# Patient Record
Sex: FEMALE | Race: WHITE | NOT HISPANIC OR LATINO | URBAN - METROPOLITAN AREA
[De-identification: names, ages, dates, MRNs, and addresses within clinical notes are randomized per-mention and may not be internally consistent; named-entity substitution may affect disease eponyms.]

---

## 2019-05-22 PROBLEM — Z86.010 PERSONAL HISTORY OF COLONIC POLYPS: Status: ACTIVE | Noted: 2019-05-23

## 2019-05-22 PROBLEM — Z86.010 SURVEILLANCE DUE TO PRIOR COLONIC NEOPLASIA: Status: ACTIVE | Noted: 2019-05-23

## 2019-05-23 ENCOUNTER — AMBULATORY SURGICAL CENTER (OUTPATIENT)
Dept: URBAN - METROPOLITAN AREA SURGERY 17 | Facility: SURGERY | Age: 74
End: 2019-05-23
Payer: MEDICARE

## 2019-05-23 VITALS
SYSTOLIC BLOOD PRESSURE: 179 MMHG | OXYGEN SATURATION: 92 % | DIASTOLIC BLOOD PRESSURE: 78 MMHG | RESPIRATION RATE: 22 BRPM | DIASTOLIC BLOOD PRESSURE: 52 MMHG | SYSTOLIC BLOOD PRESSURE: 162 MMHG | SYSTOLIC BLOOD PRESSURE: 111 MMHG | SYSTOLIC BLOOD PRESSURE: 143 MMHG | TEMPERATURE: 97.8 F | DIASTOLIC BLOOD PRESSURE: 68 MMHG | SYSTOLIC BLOOD PRESSURE: 146 MMHG | OXYGEN SATURATION: 94 % | DIASTOLIC BLOOD PRESSURE: 93 MMHG | HEART RATE: 63 BPM | SYSTOLIC BLOOD PRESSURE: 174 MMHG | SYSTOLIC BLOOD PRESSURE: 148 MMHG | SYSTOLIC BLOOD PRESSURE: 164 MMHG | WEIGHT: 178 LBS | DIASTOLIC BLOOD PRESSURE: 104 MMHG | DIASTOLIC BLOOD PRESSURE: 85 MMHG | DIASTOLIC BLOOD PRESSURE: 97 MMHG | RESPIRATION RATE: 15 BRPM | HEART RATE: 64 BPM | RESPIRATION RATE: 19 BRPM | RESPIRATION RATE: 21 BRPM | HEART RATE: 60 BPM | RESPIRATION RATE: 23 BRPM | TEMPERATURE: 98.3 F | OXYGEN SATURATION: 90 % | SYSTOLIC BLOOD PRESSURE: 155 MMHG | DIASTOLIC BLOOD PRESSURE: 75 MMHG | RESPIRATION RATE: 16 BRPM | SYSTOLIC BLOOD PRESSURE: 140 MMHG | OXYGEN SATURATION: 98 % | DIASTOLIC BLOOD PRESSURE: 74 MMHG | OXYGEN SATURATION: 93 % | HEART RATE: 45 BPM | HEART RATE: 57 BPM | OXYGEN SATURATION: 95 % | HEART RATE: 50 BPM | DIASTOLIC BLOOD PRESSURE: 70 MMHG | HEART RATE: 69 BPM | OXYGEN SATURATION: 97 % | DIASTOLIC BLOOD PRESSURE: 69 MMHG | SYSTOLIC BLOOD PRESSURE: 184 MMHG | HEIGHT: 64 IN | HEART RATE: 59 BPM | OXYGEN SATURATION: 96 % | HEART RATE: 55 BPM | RESPIRATION RATE: 30 BRPM | DIASTOLIC BLOOD PRESSURE: 60 MMHG | HEART RATE: 51 BPM | SYSTOLIC BLOOD PRESSURE: 161 MMHG

## 2019-05-23 DIAGNOSIS — K57.30 DIVERTICULOSIS OF LARGE INTESTINE WITHOUT PERFORATION OR ABS: ICD-10-CM

## 2019-05-23 DIAGNOSIS — Z86.010 PERSONAL HISTORY OF COLONIC POLYPS: ICD-10-CM

## 2019-05-23 PROCEDURE — G0105 COLORECTAL SCRN; HI RISK IND: HCPCS | Performed by: INTERNAL MEDICINE

## 2019-05-23 NOTE — SERVICEHPINOTES
74-year-old white female with a history of polyps and diverticular disease. She has no GI complaints at this time. Family history is negative. She presents for a follow-up colonoscopy.

## 2020-08-06 ENCOUNTER — OFFICE VISIT (OUTPATIENT)
Dept: CARDIOLOGY | Facility: CLINIC | Age: 75
End: 2020-08-06

## 2020-08-06 VITALS
OXYGEN SATURATION: 98 % | WEIGHT: 180 LBS | DIASTOLIC BLOOD PRESSURE: 80 MMHG | HEIGHT: 64 IN | HEART RATE: 54 BPM | BODY MASS INDEX: 30.73 KG/M2 | SYSTOLIC BLOOD PRESSURE: 122 MMHG

## 2020-08-06 DIAGNOSIS — I48.0 PAROXYSMAL ATRIAL FIBRILLATION (HCC): Primary | ICD-10-CM

## 2020-08-06 PROCEDURE — 93000 ELECTROCARDIOGRAM COMPLETE: CPT | Performed by: INTERNAL MEDICINE

## 2020-08-06 PROCEDURE — 99204 OFFICE O/P NEW MOD 45 MIN: CPT | Performed by: INTERNAL MEDICINE

## 2020-08-06 RX ORDER — DILTIAZEM HYDROCHLORIDE EXTENDED-RELEASE TABLETS 240 MG/1
240 TABLET, EXTENDED RELEASE ORAL NIGHTLY
COMMUNITY
End: 2023-02-10 | Stop reason: SDUPTHER

## 2020-08-06 RX ORDER — ASCORBIC ACID 250 MG
1 TABLET,CHEWABLE ORAL DAILY
COMMUNITY
End: 2022-05-06

## 2020-08-06 RX ORDER — CARVEDILOL 6.25 MG/1
6.25 TABLET ORAL 2 TIMES DAILY WITH MEALS
COMMUNITY
End: 2022-04-22 | Stop reason: HOSPADM

## 2020-08-06 RX ORDER — MULTIVITAMIN
1 TABLET ORAL DAILY
COMMUNITY

## 2020-09-13 NOTE — PROGRESS NOTES
Date of Office Visit: 2020  Encounter Provider: Larry White MD  Place of Service: [facility] CARDIOLOGY  Patient Name: Ely Dumont  :1945    Chief complaint: Paroxysmal atrial fibrillation    History of Present Illness:    I had the pleasure of seeing the patient in cardiology office on 2020.  She is a very pleasant 75 year-old female with a history of paroxysmal atrial fibrillation.  I actually used to follow the patient years ago, but I have not seen her since .    The patient has a history of brief paroxysmal atrial tachycardia remotely.  However, she was diagnosed with paroxysmal atrial fibrillation with rapid response on 7/15/2020.  She states that she tripped and hit her face on 2020.  On 7/15/2020, she was sitting down when her heart rate started racing severely.  She felt short of breath and significant palpitations at the time.  She was admitted to [facility] in [place], [place], on 7/15/2020.  She was placed on a Cardizem drip and was rate controlled over the next several days.  She was anticoagulated with Eliquis at discharge.  She did have an echocardiogram performed on 2020 which showed an ejection fraction of 55% and no significant valvular disease.    The patient presents today to reestablish care with me.  Again, she recently was diagnosed with atrial fibrillation.  She is actually in sinus rhythm today.  She has not felt any chest pain or shortness of breath.  She really has not had recent palpitations.  She is taking all of her medications without difficulty.  She has not had any bleeding with the Eliquis.    Past Medical History:   Diagnosis Date   • Atrial fibrillation with RVR (CMS/HCC)     recurrent   • Colon polyp    • Contusion of face    • Essential hypertension    • Fall    • Hyperlipidemia    • New onset atrial fibrillation (CMS/HCC) 2020   • Osteoarthritis    • Osteopenia    • Paroxysmal  atrial tachycardia (CMS/HCC)    • Renal insufficiency     due to nsaids (8/2017 states labs wnl)   • Snoring        Past Surgical History:   Procedure Laterality Date   • CARDIAC CATHETERIZATION Left 2011    [facility], Dr. Cooper Ramires   • COLONOSCOPY  2014    negative   • COLONOSCOPY  2011    polyp-repeat 2014   • CYSTOSCOPY  09/2016    negative- Dr. Murphy per patient urethral polyp noted   • SKIN CANCER EXCISION  04/2016    basal cell on back   • TUBAL ABDOMINAL LIGATION         Current Outpatient Medications on File Prior to Visit   Medication Sig Dispense Refill   • apixaban (ELIQUIS) 5 MG tablet tablet Take 5 mg by mouth.     • Ascorbic Acid (VITAMIN C) 250 MG chewable tablet Chew.     • Calcium Carbonate-Vitamin D 500-50 MG-UNIT capsule Take  by mouth.     • carvedilol (COREG) 6.25 MG tablet Take 6.25 mg by mouth.     • Cholecalciferol (Vitamin D-1000 Max St) 25 MCG (1000 UT) tablet Take 1,000 Units by mouth Daily.     • cyanocobalamin ([employer] Vitamin B-12) 1000 MCG tablet Take  by mouth.     • dilTIAZem LA (CARDIZEM LA) 240 MG 24 hr tablet Take 240 mg by mouth Daily.     • Multiple Vitamin (MULTIVITAMIN) tablet Take 1 tablet by mouth Daily.     • PROBIOTIC PRODUCT PO Take  by mouth.       No current facility-administered medications on file prior to visit.      Allergies as of 08/06/2020 - Reviewed 08/06/2020   Allergen Reaction Noted   • Penicillins Rash 07/23/2014     Social History     Socioeconomic History   • Marital status:      Spouse name: Not on file   • Number of children: Not on file   • Years of education: Not on file   • Highest education level: Not on file   Tobacco Use   • Smoking status: Never Smoker   • Smokeless tobacco: Never Used   Substance and Sexual Activity   • Alcohol use: Never     Frequency: Never   • Drug use: Never     Family History   Problem Relation Age of Onset   • Heart disease Mother         afib   • Lung disease Mother         interstitial lung  "disease   • Stroke Father         hemmorhagic stroke-aneurysm   • Diabetes Brother        Review of Systems   Musculoskeletal: Positive for joint pain.   Genitourinary: Positive for frequency.   All other systems reviewed and are negative.     Objective:     Vitals:    08/06/20 0943   BP: 122/80   Pulse: 54   SpO2: 98%   Weight: 81.6 kg (180 lb)   Height: 162.6 cm (64\")     Body mass index is 30.9 kg/m².    Constitutional:       Appearance: Healthy appearance. Well-developed.   Eyes:      General: Lids are normal.      Conjunctiva/sclera: Conjunctivae normal.   HENT:      Head: Normocephalic and atraumatic.   Neck:      Musculoskeletal: Neck supple.   Pulmonary:      Effort: Pulmonary effort is normal.      Breath sounds: Normal breath sounds.   Cardiovascular:      Normal rate. Regular rhythm.      No gallop. No click. No rub.   Edema:     Peripheral edema absent.   Abdominal:      Palpations: Abdomen is soft.      Tenderness: There is no abdominal tenderness.   Skin:     General: Skin is warm.   Neurological:      Mental Status: Alert and oriented to person, place, and time.   Psychiatric:         Behavior: Behavior normal.       Lab Review:     ECG 12 Lead    Date/Time: 8/6/2020 10:10 AM  Performed by: Larry White MD  Authorized by: Larry White MD   Comparison: compared with previous ECG from 7/17/2020  Comparison to previous ECG: Compared to the prior EKG, sinus rhythm has replaced atrial fibrillation  Rhythm: sinus bradycardia  Rate: bradycardic  BPM: 56  Other findings: T wave abnormality and left atrial abnormality    Clinical impression: abnormal EKG          Cardiac Procedures:  1.  Left heart catheterization on 5/17/2011: Normal coronary arteries.  Tortuous vessels.  2.  Echocardiogram on 7/17/2020: Ejection fraction 55%.  Right ventricular function normal.  No significant valvular disease.      Assessment:       Diagnosis Plan   1. Paroxysmal atrial fibrillation (CMS/HCC)       Plan:     "   Again, the patient has converted back to sinus rhythm at this point.  She is currently in sinus bradycardia with a rate of 56 on her EKG.  She is currently on carvedilol 6.25 mg twice a day, Cardizem  mg/day, and Eliquis 5 mg twice a day.  I am going to leave her on her current regimen.  Her CHADS-VASc score is 4, and I do agree with anticoagulation.  Her echocardiogram did not show any significant pathology.  For now, I am going to have her return to the office in 3 months unless other issues arise.

## 2020-10-20 ENCOUNTER — OFFICE VISIT (OUTPATIENT)
Dept: CARDIOLOGY | Facility: CLINIC | Age: 75
End: 2020-10-20

## 2020-10-20 VITALS
BODY MASS INDEX: 30.73 KG/M2 | OXYGEN SATURATION: 99 % | HEIGHT: 64 IN | WEIGHT: 180 LBS | SYSTOLIC BLOOD PRESSURE: 132 MMHG | HEART RATE: 61 BPM | DIASTOLIC BLOOD PRESSURE: 84 MMHG

## 2020-10-20 DIAGNOSIS — I48.0 PAROXYSMAL ATRIAL FIBRILLATION (HCC): Primary | ICD-10-CM

## 2020-10-20 PROCEDURE — 99213 OFFICE O/P EST LOW 20 MIN: CPT | Performed by: INTERNAL MEDICINE

## 2020-10-21 NOTE — PROGRESS NOTES
Date of Office Visit:  10/20/2020  Encounter Provider: Larry White MD  Place of Service: [facility] CARDIOLOGY  Patient Name: Ely Dumont  :1945    Chief complaint: Paroxysmal atrial fibrillation    History of Present Illness:    I again had the pleasure of seeing the patient in cardiology office on 10/20/2020.  She is a very pleasant 75 year-old female with a history of paroxysmal atrial fibrillation who presents for follow-up.    The patient has a history of brief paroxysmal atrial tachycardia remotely.  However, she was diagnosed with paroxysmal atrial fibrillation with rapid response on 7/15/2020.  She states that she tripped and hit her face on 2020.  On 7/15/2020, she was sitting down when her heart rate started racing severely.  She felt short of breath and significant palpitations at the time.  She was admitted to [facility] in [place], [place], on 7/15/2020.  She was placed on a Cardizem drip and was rate controlled over the next several days.  She was anticoagulated with Eliquis at discharge.  She did have an echocardiogram performed on 2020 which showed an ejection fraction of 55% and no significant valvular disease.    The patient presents today for follow-up.  Overall, she has been doing well.  She had 2 episodes of atrial fibrillation in 2020, but has not felt any recently.  She only occasionally has palpitations.  She has been taking the Eliquis without any difficulty.  She does get fatigued at times, and attributes this to some of her medications.    Past Medical History:   Diagnosis Date   • Atrial fibrillation with RVR (CMS/HCC)     recurrent   • Colon polyp    • Contusion of face    • Essential hypertension    • Fall    • Hyperlipidemia    • New onset atrial fibrillation (CMS/HCC) 2020   • Osteoarthritis    • Osteopenia    • Paroxysmal atrial tachycardia (CMS/HCC)    • Renal insufficiency     due to  nsaids (8/2017 states labs wnl)   • Snoring        Past Surgical History:   Procedure Laterality Date   • CARDIAC CATHETERIZATION Left 2011    [facility], Dr. Cooper Ramires   • COLONOSCOPY  2014    negative   • COLONOSCOPY  2011    polyp-repeat 2014   • CYSTOSCOPY  09/2016    negative- Dr. Murphy per patient urethral polyp noted   • SKIN CANCER EXCISION  04/2016    basal cell on back   • TUBAL ABDOMINAL LIGATION         Current Outpatient Medications on File Prior to Visit   Medication Sig Dispense Refill   • apixaban (ELIQUIS) 5 MG tablet tablet Take 5 mg by mouth.     • Ascorbic Acid (VITAMIN C) 250 MG chewable tablet Chew.     • Calcium Carbonate-Vitamin D 500-50 MG-UNIT capsule Take  by mouth.     • carvedilol (COREG) 6.25 MG tablet Take 6.25 mg by mouth.     • Cholecalciferol (Vitamin D-1000 Max St) 25 MCG (1000 UT) tablet Take 1,000 Units by mouth Daily.     • cyanocobalamin ([employer] Vitamin B-12) 1000 MCG tablet Take  by mouth.     • dilTIAZem LA (CARDIZEM LA) 240 MG 24 hr tablet Take 240 mg by mouth Daily.     • Multiple Vitamin (MULTIVITAMIN) tablet Take 1 tablet by mouth Daily.     • PROBIOTIC PRODUCT PO Take  by mouth.       No current facility-administered medications on file prior to visit.      Allergies as of 10/20/2020 - Reviewed 10/20/2020   Allergen Reaction Noted   • Penicillins Rash 07/23/2014     Social History     Socioeconomic History   • Marital status:      Spouse name: Not on file   • Number of children: Not on file   • Years of education: Not on file   • Highest education level: Not on file   Tobacco Use   • Smoking status: Never Smoker   • Smokeless tobacco: Never Used   Substance and Sexual Activity   • Alcohol use: Never     Frequency: Never   • Drug use: Never     Family History   Problem Relation Age of Onset   • Heart disease Mother         afib   • Lung disease Mother         interstitial lung disease   • Stroke Father         hemmorhagic stroke-aneurysm   •  "Diabetes Brother        Review of Systems   Constitution: Positive for malaise/fatigue.   Musculoskeletal: Positive for joint pain.   Genitourinary: Positive for frequency.   All other systems reviewed and are negative.     Objective:     Vitals:    10/20/20 1345   BP: 132/84   Pulse: 61   SpO2: 99%   Weight: 81.6 kg (180 lb)   Height: 162.6 cm (64.02\")     Body mass index is 30.88 kg/m².    Constitutional:       Appearance: Healthy appearance. Well-developed.   Eyes:      General: Lids are normal.      Conjunctiva/sclera: Conjunctivae normal.   HENT:      Head: Normocephalic and atraumatic.   Neck:      Musculoskeletal: Neck supple.   Pulmonary:      Effort: Pulmonary effort is normal.      Breath sounds: Normal breath sounds.   Cardiovascular:      Normal rate. Regular rhythm.      Murmurs: There is no murmur.      No gallop. No click. No rub.   Edema:     Peripheral edema absent.   Abdominal:      Palpations: Abdomen is soft.      Tenderness: There is no abdominal tenderness.   Skin:     General: Skin is warm.   Neurological:      Mental Status: Alert and oriented to person, place, and time.   Psychiatric:         Behavior: Behavior normal.       Lab Review:   Procedures  Cardiac Procedures:  1.  Left heart catheterization on 5/17/2011: Normal coronary arteries.  Tortuous vessels.  2.  Echocardiogram on 7/17/2020: Ejection fraction 55%.  Right ventricular function normal.  No significant valvular disease.      Assessment:       Diagnosis Plan   1. Paroxysmal atrial fibrillation (CMS/HCC)       Plan:       The patient seems to be doing very well.  The fatigue is very likely from the carvedilol and the Cardizem CD.  However, she is tolerating both of these very well.  She is going to move the Cardizem CD from around noon to the evening to see if this helps.  Overall, she has not had any bleeding with the Eliquis thus far, and will continue on this.  I will see her back in the office in 6 months.      "

## 2021-03-02 ENCOUNTER — HOSPITAL ENCOUNTER (OUTPATIENT)
Dept: VACCINE CLINIC | Facility: HOSPITAL | Age: 76
Discharge: HOME | End: 2021-03-02
Attending: INTERNAL MEDICINE

## 2021-03-24 ENCOUNTER — HOSPITAL ENCOUNTER (OUTPATIENT)
Dept: VACCINE CLINIC | Facility: HOSPITAL | Age: 76
Discharge: HOME | End: 2021-03-24
Attending: INTERNAL MEDICINE

## 2021-05-04 ENCOUNTER — OFFICE VISIT (OUTPATIENT)
Dept: CARDIOLOGY | Facility: CLINIC | Age: 76
End: 2021-05-04

## 2021-05-04 VITALS
DIASTOLIC BLOOD PRESSURE: 100 MMHG | SYSTOLIC BLOOD PRESSURE: 160 MMHG | BODY MASS INDEX: 31.38 KG/M2 | HEART RATE: 80 BPM | HEIGHT: 64 IN | WEIGHT: 183.8 LBS

## 2021-05-04 DIAGNOSIS — I48.0 PAF (PAROXYSMAL ATRIAL FIBRILLATION) (HCC): Primary | ICD-10-CM

## 2021-05-04 DIAGNOSIS — I10 ESSENTIAL HYPERTENSION: ICD-10-CM

## 2021-05-04 PROCEDURE — 99213 OFFICE O/P EST LOW 20 MIN: CPT | Performed by: INTERNAL MEDICINE

## 2021-05-04 RX ORDER — LOSARTAN POTASSIUM 25 MG/1
25 TABLET ORAL DAILY
Qty: 30 TABLET | Refills: 11 | Status: SHIPPED | OUTPATIENT
Start: 2021-05-04 | End: 2022-05-19

## 2021-05-12 ENCOUNTER — LAB (OUTPATIENT)
Dept: LAB | Facility: HOSPITAL | Age: 76
End: 2021-05-12

## 2021-05-12 DIAGNOSIS — I10 ESSENTIAL HYPERTENSION: ICD-10-CM

## 2021-05-12 DIAGNOSIS — I48.0 PAF (PAROXYSMAL ATRIAL FIBRILLATION) (HCC): ICD-10-CM

## 2021-05-12 LAB
ANION GAP SERPL CALCULATED.3IONS-SCNC: 8.6 MMOL/L (ref 5–15)
BUN SERPL-MCNC: 15 MG/DL (ref 8–23)
BUN/CREAT SERPL: 21.7 (ref 7–25)
CALCIUM SPEC-SCNC: 9.1 MG/DL (ref 8.6–10.5)
CHLORIDE SERPL-SCNC: 104 MMOL/L (ref 98–107)
CO2 SERPL-SCNC: 26.4 MMOL/L (ref 22–29)
CREAT SERPL-MCNC: 0.69 MG/DL (ref 0.57–1)
GFR SERPL CREATININE-BSD FRML MDRD: 82 ML/MIN/1.73
GLUCOSE SERPL-MCNC: 106 MG/DL (ref 65–99)
POTASSIUM SERPL-SCNC: 4.2 MMOL/L (ref 3.5–5.2)
SODIUM SERPL-SCNC: 139 MMOL/L (ref 136–145)

## 2021-05-12 PROCEDURE — 36415 COLL VENOUS BLD VENIPUNCTURE: CPT

## 2021-05-12 PROCEDURE — 80048 BASIC METABOLIC PNL TOTAL CA: CPT

## 2021-05-16 NOTE — PROGRESS NOTES
"Date of Office Visit:  2021  Encounter Provider: Larry White MD  Place of Service: [facility] CARDIOLOGY  Patient Name: Ely Dumont  :1945    Chief complaint: Paroxysmal atrial fibrillation    History of Present Illness:    I again had the pleasure of seeing the patient in cardiology office on 2021.  She is a very pleasant 76 year-old female with a history of paroxysmal atrial fibrillation who presents for follow-up.    The patient has a history of brief paroxysmal atrial tachycardia remotely.  However, she was diagnosed with paroxysmal atrial fibrillation with rapid response on 7/15/2020.  She states that she tripped and hit her face on 2020.  On 7/15/2020, she was sitting down when her heart rate started racing severely.  She felt short of breath and significant palpitations at the time.  She was admitted to [facility] in [place], [place], on 7/15/2020.  She was placed on a Cardizem drip and was rate controlled over the next several days.  She was anticoagulated with Eliquis at discharge.  She did have an echocardiogram performed on 2020 which showed an ejection fraction of 55% and no significant valvular disease.    The patient presents today for follow-up.  She has not felt any recent atrial fibrillation.  Her blood pressure is elevated today at 160/100.  She tells me that her systolic blood pressure normally runs in the 140s at home, although she recently had a reading of 154/92.  She occasionally feels like she \"drops beats\", but has not had rapid palpitations consistent with the atrial fibrillation.  She has had no chest pain.    Past Medical History:   Diagnosis Date   • Atrial fibrillation with RVR (CMS/HCC)     recurrent   • Colon polyp    • Contusion of face    • Essential hypertension    • Fall    • Hyperlipidemia    • New onset atrial fibrillation (CMS/HCC) 2020   • Osteoarthritis    • Osteopenia    • " Paroxysmal atrial tachycardia (CMS/HCC)    • Renal insufficiency     due to nsaids (8/2017 states labs wnl)   • Snoring        Past Surgical History:   Procedure Laterality Date   • CARDIAC CATHETERIZATION Left 2011    [facility], Dr. Cooper Ramires   • COLONOSCOPY  2014    negative   • COLONOSCOPY  2011    polyp-repeat 2014   • CYSTOSCOPY  09/2016    negative- Dr. Murphy per patient urethral polyp noted   • SKIN CANCER EXCISION  04/2016    basal cell on back   • TUBAL ABDOMINAL LIGATION         Current Outpatient Medications on File Prior to Visit   Medication Sig Dispense Refill   • apixaban (ELIQUIS) 5 MG tablet tablet Take 5 mg by mouth.     • Ascorbic Acid (VITAMIN C) 250 MG chewable tablet Chew.     • Calcium Carbonate-Vitamin D 500-50 MG-UNIT capsule Take  by mouth.     • carvedilol (COREG) 6.25 MG tablet Take 6.25 mg by mouth.     • Cholecalciferol (Vitamin D-1000 Max St) 25 MCG (1000 UT) tablet Take 1,000 Units by mouth Daily.     • cyanocobalamin ([employer] Vitamin B-12) 1000 MCG tablet Take  by mouth.     • dilTIAZem LA (CARDIZEM LA) 240 MG 24 hr tablet Take 240 mg by mouth Daily.     • Multiple Vitamin (MULTIVITAMIN) tablet Take 1 tablet by mouth Daily.     • PROBIOTIC PRODUCT PO Take  by mouth.       No current facility-administered medications on file prior to visit.     Allergies as of 05/04/2021 - Reviewed 05/04/2021   Allergen Reaction Noted   • Penicillins Rash 07/23/2014     Social History     Socioeconomic History   • Marital status:      Spouse name: Not on file   • Number of children: Not on file   • Years of education: Not on file   • Highest education level: Not on file   Tobacco Use   • Smoking status: Never Smoker   • Smokeless tobacco: Never Used   Substance and Sexual Activity   • Alcohol use: Never   • Drug use: Never     Family History   Problem Relation Age of Onset   • Heart disease Mother         afib   • Lung disease Mother         interstitial lung disease   •  "Stroke Father         hemmorhagic stroke-aneurysm   • Diabetes Brother        Review of Systems   Constitutional: Positive for malaise/fatigue.   Musculoskeletal: Positive for joint pain.   All other systems reviewed and are negative.     Objective:     Vitals:    05/04/21 1344   BP: 160/100   BP Location: Left arm   Patient Position: Sitting   Pulse: 80   Weight: 83.4 kg (183 lb 12.8 oz)   Height: 162.6 cm (64\")     Body mass index is 31.55 kg/m².    Constitutional:       Appearance: Healthy appearance. Well-developed.   Eyes:      General: Lids are normal.      Conjunctiva/sclera: Conjunctivae normal.   HENT:      Head: Normocephalic and atraumatic.   Pulmonary:      Effort: Pulmonary effort is normal.      Breath sounds: Normal breath sounds.   Cardiovascular:      Normal rate. Regular rhythm.      Murmurs: There is no murmur.      No gallop. No click. No rub.   Edema:     Peripheral edema absent.   Abdominal:      Palpations: Abdomen is soft.      Tenderness: There is no abdominal tenderness.   Musculoskeletal:      Cervical back: Neck supple. Skin:     General: Skin is warm.   Neurological:      Mental Status: Alert and oriented to person, place, and time.   Psychiatric:         Behavior: Behavior normal.       Lab Review:   Procedures  Cardiac Procedures:  1.  Left heart catheterization on 5/17/2011: Normal coronary arteries.  Tortuous vessels.  2.  Echocardiogram on 7/17/2020: Ejection fraction 55%.  Right ventricular function normal.  No significant valvular disease.      Assessment:       Diagnosis Plan   1. PAF (paroxysmal atrial fibrillation) (CMS/HCC)  Basic Metabolic Panel   2. Essential hypertension  Basic Metabolic Panel     Plan:       Again, the carvedilol and Cardizem CD seem to be controlling the atrial fibrillation very well.  She has had very few episodes since starting on these medications.  She is taking the Eliquis without difficulty.  Her blood pressure is elevated, as described above.  Her " systolic blood pressure is mainly in the 140s at home, although she had a recent reading of 154/92.  I really do not want to increase the carvedilol or the Cardizem CD further as she already has fatigue likely from these agents.  I am going to add losartan 25 mg/day, which can be titrated upwards if needed.  I will check a basic metabolic panel in 2 weeks to ensure that she is tolerating this.  Otherwise, I will plan on seeing her back in the office in 6 months.

## 2021-11-08 ENCOUNTER — TELEPHONE (OUTPATIENT)
Dept: ORTHOPEDIC SURGERY | Facility: CLINIC | Age: 76
End: 2021-11-08

## 2021-11-12 ENCOUNTER — OFFICE VISIT (OUTPATIENT)
Dept: ORTHOPEDIC SURGERY | Facility: CLINIC | Age: 76
End: 2021-11-12

## 2021-11-12 VITALS — HEIGHT: 64 IN | OXYGEN SATURATION: 97 % | HEART RATE: 74 BPM | WEIGHT: 186.4 LBS | BODY MASS INDEX: 31.82 KG/M2

## 2021-11-12 DIAGNOSIS — M25.562 PAIN IN BOTH KNEES, UNSPECIFIED CHRONICITY: Primary | ICD-10-CM

## 2021-11-12 DIAGNOSIS — M25.561 PAIN IN BOTH KNEES, UNSPECIFIED CHRONICITY: Primary | ICD-10-CM

## 2021-11-12 DIAGNOSIS — M17.0 BILATERAL PRIMARY OSTEOARTHRITIS OF KNEE: ICD-10-CM

## 2021-11-12 PROCEDURE — 99204 OFFICE O/P NEW MOD 45 MIN: CPT | Performed by: ORTHOPAEDIC SURGERY

## 2021-11-12 NOTE — PROGRESS NOTES
"Chief Complaint  Pain of the Left Knee and Pain of the Right Knee     Subjective      Ely Dumont presents to [facility] for evaluation of the bilateral knees. The patient reports her right is worse than left. She has had knee pain for several years. She has previously had injections last year in [place]. She has no injury or trauma. She reports pain with stairs and instability of her right knee. She has no other complaints.     Allergies   Allergen Reactions   • Penicillins Rash     Mild rash        Social History     Socioeconomic History   • Marital status:    Tobacco Use   • Smoking status: Never Smoker   • Smokeless tobacco: Never Used   Substance and Sexual Activity   • Alcohol use: Never   • Drug use: Never        Review of Systems     Objective   Vital Signs:   Pulse 74   Ht 162.6 cm (64\")   Wt 84.6 kg (186 lb 6.4 oz)   SpO2 97%   BMI 32.00 kg/m²       Physical Exam  Constitutional:       Appearance: Normal appearance. The patient is well-developed and normal weight.   HENT:      Head: Normocephalic.      Right Ear: Hearing and external ear normal.      Left Ear: Hearing and external ear normal.      Nose: Nose normal.   Eyes:      Conjunctiva/sclera: Conjunctivae normal.   Cardiovascular:      Rate and Rhythm: Normal rate.   Pulmonary:      Effort: Pulmonary effort is normal.      Breath sounds: No wheezing or rales.   Abdominal:      Palpations: Abdomen is soft.      Tenderness: There is no abdominal tenderness.   Musculoskeletal:      Cervical back: Normal range of motion.   Skin:     Findings: No rash.   Neurological:      Mental Status: The patient is alert and oriented to person, place, and time.   Psychiatric:         Mood and Affect: Mood and affect normal.         Judgment: Judgment normal.       Ortho Exam      Right knee- crepitus to right knee. Tender to the lateral knee. Mild valgus deformity. ROM 0-120 degrees. Superficial veins to leg. " Grossly stable. Neurovascularly intact.     Left knee- positive crepitus. ROM 0-130 degrees. Stable to varus/valgus stress. Stable to anterior/posterior drawer. Neurovascularly intact. Non-tender      Procedures    X-Ray Report:  Bilateral knee(s) X-Ray  Indication: Evaluation of bilateral knee pain  AP, Lateral, Standing and Sunrise view(s)  Findings: moderate advanced degenerative changes of the right knee with joint space narrowing to lateral and patella compartments. No effusion, moderate degenerative changes of the left knee with mild joint space narrowing. No acute fracture.   Prior studies available for comparison: no         Imaging Results (Most Recent)     Procedure Component Value Units Date/Time    XR Knee 3 View Bilateral [[id number]] Resulted: 11/12/21 0959     Updated: 11/12/21 1005           Result Review :       No results found.           Assessment and Plan     DX: bilateral knee osteoarthritis     Discussed the treatment options with the patient, operative vs non-operative. Discussed the risks and benefits of a Right Total Knee Arthroplasty. The patient expressed understanding and wished to proceed in the future. Discussed the risks and benefits of bilateral knee injections. The patient expressed understanding but had a COVID booster Monday. Will follow up for injections.     Discussed surgery., Risks/benefits discussed with patient including, but not limited to: infection, bleeding, neurovascular damage, malunion, nonunion, aesthetic deformity, need for further surgery, and death., Discussed with patient the implant type being used during surgery and patient understands and desires to proceed., Surgery pamphlet given. and Call or return if worsening symptoms.    Follow Up     2 weeks for injections.       Patient was given instructions and counseling regarding her condition or for health maintenance advice. Please see specific information pulled into the AVS if appropriate.     Scribed for Geraldo CRUZ  MD Azeem by Guerline Oh.  11/12/21   10:11 EST    I have personally performed the services described in this document as scribed by the above individual and it is both accurate and complete. Geraldo Gann MD 11/14/21

## 2021-12-02 ENCOUNTER — OFFICE VISIT (OUTPATIENT)
Dept: ORTHOPEDIC SURGERY | Facility: CLINIC | Age: 76
End: 2021-12-02

## 2021-12-02 VITALS — OXYGEN SATURATION: 98 % | HEART RATE: 68 BPM | WEIGHT: 187 LBS | HEIGHT: 64 IN | BODY MASS INDEX: 31.92 KG/M2

## 2021-12-02 DIAGNOSIS — M17.0 BILATERAL PRIMARY OSTEOARTHRITIS OF KNEE: Primary | ICD-10-CM

## 2021-12-02 PROCEDURE — 20610 DRAIN/INJ JOINT/BURSA W/O US: CPT | Performed by: ORTHOPAEDIC SURGERY

## 2021-12-02 RX ORDER — FUROSEMIDE 20 MG/1
20 TABLET ORAL DAILY
COMMUNITY
Start: 2021-06-03 | End: 2022-06-03

## 2021-12-02 RX ORDER — CARVEDILOL 6.25 MG/1
1 TABLET ORAL 2 TIMES DAILY WITH MEALS
Status: ON HOLD | COMMUNITY
Start: 2021-09-07 | End: 2022-04-22 | Stop reason: SDUPTHER

## 2021-12-02 RX ADMIN — LIDOCAINE HYDROCHLORIDE 9 ML: 10 INJECTION, SOLUTION INFILTRATION; PERINEURAL at 11:59

## 2021-12-02 RX ADMIN — TRIAMCINOLONE ACETONIDE 40 MG: 40 INJECTION, SUSPENSION INTRA-ARTICULAR; INTRAMUSCULAR at 11:59

## 2021-12-02 NOTE — PROGRESS NOTES
"Chief Complaint  Pain of the Left Knee and Pain of the Right Knee     Subjective      Ely Dumont presents to [facility] for follow up evaluation of the bilateral knees. The patient has been treating her bilateral knee osteo conservatively. She wished to have injections at her last visit but couldn't due to having her COVID boosters. She is here today for bilateral knee injections.     Allergies   Allergen Reactions   • Penicillins Rash     Mild rash        Social History     Socioeconomic History   • Marital status:    Tobacco Use   • Smoking status: Never Smoker   • Smokeless tobacco: Never Used   Substance and Sexual Activity   • Alcohol use: Never   • Drug use: Never        Review of Systems     Objective   Vital Signs:   Pulse 68   Ht 162.6 cm (64\")   Wt 84.8 kg (187 lb)   SpO2 98%   BMI 32.10 kg/m²       Physical Exam  Constitutional:       Appearance: Normal appearance. The patient is well-developed and normal weight.   HENT:      Head: Normocephalic.      Right Ear: Hearing and external ear normal.      Left Ear: Hearing and external ear normal.      Nose: Nose normal.   Eyes:      Conjunctiva/sclera: Conjunctivae normal.   Cardiovascular:      Rate and Rhythm: Normal rate.   Pulmonary:      Effort: Pulmonary effort is normal.      Breath sounds: No wheezing or rales.   Abdominal:      Palpations: Abdomen is soft.      Tenderness: There is no abdominal tenderness.   Musculoskeletal:      Cervical back: Normal range of motion.   Skin:     Findings: No rash.   Neurological:      Mental Status: The patient is alert and oriented to person, place, and time.   Psychiatric:         Mood and Affect: Mood and affect normal.         Judgment: Judgment normal.       Ortho Exam      Right knee- crepitus to right knee. Tender to the lateral knee. Mild valgus deformity. ROM 0-120 degrees. Superficial veins to leg. Grossly stable. Neurovascularly intact.      Left knee- " positive crepitus. ROM 0-130 degrees. Stable to varus/valgus stress. Stable to anterior/posterior drawer. Neurovascularly intact. Non-tender    Left knee : L knee  Date/Time: 12/2/2021 11:59 AM  Consent given by: patient  Site marked: site marked  Timeout: Immediately prior to procedure a time out was called to verify the correct patient, procedure, equipment, support staff and site/side marked as required   Supporting Documentation  Indications: pain   Procedure Details  Location: knee - L knee  Needle gauge: 21G.  Medications administered: 9 mL lidocaine 1 %; 40 mg triamcinolone acetonide 40 MG/ML  Patient tolerance: patient tolerated the procedure well with no immediate complications    Right knee : R knee  Date/Time: 12/2/2021 11:59 AM  Consent given by: patient  Site marked: site marked  Timeout: Immediately prior to procedure a time out was called to verify the correct patient, procedure, equipment, support staff and site/side marked as required   Supporting Documentation  Indications: pain   Procedure Details  Location: knee - R knee  Needle gauge: 21G.  Medications administered: 9 mL lidocaine 1 %; 40 mg triamcinolone acetonide 40 MG/ML  Patient tolerance: patient tolerated the procedure well with no immediate complications          Imaging Results (Most Recent)     None           Result Review :       XR Knee 3 View Bilateral    Result Date: 11/16/2021  Narrative: X-Ray Report: Bilateral knee(s) X-Ray Indication: Evaluation of bilateral knee pain AP, Lateral, Standing and Sunrise view(s) Findings: moderate advanced degenerative changes of the right knee with joint space narrowing to lateral and patella compartments. No effusion, moderate degenerative changes of the left knee with mild joint space narrowing. No acute fracture. Prior studies available for comparison: no               Assessment and Plan     DX: bilateral knee osteoarthritis     Discussed the risks and benefits of bilateral steroid injections.  The patient expressed understanding and wished to proceed. She tolerated the injections well.     Call or return if worsening symptoms.    Follow Up     3 months.       Patient was given instructions and counseling regarding her condition or for health maintenance advice. Please see specific information pulled into the AVS if appropriate.     Scribed for Geraldo Gann MD by Guerline Oh.  12/02/21   10:36 EST    I have personally performed the services described in this document as scribed by the above individual and it is both accurate and complete. Geraldo Gann MD 12/05/21

## 2021-12-05 RX ORDER — LIDOCAINE HYDROCHLORIDE 10 MG/ML
9 INJECTION, SOLUTION INFILTRATION; PERINEURAL
Status: COMPLETED | OUTPATIENT
Start: 2021-12-02 | End: 2021-12-02

## 2021-12-05 RX ORDER — TRIAMCINOLONE ACETONIDE 40 MG/ML
40 INJECTION, SUSPENSION INTRA-ARTICULAR; INTRAMUSCULAR
Status: COMPLETED | OUTPATIENT
Start: 2021-12-02 | End: 2021-12-02

## 2022-03-10 ENCOUNTER — OFFICE VISIT (OUTPATIENT)
Dept: ORTHOPEDIC SURGERY | Facility: CLINIC | Age: 77
End: 2022-03-10

## 2022-03-10 VITALS — OXYGEN SATURATION: 97 % | HEART RATE: 59 BPM | HEIGHT: 64 IN | BODY MASS INDEX: 28.17 KG/M2 | WEIGHT: 165 LBS

## 2022-03-10 DIAGNOSIS — M17.0 BILATERAL PRIMARY OSTEOARTHRITIS OF KNEE: Primary | ICD-10-CM

## 2022-03-10 DIAGNOSIS — M25.562 PAIN IN BOTH KNEES, UNSPECIFIED CHRONICITY: ICD-10-CM

## 2022-03-10 DIAGNOSIS — M25.561 PAIN IN BOTH KNEES, UNSPECIFIED CHRONICITY: ICD-10-CM

## 2022-03-10 PROCEDURE — 20610 DRAIN/INJ JOINT/BURSA W/O US: CPT | Performed by: PHYSICIAN ASSISTANT

## 2022-03-10 RX ORDER — BUPIVACAINE HYDROCHLORIDE 2.5 MG/ML
5 INJECTION, SOLUTION INFILTRATION; PERINEURAL
Status: COMPLETED | OUTPATIENT
Start: 2022-03-10 | End: 2022-03-10

## 2022-03-10 RX ORDER — TRIAMCINOLONE ACETONIDE 40 MG/ML
40 INJECTION, SUSPENSION INTRA-ARTICULAR; INTRAMUSCULAR
Status: COMPLETED | OUTPATIENT
Start: 2022-03-10 | End: 2022-03-10

## 2022-03-10 RX ADMIN — TRIAMCINOLONE ACETONIDE 40 MG: 40 INJECTION, SUSPENSION INTRA-ARTICULAR; INTRAMUSCULAR at 08:17

## 2022-03-10 RX ADMIN — BUPIVACAINE HYDROCHLORIDE 5 ML: 2.5 INJECTION, SOLUTION INFILTRATION; PERINEURAL at 08:17

## 2022-03-10 NOTE — PROGRESS NOTES
"Chief Complaint  Pain of the Left Knee and Pain of the Right Knee    Subjective          Ely Dumont is a 77 y.o. female  presents to [facility] for   History of Present Illness    Patient presents for follow-up evaluation of bilateral knee pain, bilateral knee osteoarthritis.  Patient has been treating her knees with injections in the past.  Her last injections were in December she states the injections for her bilateral knees have helped until over the last few weeks.  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  Patient states the pain is similar to past episodes she admits to popping in the right knee.  Denies locking catching or buckling of bilateral knees.  Allergies   Allergen Reactions   • Penicillins Rash     Mild rash        Social History     Socioeconomic History   • Marital status:    Tobacco Use   • Smoking status: Never Smoker   • Smokeless tobacco: Never Used   Substance and Sexual Activity   • Alcohol use: Never   • Drug use: Never        REVIEW OF SYSTEMS    Constitutional: Denies fevers, chills, weight loss  Cardiovascular: Denies chest pain, shortness of breath  Skin: Denies rashes, acute skin changes  Neurologic: Denies headache, loss of consciousness  MSK: Bilateral knee pain      Objective   Vital Signs:   Pulse 59   Ht 162.6 cm (64\")   Wt 74.8 kg (165 lb)   SpO2 97%   BMI 28.32 kg/m²     Body mass index is 28.32 kg/m².    Physical Exam    Right knee: Crepitus to the right knee with range of motion, tender to palpation of lateral and medial anterior knee.  Mild valgus deformity, full extension, flexion 120, superficial veins to the leg, stable anterior/posterior drawer, stable to varus/valgus stress, neurovascular intact.    Left knee: Crepitus with range of motion, full extension, flexion 120, stable to varus/valgus " stress, stable anterior/posterior drawer, nontender to palpation.    Large Joint Arthrocentesis: R knee  Date/Time: 3/10/2022 8:17 AM  Consent given by: patient  Site marked: site marked  Timeout: Immediately prior to procedure a time out was called to verify the correct patient, procedure, equipment, support staff and site/side marked as required   Supporting Documentation  Indications: pain   Procedure Details  Location: knee - R knee  Needle gauge: 21g.  Medications administered: 5 mL bupivacaine 0.25 %; 40 mg triamcinolone acetonide 40 MG/ML  Patient tolerance: patient tolerated the procedure well with no immediate complications    Large Joint Arthrocentesis: L knee  Date/Time: 3/10/2022 8:17 AM  Consent given by: patient  Site marked: site marked  Timeout: Immediately prior to procedure a time out was called to verify the correct patient, procedure, equipment, support staff and site/side marked as required   Supporting Documentation  Indications: pain   Procedure Details  Location: knee - L knee  Needle gauge: 21g.  Medications administered: 5 mL bupivacaine 0.25 %; 40 mg triamcinolone acetonide 40 MG/ML  Patient tolerance: patient tolerated the procedure well with no immediate complications          Imaging Results (Most Recent)     None           Result Review :   The following data was reviewed by: BROOKE Bryant on 03/10/2022:               Assessment and Plan    Diagnoses and all orders for this visit:    1. Bilateral primary osteoarthritis of knee (Primary)    2. Pain in both knees, unspecified chronicity        Discussed diagnosis and treatment options with the patient patient was advised to receive bilateral knee steroid injections which she requested and tolerated well.  If any new or concerning symptoms occur follow-up sooner otherwise follow-up in 3 months for reevaluation.  Patient agreed.    Call or return if worsening symptoms.    Follow Up   Return in about 3 months (around  6/10/2022) for Recheck.  Patient was given instructions and counseling regarding her condition or for health maintenance advice. Please see specific information pulled into the AVS if appropriate.

## 2022-04-19 ENCOUNTER — APPOINTMENT (OUTPATIENT)
Dept: CT IMAGING | Facility: HOSPITAL | Age: 77
End: 2022-04-19

## 2022-04-19 ENCOUNTER — APPOINTMENT (OUTPATIENT)
Dept: MRI IMAGING | Facility: HOSPITAL | Age: 77
End: 2022-04-19

## 2022-04-19 ENCOUNTER — HOSPITAL ENCOUNTER (INPATIENT)
Facility: HOSPITAL | Age: 77
LOS: 3 days | Discharge: HOME | End: 2022-04-22
Attending: EMERGENCY MEDICINE | Admitting: INTERNAL MEDICINE

## 2022-04-19 DIAGNOSIS — K85.10 GALLSTONE PANCREATITIS: ICD-10-CM

## 2022-04-19 DIAGNOSIS — K85.90 ACUTE PANCREATITIS, UNSPECIFIED COMPLICATION STATUS, UNSPECIFIED PANCREATITIS TYPE: Primary | ICD-10-CM

## 2022-04-19 DIAGNOSIS — Z98.890 S/P ERCP: ICD-10-CM

## 2022-04-19 LAB
ALBUMIN SERPL-MCNC: 4.1 G/DL (ref 3.5–5.2)
ALBUMIN/GLOB SERPL: 1.7 G/DL
ALP SERPL-CCNC: 90 U/L (ref 39–117)
ALT SERPL W P-5'-P-CCNC: 73 U/L (ref 1–33)
ANION GAP SERPL CALCULATED.3IONS-SCNC: 13.4 MMOL/L (ref 5–15)
AST SERPL-CCNC: 66 U/L (ref 1–32)
BACTERIA UR QL AUTO: ABNORMAL /HPF
BASOPHILS # BLD AUTO: 0.02 10*3/MM3 (ref 0–0.2)
BASOPHILS NFR BLD AUTO: 0.2 % (ref 0–1.5)
BILIRUB SERPL-MCNC: 1.1 MG/DL (ref 0–1.2)
BILIRUB UR QL STRIP: ABNORMAL
BUN SERPL-MCNC: 12 MG/DL (ref 8–23)
BUN/CREAT SERPL: 16 (ref 7–25)
CALCIUM SPEC-SCNC: 9 MG/DL (ref 8.6–10.5)
CHLORIDE SERPL-SCNC: 100 MMOL/L (ref 98–107)
CLARITY UR: CLEAR
CO2 SERPL-SCNC: 29.6 MMOL/L (ref 22–29)
COD CRY URNS QL: ABNORMAL /HPF
COLOR UR: ABNORMAL
CREAT SERPL-MCNC: 0.75 MG/DL (ref 0.57–1)
DEPRECATED RDW RBC AUTO: 46.9 FL (ref 37–54)
EGFRCR SERPLBLD CKD-EPI 2021: 81.1 ML/MIN/1.73
EOSINOPHIL # BLD AUTO: 0.09 10*3/MM3 (ref 0–0.4)
EOSINOPHIL NFR BLD AUTO: 1 % (ref 0.3–6.2)
ERYTHROCYTE [DISTWIDTH] IN BLOOD BY AUTOMATED COUNT: 13.2 % (ref 12.3–15.4)
GLOBULIN UR ELPH-MCNC: 2.4 GM/DL
GLUCOSE SERPL-MCNC: 126 MG/DL (ref 65–99)
GLUCOSE UR STRIP-MCNC: NEGATIVE MG/DL
HCT VFR BLD AUTO: 34.9 % (ref 34–46.6)
HGB BLD-MCNC: 11.8 G/DL (ref 12–15.9)
HGB UR QL STRIP.AUTO: NEGATIVE
HOLD SPECIMEN: NORMAL
HOLD SPECIMEN: NORMAL
HYALINE CASTS UR QL AUTO: ABNORMAL /LPF
IMM GRANULOCYTES # BLD AUTO: 0.07 10*3/MM3 (ref 0–0.05)
IMM GRANULOCYTES NFR BLD AUTO: 0.8 % (ref 0–0.5)
KETONES UR QL STRIP: ABNORMAL
LEUKOCYTE ESTERASE UR QL STRIP.AUTO: ABNORMAL
LIPASE SERPL-CCNC: >3000 U/L (ref 13–60)
LYMPHOCYTES # BLD AUTO: 1.13 10*3/MM3 (ref 0.7–3.1)
LYMPHOCYTES NFR BLD AUTO: 12.1 % (ref 19.6–45.3)
MAGNESIUM SERPL-MCNC: 1.9 MG/DL (ref 1.6–2.4)
MCH RBC QN AUTO: 33 PG (ref 26.6–33)
MCHC RBC AUTO-ENTMCNC: 33.8 G/DL (ref 31.5–35.7)
MCV RBC AUTO: 97.5 FL (ref 79–97)
MONOCYTES # BLD AUTO: 0.53 10*3/MM3 (ref 0.1–0.9)
MONOCYTES NFR BLD AUTO: 5.7 % (ref 5–12)
NEUTROPHILS NFR BLD AUTO: 7.48 10*3/MM3 (ref 1.7–7)
NEUTROPHILS NFR BLD AUTO: 80.2 % (ref 42.7–76)
NITRITE UR QL STRIP: NEGATIVE
NRBC BLD AUTO-RTO: 0 /100 WBC (ref 0–0.2)
PH UR STRIP.AUTO: 5.5 [PH] (ref 5–8)
PLATELET # BLD AUTO: 294 10*3/MM3 (ref 140–450)
PMV BLD AUTO: 9.8 FL (ref 6–12)
POTASSIUM SERPL-SCNC: 3 MMOL/L (ref 3.5–5.2)
PROT SERPL-MCNC: 6.5 G/DL (ref 6–8.5)
PROT UR QL STRIP: ABNORMAL
RBC # BLD AUTO: 3.58 10*6/MM3 (ref 3.77–5.28)
RBC # UR STRIP: ABNORMAL /HPF
REF LAB TEST METHOD: ABNORMAL
SODIUM SERPL-SCNC: 143 MMOL/L (ref 136–145)
SP GR UR STRIP: >1.03 (ref 1–1.03)
SQUAMOUS #/AREA URNS HPF: ABNORMAL /HPF
UROBILINOGEN UR QL STRIP: ABNORMAL
WBC # UR STRIP: ABNORMAL /HPF
WBC NRBC COR # BLD: 9.32 10*3/MM3 (ref 3.4–10.8)
WHOLE BLOOD HOLD SPECIMEN: NORMAL
WHOLE BLOOD HOLD SPECIMEN: NORMAL

## 2022-04-19 PROCEDURE — 25010000002 ONDANSETRON PER 1 MG: Performed by: NURSE PRACTITIONER

## 2022-04-19 PROCEDURE — 25010000002 ONDANSETRON PER 1 MG: Performed by: EMERGENCY MEDICINE

## 2022-04-19 PROCEDURE — 99223 1ST HOSP IP/OBS HIGH 75: CPT | Performed by: FAMILY MEDICINE

## 2022-04-19 PROCEDURE — 99284 EMERGENCY DEPT VISIT MOD MDM: CPT

## 2022-04-19 PROCEDURE — 80053 COMPREHEN METABOLIC PANEL: CPT

## 2022-04-19 PROCEDURE — 25010000002 KETOROLAC TROMETHAMINE PER 15 MG: Performed by: NURSE PRACTITIONER

## 2022-04-19 PROCEDURE — 83690 ASSAY OF LIPASE: CPT

## 2022-04-19 PROCEDURE — 81001 URINALYSIS AUTO W/SCOPE: CPT | Performed by: EMERGENCY MEDICINE

## 2022-04-19 PROCEDURE — 25010000002 HYDROMORPHONE 1 MG/ML SOLUTION: Performed by: EMERGENCY MEDICINE

## 2022-04-19 PROCEDURE — 74177 CT ABD & PELVIS W/CONTRAST: CPT

## 2022-04-19 PROCEDURE — 74181 MRI ABDOMEN W/O CONTRAST: CPT

## 2022-04-19 PROCEDURE — 83735 ASSAY OF MAGNESIUM: CPT | Performed by: FAMILY MEDICINE

## 2022-04-19 PROCEDURE — 0 IOPAMIDOL PER 1 ML: Performed by: EMERGENCY MEDICINE

## 2022-04-19 PROCEDURE — 85025 COMPLETE CBC W/AUTO DIFF WBC: CPT

## 2022-04-19 RX ORDER — CHOLECALCIFEROL (VITAMIN D3) 125 MCG
5 CAPSULE ORAL NIGHTLY PRN
Status: DISCONTINUED | OUTPATIENT
Start: 2022-04-19 | End: 2022-04-22 | Stop reason: HOSPADM

## 2022-04-19 RX ORDER — BISACODYL 5 MG/1
5 TABLET, DELAYED RELEASE ORAL DAILY PRN
Status: DISCONTINUED | OUTPATIENT
Start: 2022-04-19 | End: 2022-04-22 | Stop reason: HOSPADM

## 2022-04-19 RX ORDER — ACETAMINOPHEN 325 MG/1
650 TABLET ORAL EVERY 4 HOURS PRN
Status: DISCONTINUED | OUTPATIENT
Start: 2022-04-19 | End: 2022-04-22 | Stop reason: HOSPADM

## 2022-04-19 RX ORDER — ROSUVASTATIN CALCIUM 10 MG/1
10 TABLET, COATED ORAL NIGHTLY
COMMUNITY
Start: 2022-02-21 | End: 2022-08-23 | Stop reason: SINTOL

## 2022-04-19 RX ORDER — NALOXONE HCL 0.4 MG/ML
0.4 VIAL (ML) INJECTION
Status: DISCONTINUED | OUTPATIENT
Start: 2022-04-19 | End: 2022-04-22 | Stop reason: HOSPADM

## 2022-04-19 RX ORDER — QUETIAPINE FUMARATE 25 MG/1
12.5 TABLET, FILM COATED ORAL NIGHTLY PRN
Status: DISCONTINUED | OUTPATIENT
Start: 2022-04-19 | End: 2022-04-22 | Stop reason: HOSPADM

## 2022-04-19 RX ORDER — SODIUM CHLORIDE 0.9 % (FLUSH) 0.9 %
10 SYRINGE (ML) INJECTION EVERY 12 HOURS SCHEDULED
Status: DISCONTINUED | OUTPATIENT
Start: 2022-04-20 | End: 2022-04-22 | Stop reason: HOSPADM

## 2022-04-19 RX ORDER — ONDANSETRON 2 MG/ML
4 INJECTION INTRAMUSCULAR; INTRAVENOUS ONCE
Status: COMPLETED | OUTPATIENT
Start: 2022-04-19 | End: 2022-04-19

## 2022-04-19 RX ORDER — ONDANSETRON 2 MG/ML
4 INJECTION INTRAMUSCULAR; INTRAVENOUS EVERY 6 HOURS PRN
Status: DISCONTINUED | OUTPATIENT
Start: 2022-04-19 | End: 2022-04-22 | Stop reason: HOSPADM

## 2022-04-19 RX ORDER — SODIUM CHLORIDE, SODIUM LACTATE, POTASSIUM CHLORIDE, CALCIUM CHLORIDE 600; 310; 30; 20 MG/100ML; MG/100ML; MG/100ML; MG/100ML
100 INJECTION, SOLUTION INTRAVENOUS CONTINUOUS
Status: DISCONTINUED | OUTPATIENT
Start: 2022-04-20 | End: 2022-04-22

## 2022-04-19 RX ORDER — BISACODYL 10 MG
10 SUPPOSITORY, RECTAL RECTAL DAILY PRN
Status: DISCONTINUED | OUTPATIENT
Start: 2022-04-19 | End: 2022-04-22 | Stop reason: HOSPADM

## 2022-04-19 RX ORDER — AMOXICILLIN 250 MG
2 CAPSULE ORAL 2 TIMES DAILY
Status: DISCONTINUED | OUTPATIENT
Start: 2022-04-20 | End: 2022-04-22 | Stop reason: HOSPADM

## 2022-04-19 RX ORDER — SODIUM CHLORIDE 0.9 % (FLUSH) 0.9 %
10 SYRINGE (ML) INJECTION AS NEEDED
Status: DISCONTINUED | OUTPATIENT
Start: 2022-04-19 | End: 2022-04-22 | Stop reason: HOSPADM

## 2022-04-19 RX ORDER — MORPHINE SULFATE 2 MG/ML
2 INJECTION, SOLUTION INTRAMUSCULAR; INTRAVENOUS
Status: DISCONTINUED | OUTPATIENT
Start: 2022-04-19 | End: 2022-04-22 | Stop reason: HOSPADM

## 2022-04-19 RX ORDER — POLYETHYLENE GLYCOL 3350 17 G/17G
17 POWDER, FOR SOLUTION ORAL DAILY PRN
Status: DISCONTINUED | OUTPATIENT
Start: 2022-04-19 | End: 2022-04-22 | Stop reason: HOSPADM

## 2022-04-19 RX ORDER — ACETAMINOPHEN 160 MG/5ML
650 SOLUTION ORAL EVERY 4 HOURS PRN
Status: DISCONTINUED | OUTPATIENT
Start: 2022-04-19 | End: 2022-04-22 | Stop reason: HOSPADM

## 2022-04-19 RX ORDER — ACETAMINOPHEN 650 MG/1
650 SUPPOSITORY RECTAL EVERY 4 HOURS PRN
Status: DISCONTINUED | OUTPATIENT
Start: 2022-04-19 | End: 2022-04-22 | Stop reason: HOSPADM

## 2022-04-19 RX ORDER — KETOROLAC TROMETHAMINE 30 MG/ML
30 INJECTION, SOLUTION INTRAMUSCULAR; INTRAVENOUS ONCE
Status: COMPLETED | OUTPATIENT
Start: 2022-04-19 | End: 2022-04-19

## 2022-04-19 RX ADMIN — KETOROLAC TROMETHAMINE 30 MG: 30 INJECTION, SOLUTION INTRAMUSCULAR; INTRAVENOUS at 16:18

## 2022-04-19 RX ADMIN — ONDANSETRON 4 MG: 2 INJECTION INTRAMUSCULAR; INTRAVENOUS at 16:18

## 2022-04-19 RX ADMIN — ONDANSETRON 4 MG: 2 INJECTION INTRAMUSCULAR; INTRAVENOUS at 17:49

## 2022-04-19 RX ADMIN — IOPAMIDOL 100 ML: 755 INJECTION, SOLUTION INTRAVENOUS at 17:25

## 2022-04-19 RX ADMIN — SODIUM CHLORIDE 1000 ML: 9 INJECTION, SOLUTION INTRAVENOUS at 16:18

## 2022-04-19 RX ADMIN — HYDROMORPHONE HYDROCHLORIDE 1 MG: 1 INJECTION, SOLUTION INTRAMUSCULAR; INTRAVENOUS; SUBCUTANEOUS at 17:49

## 2022-04-19 RX ADMIN — SODIUM CHLORIDE 1000 ML: 9 INJECTION, SOLUTION INTRAVENOUS at 17:49

## 2022-04-20 ENCOUNTER — TELEPHONE (OUTPATIENT)
Dept: GASTROENTEROLOGY | Facility: CLINIC | Age: 77
End: 2022-04-20

## 2022-04-20 ENCOUNTER — ANESTHESIA (OUTPATIENT)
Dept: GASTROENTEROLOGY | Facility: HOSPITAL | Age: 77
End: 2022-04-20

## 2022-04-20 ENCOUNTER — APPOINTMENT (OUTPATIENT)
Dept: GENERAL RADIOLOGY | Facility: HOSPITAL | Age: 77
End: 2022-04-20

## 2022-04-20 ENCOUNTER — ANESTHESIA EVENT (OUTPATIENT)
Dept: GASTROENTEROLOGY | Facility: HOSPITAL | Age: 77
End: 2022-04-20

## 2022-04-20 PROBLEM — K85.10 GALLSTONE PANCREATITIS: Status: ACTIVE | Noted: 2022-04-19

## 2022-04-20 PROBLEM — R74.8 ELEVATED LIVER ENZYMES: Status: ACTIVE | Noted: 2022-04-20

## 2022-04-20 PROBLEM — E87.6 HYPOKALEMIA: Status: ACTIVE | Noted: 2022-04-20

## 2022-04-20 PROBLEM — Z98.890 S/P ERCP: Status: ACTIVE | Noted: 2022-04-20

## 2022-04-20 PROBLEM — R10.13 EPIGASTRIC PAIN: Status: ACTIVE | Noted: 2022-04-20

## 2022-04-20 LAB
ANION GAP SERPL CALCULATED.3IONS-SCNC: 8.7 MMOL/L (ref 5–15)
BASOPHILS # BLD AUTO: 0.01 10*3/MM3 (ref 0–0.2)
BASOPHILS NFR BLD AUTO: 0.1 % (ref 0–1.5)
BUN SERPL-MCNC: 8 MG/DL (ref 8–23)
BUN/CREAT SERPL: 14 (ref 7–25)
CALCIUM SPEC-SCNC: 8.4 MG/DL (ref 8.6–10.5)
CHLORIDE SERPL-SCNC: 104 MMOL/L (ref 98–107)
CO2 SERPL-SCNC: 27.3 MMOL/L (ref 22–29)
CREAT SERPL-MCNC: 0.57 MG/DL (ref 0.57–1)
DEPRECATED RDW RBC AUTO: 48.5 FL (ref 37–54)
EGFRCR SERPLBLD CKD-EPI 2021: 92.6 ML/MIN/1.73
EOSINOPHIL # BLD AUTO: 0.06 10*3/MM3 (ref 0–0.4)
EOSINOPHIL NFR BLD AUTO: 0.7 % (ref 0.3–6.2)
ERYTHROCYTE [DISTWIDTH] IN BLOOD BY AUTOMATED COUNT: 13.3 % (ref 12.3–15.4)
GLUCOSE SERPL-MCNC: 103 MG/DL (ref 65–99)
HCT VFR BLD AUTO: 31 % (ref 34–46.6)
HGB BLD-MCNC: 10.1 G/DL (ref 12–15.9)
IMM GRANULOCYTES # BLD AUTO: 0.06 10*3/MM3 (ref 0–0.05)
IMM GRANULOCYTES NFR BLD AUTO: 0.7 % (ref 0–0.5)
LYMPHOCYTES # BLD AUTO: 1.11 10*3/MM3 (ref 0.7–3.1)
LYMPHOCYTES NFR BLD AUTO: 12.5 % (ref 19.6–45.3)
MAGNESIUM SERPL-MCNC: 1.6 MG/DL (ref 1.6–2.4)
MCH RBC QN AUTO: 32.3 PG (ref 26.6–33)
MCHC RBC AUTO-ENTMCNC: 32.6 G/DL (ref 31.5–35.7)
MCV RBC AUTO: 99 FL (ref 79–97)
MONOCYTES # BLD AUTO: 0.69 10*3/MM3 (ref 0.1–0.9)
MONOCYTES NFR BLD AUTO: 7.8 % (ref 5–12)
NEUTROPHILS NFR BLD AUTO: 6.94 10*3/MM3 (ref 1.7–7)
NEUTROPHILS NFR BLD AUTO: 78.2 % (ref 42.7–76)
NRBC BLD AUTO-RTO: 0 /100 WBC (ref 0–0.2)
PLATELET # BLD AUTO: 230 10*3/MM3 (ref 140–450)
PMV BLD AUTO: 9.9 FL (ref 6–12)
POTASSIUM SERPL-SCNC: 3.3 MMOL/L (ref 3.5–5.2)
RBC # BLD AUTO: 3.13 10*6/MM3 (ref 3.77–5.28)
SODIUM SERPL-SCNC: 140 MMOL/L (ref 136–145)
WBC NRBC COR # BLD: 8.87 10*3/MM3 (ref 3.4–10.8)

## 2022-04-20 PROCEDURE — C2625 STENT, NON-COR, TEM W/DEL SY: HCPCS | Performed by: INTERNAL MEDICINE

## 2022-04-20 PROCEDURE — 99222 1ST HOSP IP/OBS MODERATE 55: CPT | Performed by: INTERNAL MEDICINE

## 2022-04-20 PROCEDURE — 43274 ERCP DUCT STENT PLACEMENT: CPT | Performed by: INTERNAL MEDICINE

## 2022-04-20 PROCEDURE — 25010000002 PROPOFOL 10 MG/ML EMULSION: Performed by: NURSE ANESTHETIST, CERTIFIED REGISTERED

## 2022-04-20 PROCEDURE — 83735 ASSAY OF MAGNESIUM: CPT | Performed by: FAMILY MEDICINE

## 2022-04-20 PROCEDURE — 0FC98ZZ EXTIRPATION OF MATTER FROM COMMON BILE DUCT, VIA NATURAL OR ARTIFICIAL OPENING ENDOSCOPIC: ICD-10-PCS | Performed by: INTERNAL MEDICINE

## 2022-04-20 PROCEDURE — C1876 STENT, NON-COA/NON-COV W/DEL: HCPCS | Performed by: INTERNAL MEDICINE

## 2022-04-20 PROCEDURE — 25010000002 ATROPINE PER 0.01 MG: Performed by: NURSE ANESTHETIST, CERTIFIED REGISTERED

## 2022-04-20 PROCEDURE — 99233 SBSQ HOSP IP/OBS HIGH 50: CPT | Performed by: INTERNAL MEDICINE

## 2022-04-20 PROCEDURE — 85025 COMPLETE CBC W/AUTO DIFF WBC: CPT | Performed by: FAMILY MEDICINE

## 2022-04-20 PROCEDURE — C1769 GUIDE WIRE: HCPCS | Performed by: INTERNAL MEDICINE

## 2022-04-20 PROCEDURE — 43273 ENDOSCOPIC PANCREATOSCOPY: CPT | Performed by: INTERNAL MEDICINE

## 2022-04-20 PROCEDURE — 0F798DZ DILATION OF COMMON BILE DUCT WITH INTRALUMINAL DEVICE, VIA NATURAL OR ARTIFICIAL OPENING ENDOSCOPIC: ICD-10-PCS | Performed by: INTERNAL MEDICINE

## 2022-04-20 PROCEDURE — 80048 BASIC METABOLIC PNL TOTAL CA: CPT | Performed by: FAMILY MEDICINE

## 2022-04-20 PROCEDURE — 25010000002 IOPAMIDOL 61 % SOLUTION: Performed by: INTERNAL MEDICINE

## 2022-04-20 PROCEDURE — 43264 ERCP REMOVE DUCT CALCULI: CPT | Performed by: INTERNAL MEDICINE

## 2022-04-20 PROCEDURE — BF141ZZ FLUOROSCOPY OF GALLBLADDER, BILE DUCTS AND PANCREATIC DUCTS USING LOW OSMOLAR CONTRAST: ICD-10-PCS | Performed by: INTERNAL MEDICINE

## 2022-04-20 PROCEDURE — 74330 X-RAY BILE/PANC ENDOSCOPY: CPT

## 2022-04-20 PROCEDURE — 0F7D8DZ DILATION OF PANCREATIC DUCT WITH INTRALUMINAL DEVICE, VIA NATURAL OR ARTIFICIAL OPENING ENDOSCOPIC: ICD-10-PCS | Performed by: INTERNAL MEDICINE

## 2022-04-20 PROCEDURE — 25010000002 ONDANSETRON PER 1 MG: Performed by: INTERNAL MEDICINE

## 2022-04-20 DEVICE — STNT PANC GEENEN ST W/GW 5F 5CM: Type: IMPLANTABLE DEVICE | Site: BILE DUCT | Status: FUNCTIONAL

## 2022-04-20 DEVICE — BILIARY STENT
Type: IMPLANTABLE DEVICE | Site: BILE DUCT | Status: FUNCTIONAL
Brand: ADVANIX™ BILIARY

## 2022-04-20 RX ORDER — IBUPROFEN 400 MG/1
400 TABLET ORAL EVERY 4 HOURS PRN
Status: DISCONTINUED | OUTPATIENT
Start: 2022-04-20 | End: 2022-04-22 | Stop reason: HOSPADM

## 2022-04-20 RX ORDER — KETAMINE HCL IN NACL, ISO-OSM 100MG/10ML
SYRINGE (ML) INJECTION AS NEEDED
Status: DISCONTINUED | OUTPATIENT
Start: 2022-04-20 | End: 2022-04-20 | Stop reason: SURG

## 2022-04-20 RX ORDER — LIDOCAINE HYDROCHLORIDE 20 MG/ML
INJECTION, SOLUTION EPIDURAL; INFILTRATION; INTRACAUDAL; PERINEURAL AS NEEDED
Status: DISCONTINUED | OUTPATIENT
Start: 2022-04-20 | End: 2022-04-20 | Stop reason: SURG

## 2022-04-20 RX ORDER — ATROPINE SULFATE 0.4 MG/ML
AMPUL (ML) INJECTION AS NEEDED
Status: DISCONTINUED | OUTPATIENT
Start: 2022-04-20 | End: 2022-04-20 | Stop reason: SURG

## 2022-04-20 RX ORDER — SODIUM CHLORIDE, SODIUM LACTATE, POTASSIUM CHLORIDE, CALCIUM CHLORIDE 600; 310; 30; 20 MG/100ML; MG/100ML; MG/100ML; MG/100ML
30 INJECTION, SOLUTION INTRAVENOUS CONTINUOUS
Status: DISCONTINUED | OUTPATIENT
Start: 2022-04-20 | End: 2022-04-20

## 2022-04-20 RX ORDER — POTASSIUM CHLORIDE 750 MG/1
40 CAPSULE, EXTENDED RELEASE ORAL ONCE
Status: COMPLETED | OUTPATIENT
Start: 2022-04-20 | End: 2022-04-20

## 2022-04-20 RX ORDER — SODIUM CHLORIDE, SODIUM LACTATE, POTASSIUM CHLORIDE, CALCIUM CHLORIDE 600; 310; 30; 20 MG/100ML; MG/100ML; MG/100ML; MG/100ML
30 INJECTION, SOLUTION INTRAVENOUS CONTINUOUS PRN
Status: DISCONTINUED | OUTPATIENT
Start: 2022-04-20 | End: 2022-04-20

## 2022-04-20 RX ADMIN — SODIUM CHLORIDE, POTASSIUM CHLORIDE, SODIUM LACTATE AND CALCIUM CHLORIDE: 600; 310; 30; 20 INJECTION, SOLUTION INTRAVENOUS at 11:46

## 2022-04-20 RX ADMIN — Medication 10 ML: at 00:16

## 2022-04-20 RX ADMIN — SODIUM CHLORIDE, POTASSIUM CHLORIDE, SODIUM LACTATE AND CALCIUM CHLORIDE 300 ML/HR: 600; 310; 30; 20 INJECTION, SOLUTION INTRAVENOUS at 06:37

## 2022-04-20 RX ADMIN — SODIUM CHLORIDE, POTASSIUM CHLORIDE, SODIUM LACTATE AND CALCIUM CHLORIDE 300 ML/HR: 600; 310; 30; 20 INJECTION, SOLUTION INTRAVENOUS at 00:14

## 2022-04-20 RX ADMIN — Medication 10 MG: at 11:30

## 2022-04-20 RX ADMIN — ATROPINE SULFATE 0.2 MG: 0.4 INJECTION, SOLUTION INTRAMUSCULAR; INTRAVENOUS; SUBCUTANEOUS at 11:23

## 2022-04-20 RX ADMIN — IBUPROFEN 400 MG: 400 TABLET, FILM COATED ORAL at 15:16

## 2022-04-20 RX ADMIN — INDOMETHACIN 100 MG: 50 SUPPOSITORY RECTAL at 09:57

## 2022-04-20 RX ADMIN — LIDOCAINE HYDROCHLORIDE 40 MG: 20 INJECTION, SOLUTION EPIDURAL; INFILTRATION; INTRACAUDAL; PERINEURAL at 11:15

## 2022-04-20 RX ADMIN — Medication 10 MG: at 11:12

## 2022-04-20 RX ADMIN — PROPOFOL 250 MCG/KG/MIN: 10 INJECTION, EMULSION INTRAVENOUS at 11:15

## 2022-04-20 RX ADMIN — SODIUM CHLORIDE, POTASSIUM CHLORIDE, SODIUM LACTATE AND CALCIUM CHLORIDE 300 ML/HR: 600; 310; 30; 20 INJECTION, SOLUTION INTRAVENOUS at 03:21

## 2022-04-20 RX ADMIN — Medication 10 ML: at 20:30

## 2022-04-20 RX ADMIN — SODIUM CHLORIDE, POTASSIUM CHLORIDE, SODIUM LACTATE AND CALCIUM CHLORIDE 300 ML/HR: 600; 310; 30; 20 INJECTION, SOLUTION INTRAVENOUS at 06:38

## 2022-04-20 RX ADMIN — SODIUM CHLORIDE, POTASSIUM CHLORIDE, SODIUM LACTATE AND CALCIUM CHLORIDE 200 ML/HR: 600; 310; 30; 20 INJECTION, SOLUTION INTRAVENOUS at 18:29

## 2022-04-20 RX ADMIN — SENNOSIDES AND DOCUSATE SODIUM 2 TABLET: 50; 8.6 TABLET ORAL at 20:29

## 2022-04-20 RX ADMIN — ONDANSETRON 4 MG: 2 INJECTION INTRAMUSCULAR; INTRAVENOUS at 15:15

## 2022-04-20 RX ADMIN — SODIUM CHLORIDE, POTASSIUM CHLORIDE, SODIUM LACTATE AND CALCIUM CHLORIDE 200 ML/HR: 600; 310; 30; 20 INJECTION, SOLUTION INTRAVENOUS at 23:18

## 2022-04-20 RX ADMIN — SENNOSIDES AND DOCUSATE SODIUM 2 TABLET: 50; 8.6 TABLET ORAL at 00:15

## 2022-04-20 RX ADMIN — Medication 10 MG: at 11:20

## 2022-04-20 RX ADMIN — POTASSIUM CHLORIDE 40 MEQ: 750 CAPSULE, EXTENDED RELEASE ORAL at 15:15

## 2022-04-20 RX ADMIN — IBUPROFEN 400 MG: 400 TABLET, FILM COATED ORAL at 19:30

## 2022-04-20 NOTE — ANESTHESIA POSTPROCEDURE EVALUATION
Patient: Ely Coats    Procedure Summary     Date: 04/20/22 Room / Location: [facility] ENDOSCOPY 4 / [facility]    Anesthesia Start: 1106 Anesthesia Stop: 1203    Procedure: ENDOSCOPIC RETROGRADE CHOLANGIOPANCREATOGRAPHY WITH SPHINCTEROTOMY, BALLOON SWEEP, COMMON BILE DUCT STENT PLACED, PANCREATIC DUCT STENT PLACED (N/A ) Diagnosis:       Acute pancreatitis, unspecified complication status, unspecified pancreatitis type      (Acute pancreatitis, unspecified complication status, unspecified pancreatitis type [K85.90])    Surgeons: Gurvinder Perry MD Provider: Quang Borden MD    Anesthesia Type: general ASA Status: 3          Anesthesia Type: general    Vitals  Vitals Value Taken Time   /76 04/20/22 1227   Temp 36.6 °C (97.9 °F) 04/20/22 1205   Pulse 58 04/20/22 1231   Resp 20 04/20/22 1225   SpO2 93 % 04/20/22 1231   Vitals shown include unvalidated device data.        Post Anesthesia Care and Evaluation    Patient location during evaluation: bedside  Patient participation: complete - patient participated  Level of consciousness: awake  Pain management: adequate  Airway patency: patent  Anesthetic complications: No anesthetic complications  PONV Status: none  Cardiovascular status: acceptable  Respiratory status: acceptable  Hydration status: acceptable    Comments: An Anesthesiologist personally participated in the most demanding procedures (including induction and emergence if applicable) in the anesthesia plan, monitored the course of anesthesia administration at frequent intervals and remained physically present and available for immediate diagnosis and treatment of emergencies.

## 2022-04-20 NOTE — ANESTHESIA PREPROCEDURE EVALUATION
Anesthesia Evaluation     Patient summary reviewed and Nursing notes reviewed                Airway   Mallampati: I  TM distance: >3 FB  Neck ROM: full  No difficulty expected  Dental          Pulmonary - negative pulmonary ROS and normal exam    breath sounds clear to auscultation  Cardiovascular - normal exam    Rhythm: regular  Rate: normal    (+) hypertension, dysrhythmias Paroxysmal Atrial Fib, hyperlipidemia,       Neuro/Psych- negative ROS  GI/Hepatic/Renal/Endo    (+)   renal disease,     Musculoskeletal     Abdominal    Substance History - negative use     OB/GYN negative ob/gyn ROS         Other   arthritis,                      Anesthesia Plan    ASA 3     general     intravenous induction     Anesthetic plan, all risks, benefits, and alternatives have been provided, discussed and informed consent has been obtained with: patient.        CODE STATUS:    Code Status (Patient has no pulse and is not breathing): CPR (Attempt to Resuscitate)  Medical Interventions (Patient has pulse or is breathing): Full Support

## 2022-04-21 ENCOUNTER — PREP FOR SURGERY (OUTPATIENT)
Dept: OTHER | Facility: HOSPITAL | Age: 77
End: 2022-04-21

## 2022-04-21 DIAGNOSIS — Z46.89 ENCOUNTER FOR REMOVAL OF BILIARY STENT: ICD-10-CM

## 2022-04-21 DIAGNOSIS — Z46.89 ENCOUNTER FOR REMOVAL OF PANCREATIC STENT: Primary | ICD-10-CM

## 2022-04-21 PROBLEM — E87.6 HYPOKALEMIA: Status: RESOLVED | Noted: 2022-04-20 | Resolved: 2022-04-21

## 2022-04-21 PROBLEM — R10.13 EPIGASTRIC PAIN: Status: RESOLVED | Noted: 2022-04-20 | Resolved: 2022-04-21

## 2022-04-21 LAB
ALBUMIN SERPL-MCNC: 2.8 G/DL (ref 3.5–5.2)
ALBUMIN/GLOB SERPL: 1.3 G/DL
ALP SERPL-CCNC: 70 U/L (ref 39–117)
ALT SERPL W P-5'-P-CCNC: 35 U/L (ref 1–33)
ANION GAP SERPL CALCULATED.3IONS-SCNC: 7.6 MMOL/L (ref 5–15)
AST SERPL-CCNC: 16 U/L (ref 1–32)
BILIRUB SERPL-MCNC: 0.3 MG/DL (ref 0–1.2)
BUN SERPL-MCNC: 4 MG/DL (ref 8–23)
BUN/CREAT SERPL: 7.5 (ref 7–25)
CALCIUM SPEC-SCNC: 8.4 MG/DL (ref 8.6–10.5)
CHLORIDE SERPL-SCNC: 109 MMOL/L (ref 98–107)
CO2 SERPL-SCNC: 24.4 MMOL/L (ref 22–29)
CREAT SERPL-MCNC: 0.53 MG/DL (ref 0.57–1)
DEPRECATED RDW RBC AUTO: 47.4 FL (ref 37–54)
EGFRCR SERPLBLD CKD-EPI 2021: 94.2 ML/MIN/1.73
ERYTHROCYTE [DISTWIDTH] IN BLOOD BY AUTOMATED COUNT: 13.2 % (ref 12.3–15.4)
GLOBULIN UR ELPH-MCNC: 2.2 GM/DL
GLUCOSE SERPL-MCNC: 125 MG/DL (ref 65–99)
HCT VFR BLD AUTO: 30.9 % (ref 34–46.6)
HGB BLD-MCNC: 10.2 G/DL (ref 12–15.9)
MCH RBC QN AUTO: 32.5 PG (ref 26.6–33)
MCHC RBC AUTO-ENTMCNC: 33 G/DL (ref 31.5–35.7)
MCV RBC AUTO: 98.4 FL (ref 79–97)
PLATELET # BLD AUTO: 213 10*3/MM3 (ref 140–450)
PMV BLD AUTO: 10.2 FL (ref 6–12)
POTASSIUM SERPL-SCNC: 3.7 MMOL/L (ref 3.5–5.2)
PROT SERPL-MCNC: 5 G/DL (ref 6–8.5)
RBC # BLD AUTO: 3.14 10*6/MM3 (ref 3.77–5.28)
SODIUM SERPL-SCNC: 141 MMOL/L (ref 136–145)
WBC NRBC COR # BLD: 10.37 10*3/MM3 (ref 3.4–10.8)

## 2022-04-21 PROCEDURE — 25010000002 MORPHINE PER 10 MG: Performed by: INTERNAL MEDICINE

## 2022-04-21 PROCEDURE — 85027 COMPLETE CBC AUTOMATED: CPT | Performed by: INTERNAL MEDICINE

## 2022-04-21 PROCEDURE — 80053 COMPREHEN METABOLIC PANEL: CPT | Performed by: INTERNAL MEDICINE

## 2022-04-21 PROCEDURE — 99233 SBSQ HOSP IP/OBS HIGH 50: CPT | Performed by: INTERNAL MEDICINE

## 2022-04-21 PROCEDURE — 25010000002 ONDANSETRON PER 1 MG: Performed by: INTERNAL MEDICINE

## 2022-04-21 PROCEDURE — 99232 SBSQ HOSP IP/OBS MODERATE 35: CPT | Performed by: SURGERY

## 2022-04-21 RX ORDER — CARVEDILOL 3.12 MG/1
3.12 TABLET ORAL 2 TIMES DAILY WITH MEALS
Status: DISCONTINUED | OUTPATIENT
Start: 2022-04-21 | End: 2022-04-22 | Stop reason: HOSPADM

## 2022-04-21 RX ORDER — ROSUVASTATIN CALCIUM 5 MG/1
10 TABLET, COATED ORAL NIGHTLY
Status: DISCONTINUED | OUTPATIENT
Start: 2022-04-21 | End: 2022-04-22 | Stop reason: HOSPADM

## 2022-04-21 RX ORDER — SODIUM CHLORIDE, SODIUM LACTATE, POTASSIUM CHLORIDE, CALCIUM CHLORIDE 600; 310; 30; 20 MG/100ML; MG/100ML; MG/100ML; MG/100ML
30 INJECTION, SOLUTION INTRAVENOUS CONTINUOUS PRN
OUTPATIENT
Start: 2022-04-21

## 2022-04-21 RX ADMIN — SODIUM CHLORIDE, POTASSIUM CHLORIDE, SODIUM LACTATE AND CALCIUM CHLORIDE 200 ML/HR: 600; 310; 30; 20 INJECTION, SOLUTION INTRAVENOUS at 04:21

## 2022-04-21 RX ADMIN — Medication 10 ML: at 20:36

## 2022-04-21 RX ADMIN — IBUPROFEN 400 MG: 400 TABLET, FILM COATED ORAL at 20:33

## 2022-04-21 RX ADMIN — Medication 10 ML: at 10:43

## 2022-04-21 RX ADMIN — CARVEDILOL 3.12 MG: 3.12 TABLET, FILM COATED ORAL at 10:08

## 2022-04-21 RX ADMIN — ROSUVASTATIN CALCIUM 10 MG: 5 TABLET, FILM COATED ORAL at 20:33

## 2022-04-21 RX ADMIN — MORPHINE SULFATE 2 MG: 2 INJECTION, SOLUTION INTRAMUSCULAR; INTRAVENOUS at 06:32

## 2022-04-21 RX ADMIN — CARVEDILOL 3.12 MG: 3.12 TABLET, FILM COATED ORAL at 19:22

## 2022-04-21 RX ADMIN — SODIUM CHLORIDE, POTASSIUM CHLORIDE, SODIUM LACTATE AND CALCIUM CHLORIDE 100 ML/HR: 600; 310; 30; 20 INJECTION, SOLUTION INTRAVENOUS at 10:56

## 2022-04-21 RX ADMIN — ONDANSETRON 4 MG: 2 INJECTION INTRAMUSCULAR; INTRAVENOUS at 06:33

## 2022-04-22 ENCOUNTER — ANESTHESIA (OUTPATIENT)
Dept: PERIOP | Facility: HOSPITAL | Age: 77
End: 2022-04-22

## 2022-04-22 ENCOUNTER — ANESTHESIA EVENT (OUTPATIENT)
Dept: PERIOP | Facility: HOSPITAL | Age: 77
End: 2022-04-22

## 2022-04-22 VITALS
SYSTOLIC BLOOD PRESSURE: 130 MMHG | HEIGHT: 64 IN | OXYGEN SATURATION: 97 % | DIASTOLIC BLOOD PRESSURE: 63 MMHG | HEART RATE: 66 BPM | BODY MASS INDEX: 29.96 KG/M2 | WEIGHT: 175.49 LBS | TEMPERATURE: 98.2 F | RESPIRATION RATE: 16 BRPM

## 2022-04-22 PROBLEM — R74.8 ELEVATED LIVER ENZYMES: Status: RESOLVED | Noted: 2022-04-20 | Resolved: 2022-04-22

## 2022-04-22 PROBLEM — K85.10 GALLSTONE PANCREATITIS: Status: RESOLVED | Noted: 2022-04-19 | Resolved: 2022-04-22

## 2022-04-22 LAB
ALBUMIN SERPL-MCNC: 2.8 G/DL (ref 3.5–5.2)
ALBUMIN/GLOB SERPL: 1.3 G/DL
ALP SERPL-CCNC: 75 U/L (ref 39–117)
ALT SERPL W P-5'-P-CCNC: 29 U/L (ref 1–33)
ANION GAP SERPL CALCULATED.3IONS-SCNC: 10.7 MMOL/L (ref 5–15)
AST SERPL-CCNC: 13 U/L (ref 1–32)
BILIRUB SERPL-MCNC: 0.4 MG/DL (ref 0–1.2)
BUN SERPL-MCNC: 4 MG/DL (ref 8–23)
BUN/CREAT SERPL: 7 (ref 7–25)
CALCIUM SPEC-SCNC: 8.7 MG/DL (ref 8.6–10.5)
CHLORIDE SERPL-SCNC: 107 MMOL/L (ref 98–107)
CO2 SERPL-SCNC: 26.3 MMOL/L (ref 22–29)
CREAT SERPL-MCNC: 0.57 MG/DL (ref 0.57–1)
DEPRECATED RDW RBC AUTO: 48.6 FL (ref 37–54)
EGFRCR SERPLBLD CKD-EPI 2021: 92.6 ML/MIN/1.73
ERYTHROCYTE [DISTWIDTH] IN BLOOD BY AUTOMATED COUNT: 13.5 % (ref 12.3–15.4)
GLOBULIN UR ELPH-MCNC: 2.2 GM/DL
GLUCOSE SERPL-MCNC: 99 MG/DL (ref 65–99)
HCT VFR BLD AUTO: 31.1 % (ref 34–46.6)
HGB BLD-MCNC: 10.3 G/DL (ref 12–15.9)
LIPASE SERPL-CCNC: 101 U/L (ref 13–60)
MCH RBC QN AUTO: 32.8 PG (ref 26.6–33)
MCHC RBC AUTO-ENTMCNC: 33.1 G/DL (ref 31.5–35.7)
MCV RBC AUTO: 99 FL (ref 79–97)
PLATELET # BLD AUTO: 228 10*3/MM3 (ref 140–450)
PMV BLD AUTO: 10.7 FL (ref 6–12)
POTASSIUM SERPL-SCNC: 3.4 MMOL/L (ref 3.5–5.2)
PROT SERPL-MCNC: 5 G/DL (ref 6–8.5)
RBC # BLD AUTO: 3.14 10*6/MM3 (ref 3.77–5.28)
SODIUM SERPL-SCNC: 144 MMOL/L (ref 136–145)
WBC NRBC COR # BLD: 6.14 10*3/MM3 (ref 3.4–10.8)

## 2022-04-22 PROCEDURE — 85027 COMPLETE CBC AUTOMATED: CPT | Performed by: INTERNAL MEDICINE

## 2022-04-22 PROCEDURE — 83690 ASSAY OF LIPASE: CPT | Performed by: SURGERY

## 2022-04-22 PROCEDURE — 99239 HOSP IP/OBS DSCHRG MGMT >30: CPT | Performed by: INTERNAL MEDICINE

## 2022-04-22 PROCEDURE — 80053 COMPREHEN METABOLIC PANEL: CPT | Performed by: INTERNAL MEDICINE

## 2022-04-22 RX ORDER — INDOCYANINE GREEN AND WATER 25 MG
1.25 KIT INJECTION ONCE
Status: CANCELLED | OUTPATIENT
Start: 2022-04-22 | End: 2022-04-22

## 2022-04-22 RX ORDER — SODIUM CHLORIDE, SODIUM LACTATE, POTASSIUM CHLORIDE, CALCIUM CHLORIDE 600; 310; 30; 20 MG/100ML; MG/100ML; MG/100ML; MG/100ML
70 INJECTION, SOLUTION INTRAVENOUS CONTINUOUS
Status: DISCONTINUED | OUTPATIENT
Start: 2022-04-22 | End: 2022-04-22 | Stop reason: HOSPADM

## 2022-04-22 RX ORDER — ONDANSETRON 2 MG/ML
4 INJECTION INTRAMUSCULAR; INTRAVENOUS EVERY 6 HOURS PRN
Status: CANCELLED | OUTPATIENT
Start: 2022-04-22

## 2022-04-22 RX ORDER — FUROSEMIDE 20 MG/1
20 TABLET ORAL DAILY
Status: DISCONTINUED | OUTPATIENT
Start: 2022-04-23 | End: 2022-04-22 | Stop reason: HOSPADM

## 2022-04-22 RX ORDER — CARVEDILOL 6.25 MG/1
3.12 TABLET ORAL 2 TIMES DAILY WITH MEALS
Start: 2022-04-22 | End: 2022-08-23 | Stop reason: DRUGHIGH

## 2022-04-22 RX ORDER — SODIUM CHLORIDE 0.9 % (FLUSH) 0.9 %
10 SYRINGE (ML) INJECTION EVERY 12 HOURS SCHEDULED
Status: DISCONTINUED | OUTPATIENT
Start: 2022-04-22 | End: 2022-04-22 | Stop reason: HOSPADM

## 2022-04-22 RX ORDER — SODIUM CHLORIDE 0.9 % (FLUSH) 0.9 %
10 SYRINGE (ML) INJECTION AS NEEDED
Status: DISCONTINUED | OUTPATIENT
Start: 2022-04-22 | End: 2022-04-22 | Stop reason: HOSPADM

## 2022-04-22 RX ADMIN — SODIUM CHLORIDE, POTASSIUM CHLORIDE, SODIUM LACTATE AND CALCIUM CHLORIDE 100 ML/HR: 600; 310; 30; 20 INJECTION, SOLUTION INTRAVENOUS at 09:15

## 2022-04-22 RX ADMIN — CARVEDILOL 3.12 MG: 3.12 TABLET, FILM COATED ORAL at 08:44

## 2022-04-23 ENCOUNTER — READMISSION MANAGEMENT (OUTPATIENT)
Dept: CALL CENTER | Facility: HOSPITAL | Age: 77
End: 2022-04-23

## 2022-04-23 NOTE — OUTREACH NOTE
Prep Survey    Flowsheet Row Responses   [facility] facility patient discharged from? [facility]   Is LACE score < 7 ? Yes   Emergency Room discharge w/ pulse ox? No   Eligibility Not Eligible   What are the reasons patient is not eligible? Other  [Low risk for readmission]   Does the patient have one of the following disease processes/diagnoses(primary or secondary)? Other   Prep survey completed? Yes          LANIE JAMES - Registered Nurse

## 2022-04-25 ENCOUNTER — PREP FOR SURGERY (OUTPATIENT)
Dept: OTHER | Facility: HOSPITAL | Age: 77
End: 2022-04-25

## 2022-04-25 DIAGNOSIS — Z46.89 ENCOUNTER FOR REMOVAL OF BILIARY STENT: Primary | ICD-10-CM

## 2022-05-04 ENCOUNTER — ANESTHESIA (OUTPATIENT)
Dept: PERIOP | Facility: HOSPITAL | Age: 77
End: 2022-05-04

## 2022-05-04 ENCOUNTER — HOSPITAL ENCOUNTER (OUTPATIENT)
Facility: HOSPITAL | Age: 77
Setting detail: HOSPITAL OUTPATIENT SURGERY
Discharge: HOME | End: 2022-05-04
Attending: SURGERY | Admitting: SURGERY

## 2022-05-04 ENCOUNTER — ANESTHESIA EVENT (OUTPATIENT)
Dept: PERIOP | Facility: HOSPITAL | Age: 77
End: 2022-05-04

## 2022-05-04 VITALS
HEIGHT: 64 IN | RESPIRATION RATE: 16 BRPM | TEMPERATURE: 97.1 F | OXYGEN SATURATION: 95 % | SYSTOLIC BLOOD PRESSURE: 138 MMHG | WEIGHT: 159.17 LBS | DIASTOLIC BLOOD PRESSURE: 55 MMHG | BODY MASS INDEX: 27.17 KG/M2 | HEART RATE: 56 BPM

## 2022-05-04 DIAGNOSIS — K85.10 GALLSTONE PANCREATITIS: ICD-10-CM

## 2022-05-04 LAB
GLUCOSE BLDC GLUCOMTR-MCNC: 124 MG/DL (ref 70–99)
QT INTERVAL: 435 MS

## 2022-05-04 PROCEDURE — 88304 TISSUE EXAM BY PATHOLOGIST: CPT | Performed by: SURGERY

## 2022-05-04 PROCEDURE — 25010000002 PROPOFOL 10 MG/ML EMULSION: Performed by: NURSE ANESTHETIST, CERTIFIED REGISTERED

## 2022-05-04 PROCEDURE — 25010000002 FENTANYL CITRATE (PF) 50 MCG/ML SOLUTION: Performed by: NURSE ANESTHETIST, CERTIFIED REGISTERED

## 2022-05-04 PROCEDURE — 25010000002 DEXAMETHASONE PER 1 MG: Performed by: NURSE ANESTHETIST, CERTIFIED REGISTERED

## 2022-05-04 PROCEDURE — 47562 LAPAROSCOPIC CHOLECYSTECTOMY: CPT | Performed by: SPECIALIST/TECHNOLOGIST, OTHER

## 2022-05-04 PROCEDURE — 93010 ELECTROCARDIOGRAM REPORT: CPT | Performed by: INTERNAL MEDICINE

## 2022-05-04 PROCEDURE — 47562 LAPAROSCOPIC CHOLECYSTECTOMY: CPT | Performed by: SURGERY

## 2022-05-04 PROCEDURE — 93005 ELECTROCARDIOGRAM TRACING: CPT | Performed by: SURGERY

## 2022-05-04 PROCEDURE — 82962 GLUCOSE BLOOD TEST: CPT

## 2022-05-04 PROCEDURE — 25010000002 ONDANSETRON PER 1 MG: Performed by: NURSE ANESTHETIST, CERTIFIED REGISTERED

## 2022-05-04 PROCEDURE — 25010000002 MIDAZOLAM PER 1 MG: Performed by: ANESTHESIOLOGY

## 2022-05-04 DEVICE — LIGACLIP 10-M/L, 10MM ENDOSCOPIC ROTATING MULTIPLE CLIP APPLIERS
Type: IMPLANTABLE DEVICE | Site: ABDOMEN | Status: FUNCTIONAL
Brand: LIGACLIP

## 2022-05-04 RX ORDER — MAGNESIUM HYDROXIDE 1200 MG/15ML
LIQUID ORAL AS NEEDED
Status: DISCONTINUED | OUTPATIENT
Start: 2022-05-04 | End: 2022-05-04 | Stop reason: HOSPADM

## 2022-05-04 RX ORDER — PROPOFOL 10 MG/ML
VIAL (ML) INTRAVENOUS AS NEEDED
Status: DISCONTINUED | OUTPATIENT
Start: 2022-05-04 | End: 2022-05-04 | Stop reason: SURG

## 2022-05-04 RX ORDER — MEPERIDINE HYDROCHLORIDE 25 MG/ML
12.5 INJECTION INTRAMUSCULAR; INTRAVENOUS; SUBCUTANEOUS
Status: DISCONTINUED | OUTPATIENT
Start: 2022-05-04 | End: 2022-05-04 | Stop reason: HOSPADM

## 2022-05-04 RX ORDER — BUPIVACAINE HYDROCHLORIDE AND EPINEPHRINE 2.5; 5 MG/ML; UG/ML
INJECTION, SOLUTION EPIDURAL; INFILTRATION; INTRACAUDAL; PERINEURAL AS NEEDED
Status: DISCONTINUED | OUTPATIENT
Start: 2022-05-04 | End: 2022-05-04 | Stop reason: HOSPADM

## 2022-05-04 RX ORDER — IBUPROFEN 600 MG/1
600 TABLET ORAL EVERY 6 HOURS PRN
Status: DISCONTINUED | OUTPATIENT
Start: 2022-05-04 | End: 2022-05-04 | Stop reason: HOSPADM

## 2022-05-04 RX ORDER — DEXAMETHASONE SODIUM PHOSPHATE 4 MG/ML
INJECTION, SOLUTION INTRA-ARTICULAR; INTRALESIONAL; INTRAMUSCULAR; INTRAVENOUS; SOFT TISSUE AS NEEDED
Status: DISCONTINUED | OUTPATIENT
Start: 2022-05-04 | End: 2022-05-04 | Stop reason: SURG

## 2022-05-04 RX ORDER — ONDANSETRON 2 MG/ML
4 INJECTION INTRAMUSCULAR; INTRAVENOUS ONCE AS NEEDED
Status: DISCONTINUED | OUTPATIENT
Start: 2022-05-04 | End: 2022-05-04 | Stop reason: HOSPADM

## 2022-05-04 RX ORDER — HYDROCODONE BITARTRATE AND ACETAMINOPHEN 5; 325 MG/1; MG/1
1 TABLET ORAL ONCE AS NEEDED
Status: DISCONTINUED | OUTPATIENT
Start: 2022-05-04 | End: 2022-05-04 | Stop reason: HOSPADM

## 2022-05-04 RX ORDER — LIDOCAINE HYDROCHLORIDE 20 MG/ML
INJECTION, SOLUTION EPIDURAL; INFILTRATION; INTRACAUDAL; PERINEURAL AS NEEDED
Status: DISCONTINUED | OUTPATIENT
Start: 2022-05-04 | End: 2022-05-04 | Stop reason: SURG

## 2022-05-04 RX ORDER — PROMETHAZINE HYDROCHLORIDE 12.5 MG/1
25 TABLET ORAL ONCE AS NEEDED
Status: DISCONTINUED | OUTPATIENT
Start: 2022-05-04 | End: 2022-05-04 | Stop reason: HOSPADM

## 2022-05-04 RX ORDER — ONDANSETRON 4 MG/1
4 TABLET, FILM COATED ORAL ONCE AS NEEDED
Status: DISCONTINUED | OUTPATIENT
Start: 2022-05-04 | End: 2022-05-04 | Stop reason: HOSPADM

## 2022-05-04 RX ORDER — OXYCODONE HYDROCHLORIDE 5 MG/1
5 TABLET ORAL
Status: COMPLETED | OUTPATIENT
Start: 2022-05-04 | End: 2022-05-04

## 2022-05-04 RX ORDER — ACETAMINOPHEN 500 MG
1000 TABLET ORAL ONCE
Status: COMPLETED | OUTPATIENT
Start: 2022-05-04 | End: 2022-05-04

## 2022-05-04 RX ORDER — FENTANYL CITRATE 50 UG/ML
INJECTION, SOLUTION INTRAMUSCULAR; INTRAVENOUS AS NEEDED
Status: DISCONTINUED | OUTPATIENT
Start: 2022-05-04 | End: 2022-05-04 | Stop reason: SURG

## 2022-05-04 RX ORDER — PROMETHAZINE HYDROCHLORIDE 25 MG/1
25 SUPPOSITORY RECTAL ONCE AS NEEDED
Status: DISCONTINUED | OUTPATIENT
Start: 2022-05-04 | End: 2022-05-04 | Stop reason: HOSPADM

## 2022-05-04 RX ORDER — ROCURONIUM BROMIDE 10 MG/ML
INJECTION, SOLUTION INTRAVENOUS AS NEEDED
Status: DISCONTINUED | OUTPATIENT
Start: 2022-05-04 | End: 2022-05-04 | Stop reason: SURG

## 2022-05-04 RX ORDER — TRAMADOL HYDROCHLORIDE 50 MG/1
50 TABLET ORAL EVERY 6 HOURS PRN
Qty: 10 TABLET | Refills: 0 | Status: SHIPPED | OUTPATIENT
Start: 2022-05-04 | End: 2022-05-10 | Stop reason: SDUPTHER

## 2022-05-04 RX ORDER — MIDAZOLAM HYDROCHLORIDE 1 MG/ML
2 INJECTION INTRAMUSCULAR; INTRAVENOUS ONCE
Status: COMPLETED | OUTPATIENT
Start: 2022-05-04 | End: 2022-05-04

## 2022-05-04 RX ORDER — INDOCYANINE GREEN AND WATER 25 MG
1.25 KIT INJECTION ONCE
Status: COMPLETED | OUTPATIENT
Start: 2022-05-04 | End: 2022-05-04

## 2022-05-04 RX ORDER — SODIUM CHLORIDE, SODIUM LACTATE, POTASSIUM CHLORIDE, CALCIUM CHLORIDE 600; 310; 30; 20 MG/100ML; MG/100ML; MG/100ML; MG/100ML
9 INJECTION, SOLUTION INTRAVENOUS CONTINUOUS PRN
Status: DISCONTINUED | OUTPATIENT
Start: 2022-05-04 | End: 2022-05-04 | Stop reason: HOSPADM

## 2022-05-04 RX ORDER — ONDANSETRON 2 MG/ML
INJECTION INTRAMUSCULAR; INTRAVENOUS AS NEEDED
Status: DISCONTINUED | OUTPATIENT
Start: 2022-05-04 | End: 2022-05-04 | Stop reason: SURG

## 2022-05-04 RX ADMIN — INDOCYANINE GREEN AND WATER 1.25 MG: KIT at 12:32

## 2022-05-04 RX ADMIN — PROPOFOL 140 MG: 10 INJECTION, EMULSION INTRAVENOUS at 13:05

## 2022-05-04 RX ADMIN — MIDAZOLAM HYDROCHLORIDE 2 MG: 1 INJECTION, SOLUTION INTRAMUSCULAR; INTRAVENOUS at 12:31

## 2022-05-04 RX ADMIN — OXYCODONE HYDROCHLORIDE 5 MG: 5 TABLET ORAL at 14:48

## 2022-05-04 RX ADMIN — ACETAMINOPHEN 1000 MG: 500 TABLET ORAL at 11:49

## 2022-05-04 RX ADMIN — SODIUM CHLORIDE, POTASSIUM CHLORIDE, SODIUM LACTATE AND CALCIUM CHLORIDE 9 ML/HR: 600; 310; 30; 20 INJECTION, SOLUTION INTRAVENOUS at 11:49

## 2022-05-04 RX ADMIN — FENTANYL CITRATE 100 MCG: 50 INJECTION, SOLUTION INTRAMUSCULAR; INTRAVENOUS at 13:05

## 2022-05-04 RX ADMIN — DEXAMETHASONE SODIUM PHOSPHATE 4 MG: 4 INJECTION, SOLUTION INTRA-ARTICULAR; INTRALESIONAL; INTRAMUSCULAR; INTRAVENOUS; SOFT TISSUE at 13:21

## 2022-05-04 RX ADMIN — SUGAMMADEX 200 MG: 100 INJECTION, SOLUTION INTRAVENOUS at 13:58

## 2022-05-04 RX ADMIN — ONDANSETRON 4 MG: 2 INJECTION INTRAMUSCULAR; INTRAVENOUS at 13:21

## 2022-05-04 RX ADMIN — ROCURONIUM BROMIDE 50 MG: 10 INJECTION INTRAVENOUS at 13:05

## 2022-05-04 RX ADMIN — OXYCODONE HYDROCHLORIDE 5 MG: 5 TABLET ORAL at 15:37

## 2022-05-04 RX ADMIN — LIDOCAINE HYDROCHLORIDE 60 MG: 20 INJECTION, SOLUTION EPIDURAL; INFILTRATION; INTRACAUDAL; PERINEURAL at 13:05

## 2022-05-04 NOTE — OP NOTE
CHOLECYSTECTOMY LAPAROSCOPIC  Procedure Report    Patient Name:  Ely Coats  YOB: 1945    Date of Surgery:  5/4/2022     Indications: The patient is a 77-year-old female who had a recent admission for gallstone pancreatitis.  The decision was made to proceed with a laparoscopic cholecystectomy.    Pre-op Diagnosis: Gallstone pancreatitis     Post-Op Diagnosis: Same    Procedure/CPT® Codes:    Laparoscopic cholecystectomy    Staff:  Surgeon(s):  Josue Jernigan MD    Assistant: Addie Aragon CSA    Anesthesia: General    Estimated Blood Loss: minimal    Implants:    Implant Name Type Inv. Item Serial No.  Lot No. LRB No. Used Action   CLIPAPPLR M/ ENDO LIGACLIP ROT 10MM MD/WILY - [id number] Implant CLIPAPPLR M/ ENDO LIGACLIP ROT 10MM MD/WILY  [employer]  DIV OF J AND J [id number] N/A 1 Implanted       Specimen:          Specimens     ID Source Type Tests Collected By Collected At Frozen?    A Gallbladder Tissue · TISSUE PATHOLOGY EXAM   Josue Jernigan MD 5/4/22 7828     Description: gallbladder and contents              Findings: None    Complications: None    Description of Procedure: The patient was taken to the operating room and placed on the table in supine position.  After induction of general endotracheal anesthesia, the abdomen was prepped and draped sterilely.  The abdomen was insufflated with a Veress needle placed above the umbilicus and a 5 mm supraumbilical port was placed into the peritoneal cavity using a Visiport.  A 12 mm epigastric port and two 5 mm right flank ports were then placed under direct vision.  The dome of the gallbladder was grasped and retracted cephalad and the infundibulum was grasped and retracted inferiorly and laterally.  The gallbladder cystic duct junction was then dissected and a critical view of safety was obtained.  The camera was switched over to ICG mode and we clearly saw the cystic duct coming up into the infundibulum of the  gallbladder and we visualized the common bile duct medially.  The cystic duct was then clipped 3 times proximally and once distally and then divided with scissors.  The cystic artery was then dissected and clipped 3 times proximally and once distally and then also divided with scissors.  The gallbladder was then dissected free from the gallbladder fossa with cautery and brought out through the epigastric port site in an Endopouch bag.  The ports were replaced and the operative field was irrigated out with sterile saline.  We appear to have good hemostasis and I saw no evidence of a bile leak.  The epigastric port site was then closed with a Clement-Flower closure device and a 0 Mersilene tie.  The abdomen was desufflated and the other ports were removed.  The skin incisions were closed with 4-0 Vicryl subcuticular sutures.  Sterile dressings were applied and the patient was taken to the postanesthesia recovery room in stable condition.      Assistant: Addie Aragon CSA  was responsible for performing the following activities: Retraction, Suturing, Placing Dressing and Held/Positioned Camera and their skilled assistance was necessary for the success of this case.    Josue Jernigan MD     Date: 5/4/2022  Time: 13:48 EDT

## 2022-05-04 NOTE — DISCHARGE INSTRUCTIONS
DISCHARGE INSTRUCTIONS LAPAROSCOPIC CHOLECYSTECTOMY  (GALL BLADDER)      For your surgery you had:  General anesthesia (you may have a sore throat for the first 24 hours)  You received a medicated patch for nausea prevention today (behind your ear). It is recommended that you remove it 24-48 hours post-operatively. It must be removed within 72 hours.  You received an anesthesia medication today that can cause hormonal forms of birth control to be ineffective. You should use a different form of birth control (to prevent pregnancy) for 7 days.   You may experience dizziness, drowsiness, or light-headedness for several hours following surgery.  Do not stay alone tonight.  Limit your activity for 24 hours.  Resume your diet slowly.  Follow whatever special dietary instructions you may have been given by your doctor.  You should not drive, operate machinery, drink alcohol, or sign legally binding documents for 24 hours or while you are taking pain medication.      NOTIFY YOUR DOCTOR IF YOU EXPERIENCE ANY OF THE FOLLOWING:  Temperature greater than 101 degrees Fahrenheit  Shaking Chills  Redness or excessive drainage from incision  Nausea, vomiting and/or pain that is not controlled by prescribed medications  Increase in bleeding or bleeding that is excessive  Unable to urinate in 6 hours after surgery  If unable to reach your doctor, please go to the closest Emergency Room You may remove Band-Aid/dressing in 48 hours.   You may shower in 48 hours.  Apply an ice pack 24-48 hours.  You may experience gas discomfort 24-48 hours after discharge, especially in chest and shoulders.  Changing position frequently may alleviate this discomfort.  If you have excessive pain, swelling, redness, drainage or other problems, notify your physician.  If unable to urinate in 6 to 8 hours after surgery or urinating frequently in small amounts, notify your doctor or go to the nearest Emergency Room.  Medications per physician  instructions as indicated on Discharge Medication Information Sheet.    Last dose of pain medication was given at:   Oxy 5 mg X2 doses @ 2:48pm and 3:37pm    SPECIAL INSTRUCTIONS:

## 2022-05-04 NOTE — ANESTHESIA PREPROCEDURE EVALUATION
Anesthesia Evaluation     Patient summary reviewed and Nursing notes reviewed   no history of anesthetic complications:  NPO Solid Status: > 8 hours  NPO Liquid Status: > 2 hours           Airway   Mallampati: II  TM distance: >3 FB  Neck ROM: full  No difficulty expected  Dental    (+) implants    Pulmonary - negative pulmonary ROS and normal exam    breath sounds clear to auscultation  Cardiovascular - normal exam  Exercise tolerance: good (4-7 METS)    Rhythm: regular  Rate: normal    (+) hypertension, dysrhythmias Atrial Fib, hyperlipidemia,       Neuro/Psych- negative ROS  GI/Hepatic/Renal/Endo    (+)   renal disease,     Musculoskeletal (-) negative ROS    Abdominal    Substance History - negative use     OB/GYN negative ob/gyn ROS         Other - negative ROS                       Anesthesia Plan    ASA 2     general   (Patient understands anesthesia not responsible for dental damage.)  intravenous induction     Anesthetic plan, all risks, benefits, and alternatives have been provided, discussed and informed consent has been obtained with: patient.    Plan discussed with CRNA.        CODE STATUS:

## 2022-05-05 LAB
CYTO UR: NORMAL
LAB AP CASE REPORT: NORMAL
LAB AP CLINICAL INFORMATION: NORMAL
PATH REPORT.FINAL DX SPEC: NORMAL
PATH REPORT.GROSS SPEC: NORMAL

## 2022-05-06 ENCOUNTER — TELEPHONE (OUTPATIENT)
Dept: SURGERY | Facility: CLINIC | Age: 77
End: 2022-05-06

## 2022-05-06 NOTE — PRE-PROCEDURE INSTRUCTIONS
Pt instructed where to come to and nothing to eat or drink after midnight meds to take in the am  [facility]  and arrival time is 0600 AM on Tuesday 5/10/22 pt has been vaccinated X3 BLOOD SUGAR ORDERED

## 2022-05-06 NOTE — TELEPHONE ENCOUNTER
Provider: DR CASAREZ  Caller: VANDANA NEWSOME  Relationship to Patient: SELF    Phone Number: [phone]    Reason for Call: PT IS CALLING TO VERIFY IF IT IS OKAY FOR HER TO HAVE THE PROCEDURE CURRENTLY SCHEDULED FOR 5/10 W/ DR RAMIREZ. SHE WANTS TO MAKE SURE THIS IS NOT TOO CLOSE TO WHEN SHE HAD HER PROCEDURE W/ DR CASAREZ ON 5/4.    PT ADVISED SHE HAS SPOKEN W/ DR RAMIREZ'S OFFICE AND THEY ARE OKAY WITH THE PROCEDURE BEING SCHEDULED FOR 5/10 BUT THE PT WANTS TO VERIFY W/ DR CASAREZ ALSO.    PT CAN BE REACHED ANYTIME; OKAY TO LEAVE MESSAGE IF NO ANSWER.

## 2022-05-09 ENCOUNTER — TELEPHONE (OUTPATIENT)
Dept: SURGERY | Facility: CLINIC | Age: 77
End: 2022-05-09

## 2022-05-09 ENCOUNTER — OFFICE VISIT (OUTPATIENT)
Dept: SURGERY | Facility: CLINIC | Age: 77
End: 2022-05-09

## 2022-05-09 VITALS — BODY MASS INDEX: 27.85 KG/M2 | WEIGHT: 163.14 LBS | RESPIRATION RATE: 16 BRPM | HEIGHT: 64 IN

## 2022-05-09 DIAGNOSIS — T81.49XA WOUND INFECTION AFTER SURGERY: ICD-10-CM

## 2022-05-09 DIAGNOSIS — K80.13 CALCULUS OF GALLBLADDER WITH ACUTE ON CHRONIC CHOLECYSTITIS WITH OBSTRUCTION: Primary | ICD-10-CM

## 2022-05-09 PROCEDURE — 99024 POSTOP FOLLOW-UP VISIT: CPT | Performed by: SURGERY

## 2022-05-09 RX ORDER — SULFAMETHOXAZOLE AND TRIMETHOPRIM 800; 160 MG/1; MG/1
1 TABLET ORAL 2 TIMES DAILY
Qty: 14 TABLET | Refills: 0 | Status: SHIPPED | OUTPATIENT
Start: 2022-05-09 | End: 2022-05-16

## 2022-05-09 NOTE — TELEPHONE ENCOUNTER
Per Nae, I called the patient and scheduled an appointment for her to f/u with Dr. Jernigan today at 1:45.

## 2022-05-09 NOTE — TELEPHONE ENCOUNTER
"Charlie salazar 05/04/22.  Patient said the large bandage is off now.  She has a 3\" wide area with incision in center, that is painful, hot/warm to touch, and red.  Steri-strips still in place.  She has no fever.  Thinks it may be infected, and is asking for antibiotic.    "

## 2022-05-09 NOTE — PROGRESS NOTES
Chief Complaint  Pain    Subjective          Ely Coats presents to [facility]  History of Present Illness    Ely Coats is a 77 y.o. female  who presents today for a postoperative visit.     Patient is here for a follow-up after a laparoscopic cholecystectomy done 4 days ago for gallstone pancreatitis.  She came back to the office today noting redness and discomfort around the epigastric incision.    Past History:  Medical History: has a past medical history of Atrial fibrillation with RVR (HCC), Colon polyp, Contusion of face, Diabetes mellitus (HCC), Essential hypertension, Fall, Gallstones, Hyperlipidemia, Osteoarthritis, Osteopenia, Renal insufficiency, and Snoring.   Surgical History: has a past surgical history that includes Cardiac catheterization (Left, 2011); Colonoscopy (2014); Colonoscopy (2011); Tubal ligation; Skin cancer excision (04/2016); Cystoscopy (09/2016); ERCP (N/A, 04/20/2022); Wrist fracture surgery (Left); Blepharoplasty (Bilateral); and Cholecystectomy (N/A, 5/4/2022).   Family History: family history includes Diabetes in her brother; Heart disease in her mother; Lung disease in her mother; Stroke in her father.   Social History: reports that she has never smoked. She has never used smokeless tobacco. She reports that she does not drink alcohol and does not use drugs.  Allergies: Penicillins       Current Outpatient Medications:   •  apixaban (ELIQUIS) 5 MG tablet tablet, Take 5 mg by mouth 2 (Two) Times a Day. LAST DOSE 4/30/22, Disp: , Rfl:   •  carvedilol (COREG) 6.25 MG tablet, Take 0.5 tablets by mouth 2 (Two) Times a Day With Meals. (Patient taking differently: Take 6.25 mg by mouth 2 (Two) Times a Day With Meals.), Disp: , Rfl:   •  cholecalciferol (Cholecalciferol) 25 MCG (1000 UT) tablet, Take 1,000 Units by mouth Daily. INST PER ANESTHESIA PROTOCOL, Disp: , Rfl:   •  cyanocobalamin (VITAMIN B-12) 1000 MCG tablet, Take 500  "mcg by mouth Daily., Disp: , Rfl:   •  dilTIAZem LA (CARDIZEM LA) 240 MG 24 hr tablet, Take 240 mg by mouth Every Night., Disp: , Rfl:   •  furosemide (LASIX) 20 MG tablet, Take 20 mg by mouth Every Night., Disp: , Rfl:   •  glucose blood test strip, 1 strip by Other route Daily., Disp: , Rfl:   •  losartan (Cozaar) 25 MG tablet, Take 1 tablet by mouth Daily. (Patient taking differently: Take 25 mg by mouth Every Morning. INST PER ANESTHESIA PROTOCOL), Disp: 30 tablet, Rfl: 11  •  metFORMIN (GLUCOPHAGE) 500 MG tablet, Take 500 mg by mouth 2 (Two) Times a Day With Meals. INST PER ANESTHESIA PROTOCOL HOLD Monday NIGHT OR Tuesday AM, Disp: , Rfl:   •  Multiple Vitamin (MULTIVITAMIN) tablet, Take 1 tablet by mouth Daily., Disp: , Rfl:   •  PROBIOTIC PRODUCT PO, Take 1 tablet by mouth Daily., Disp: , Rfl:   •  rosuvastatin (CRESTOR) 10 MG tablet, Take 10 mg by mouth Every Night., Disp: , Rfl:   •  traMADol (ULTRAM) 50 MG tablet, Take 1 tablet by mouth Every 6 (Six) Hours As Needed for Moderate Pain ., Disp: 10 tablet, Rfl: 0       Physical Exam  She has obvious cellulitis around that epigastric incision.  I do not feel a fluctuant collection there.  Objective     Vital Signs:   Resp 16   Ht 162.6 cm (64.02\")   Wt 74 kg (163 lb 2.3 oz)   BMI 27.99 kg/m²              Assessment and Plan    Diagnoses and all orders for this visit:    1. Calculus of gallbladder with acute on chronic cholecystitis with obstruction (Primary)    2. Wound infection after surgery    We will go ahead and start some oral Bactrim.  I will see her Friday and make sure that that is responding appropriate.      "

## 2022-05-10 ENCOUNTER — TELEPHONE (OUTPATIENT)
Dept: SURGERY | Facility: CLINIC | Age: 77
End: 2022-05-10

## 2022-05-10 ENCOUNTER — HOSPITAL ENCOUNTER (OUTPATIENT)
Facility: HOSPITAL | Age: 77
Setting detail: HOSPITAL OUTPATIENT SURGERY
Discharge: HOME | End: 2022-05-10
Attending: INTERNAL MEDICINE | Admitting: INTERNAL MEDICINE

## 2022-05-10 ENCOUNTER — ANESTHESIA EVENT (OUTPATIENT)
Dept: GASTROENTEROLOGY | Facility: HOSPITAL | Age: 77
End: 2022-05-10

## 2022-05-10 ENCOUNTER — ANESTHESIA (OUTPATIENT)
Dept: GASTROENTEROLOGY | Facility: HOSPITAL | Age: 77
End: 2022-05-10

## 2022-05-10 ENCOUNTER — APPOINTMENT (OUTPATIENT)
Dept: GENERAL RADIOLOGY | Facility: HOSPITAL | Age: 77
End: 2022-05-10

## 2022-05-10 VITALS
DIASTOLIC BLOOD PRESSURE: 77 MMHG | WEIGHT: 157.85 LBS | HEIGHT: 64 IN | BODY MASS INDEX: 26.95 KG/M2 | OXYGEN SATURATION: 93 % | TEMPERATURE: 98.2 F | SYSTOLIC BLOOD PRESSURE: 145 MMHG | HEART RATE: 73 BPM | RESPIRATION RATE: 16 BRPM

## 2022-05-10 DIAGNOSIS — K80.13 CALCULUS OF GALLBLADDER WITH ACUTE ON CHRONIC CHOLECYSTITIS WITH OBSTRUCTION: Primary | ICD-10-CM

## 2022-05-10 DIAGNOSIS — K85.10 GALLSTONE PANCREATITIS: ICD-10-CM

## 2022-05-10 DIAGNOSIS — Z46.89 ENCOUNTER FOR REMOVAL OF BILIARY STENT: ICD-10-CM

## 2022-05-10 LAB — GLUCOSE BLDC GLUCOMTR-MCNC: 162 MG/DL (ref 70–99)

## 2022-05-10 PROCEDURE — 25010000002 PROPOFOL 10 MG/ML EMULSION: Performed by: NURSE ANESTHETIST, CERTIFIED REGISTERED

## 2022-05-10 PROCEDURE — 74330 X-RAY BILE/PANC ENDOSCOPY: CPT

## 2022-05-10 PROCEDURE — 43262 ENDO CHOLANGIOPANCREATOGRAPH: CPT | Performed by: INTERNAL MEDICINE

## 2022-05-10 PROCEDURE — 43275 ERCP REMOVE FORGN BODY DUCT: CPT | Performed by: INTERNAL MEDICINE

## 2022-05-10 PROCEDURE — 25010000002 ATROPINE PER 0.01 MG: Performed by: NURSE ANESTHETIST, CERTIFIED REGISTERED

## 2022-05-10 PROCEDURE — 43264 ERCP REMOVE DUCT CALCULI: CPT | Performed by: INTERNAL MEDICINE

## 2022-05-10 PROCEDURE — C1769 GUIDE WIRE: HCPCS | Performed by: INTERNAL MEDICINE

## 2022-05-10 PROCEDURE — 82962 GLUCOSE BLOOD TEST: CPT

## 2022-05-10 PROCEDURE — 25010000002 IOPAMIDOL 61 % SOLUTION: Performed by: INTERNAL MEDICINE

## 2022-05-10 PROCEDURE — 43273 ENDOSCOPIC PANCREATOSCOPY: CPT | Performed by: INTERNAL MEDICINE

## 2022-05-10 RX ORDER — SODIUM CHLORIDE, SODIUM LACTATE, POTASSIUM CHLORIDE, CALCIUM CHLORIDE 600; 310; 30; 20 MG/100ML; MG/100ML; MG/100ML; MG/100ML
30 INJECTION, SOLUTION INTRAVENOUS CONTINUOUS PRN
Status: DISCONTINUED | OUTPATIENT
Start: 2022-05-10 | End: 2022-05-10 | Stop reason: HOSPADM

## 2022-05-10 RX ORDER — SODIUM CHLORIDE, SODIUM LACTATE, POTASSIUM CHLORIDE, CALCIUM CHLORIDE 600; 310; 30; 20 MG/100ML; MG/100ML; MG/100ML; MG/100ML
30 INJECTION, SOLUTION INTRAVENOUS CONTINUOUS
Status: DISCONTINUED | OUTPATIENT
Start: 2022-05-10 | End: 2022-05-10 | Stop reason: HOSPADM

## 2022-05-10 RX ORDER — TRAMADOL HYDROCHLORIDE 50 MG/1
50 TABLET ORAL EVERY 6 HOURS PRN
Qty: 10 TABLET | Refills: 0 | Status: SHIPPED | OUTPATIENT
Start: 2022-05-10 | End: 2022-08-23

## 2022-05-10 RX ORDER — LIDOCAINE HYDROCHLORIDE 10 MG/ML
0.5 INJECTION, SOLUTION INFILTRATION; PERINEURAL ONCE AS NEEDED
Status: DISCONTINUED | OUTPATIENT
Start: 2022-05-10 | End: 2022-05-10 | Stop reason: HOSPADM

## 2022-05-10 RX ORDER — ATROPINE SULFATE 0.4 MG/ML
AMPUL (ML) INJECTION AS NEEDED
Status: DISCONTINUED | OUTPATIENT
Start: 2022-05-10 | End: 2022-05-10 | Stop reason: SURG

## 2022-05-10 RX ORDER — PHENYLEPHRINE HCL IN 0.9% NACL 1 MG/10 ML
SYRINGE (ML) INTRAVENOUS AS NEEDED
Status: DISCONTINUED | OUTPATIENT
Start: 2022-05-10 | End: 2022-05-10 | Stop reason: SURG

## 2022-05-10 RX ORDER — SODIUM CHLORIDE, SODIUM LACTATE, POTASSIUM CHLORIDE, CALCIUM CHLORIDE 600; 310; 30; 20 MG/100ML; MG/100ML; MG/100ML; MG/100ML
1000 INJECTION, SOLUTION INTRAVENOUS CONTINUOUS
Status: DISCONTINUED | OUTPATIENT
Start: 2022-05-10 | End: 2022-05-10 | Stop reason: HOSPADM

## 2022-05-10 RX ORDER — LIDOCAINE HYDROCHLORIDE 20 MG/ML
INJECTION, SOLUTION EPIDURAL; INFILTRATION; INTRACAUDAL; PERINEURAL AS NEEDED
Status: DISCONTINUED | OUTPATIENT
Start: 2022-05-10 | End: 2022-05-10 | Stop reason: SURG

## 2022-05-10 RX ORDER — PROPOFOL 10 MG/ML
VIAL (ML) INTRAVENOUS AS NEEDED
Status: DISCONTINUED | OUTPATIENT
Start: 2022-05-10 | End: 2022-05-10 | Stop reason: SURG

## 2022-05-10 RX ORDER — KETAMINE HCL IN NACL, ISO-OSM 100MG/10ML
SYRINGE (ML) INJECTION AS NEEDED
Status: DISCONTINUED | OUTPATIENT
Start: 2022-05-10 | End: 2022-05-10 | Stop reason: SURG

## 2022-05-10 RX ORDER — SODIUM CHLORIDE 0.9 % (FLUSH) 0.9 %
10 SYRINGE (ML) INJECTION AS NEEDED
Status: DISCONTINUED | OUTPATIENT
Start: 2022-05-10 | End: 2022-05-10 | Stop reason: HOSPADM

## 2022-05-10 RX ADMIN — Medication 100 MCG: at 07:40

## 2022-05-10 RX ADMIN — ATROPINE SULFATE 0.2 MG: 0.4 INJECTION, SOLUTION INTRAMUSCULAR; INTRAVENOUS; SUBCUTANEOUS at 07:44

## 2022-05-10 RX ADMIN — LIDOCAINE HYDROCHLORIDE 80 MG: 20 INJECTION, SOLUTION EPIDURAL; INFILTRATION; INTRACAUDAL; PERINEURAL at 07:30

## 2022-05-10 RX ADMIN — Medication 10 MG: at 07:37

## 2022-05-10 RX ADMIN — Medication 10 MG: at 07:30

## 2022-05-10 RX ADMIN — SODIUM CHLORIDE, POTASSIUM CHLORIDE, SODIUM LACTATE AND CALCIUM CHLORIDE: 600; 310; 30; 20 INJECTION, SOLUTION INTRAVENOUS at 07:27

## 2022-05-10 RX ADMIN — Medication 10 MG: at 07:39

## 2022-05-10 RX ADMIN — PROPOFOL 200 MCG/KG/MIN: 10 INJECTION, EMULSION INTRAVENOUS at 07:30

## 2022-05-10 RX ADMIN — PROPOFOL 30 MG: 10 INJECTION, EMULSION INTRAVENOUS at 07:30

## 2022-05-10 NOTE — ANESTHESIA POSTPROCEDURE EVALUATION
Patient: Ely Coats    Procedure Summary     Date: 05/10/22 Room / Location: [facility] ENDOSCOPY 4 / [facility] ENDOSCOPY    Anesthesia Start: 0727 Anesthesia Stop:     Procedure: ENDOSCOPIC RETROGRADE CHOLANGIOPANCREATOGRAPHY WITH STENT REMOVAL X2, SPHINCTEROTOMY, 12-15MM BALLOON SWEEP WITH BILE DUCT STONE REMOVAL (N/A ) Diagnosis:       Encounter for removal of biliary stent      (Encounter for removal of biliary stent [Z46.89])    Surgeons: Gurvinder Perry MD Provider: Lincoln Richardson MD    Anesthesia Type: general ASA Status: 3          Anesthesia Type: general    Vitals  Vitals Value Taken Time   /76 05/10/22 0814   Temp     Pulse 79 05/10/22 0817   Resp     SpO2 98 % 05/10/22 0817   Vitals shown include unvalidated device data.        Post Anesthesia Care and Evaluation    Patient location during evaluation: bedside  Patient participation: complete - patient participated  Level of consciousness: awake  Pain management: adequate  Airway patency: patent  Anesthetic complications: No anesthetic complications  PONV Status: none  Cardiovascular status: acceptable and stable  Respiratory status: acceptable  Hydration status: acceptable    Comments: An Anesthesiologist personally participated in the most demanding procedures (including induction and emergence if applicable) in the anesthesia plan, monitored the course of anesthesia administration at frequent intervals and remained physically present and available for immediate diagnosis and treatment of emergencies.

## 2022-05-10 NOTE — TELEPHONE ENCOUNTER
Patient said she has one pain pill and asked for refill.  Pharmacy verified.    She said she had ERCP with stent removal and stone removal today with Dr. Perry, and she wasn't prescribed pain medication. She said they told her not to take her Eliquis, ibuprofen, or Tylenol.

## 2022-05-10 NOTE — H&P
Pre Procedure History & Physical    Chief Complaint:   Bile duct stones    Subjective     HPI:   Common bile duct stone    Past Medical History:   Past Medical History:   Diagnosis Date   • Atrial fibrillation with RVR (HCC)     recurrent, FOLLOWED BY CHAYA NARANJO/CUAUHTEMOC LOPEZ   • Colon polyp    • Contusion of face    • Diabetes mellitus (HCC)    • Essential hypertension     CONT W/MEDS   • Fall    • Gallstones    • Hyperlipidemia    • Osteoarthritis    • Osteopenia    • Renal insufficiency     due to nsaids (8/2017 states labs wnl) NO CURRENT PROBLEMS   • Snoring     NO CPAP       Past Surgical History:  Past Surgical History:   Procedure Laterality Date   • BLEPHAROPLASTY Bilateral    • CARDIAC CATHETERIZATION Left 2011    [facility], Dr. Cooper Ramires, NO INTERVENTION   • CHOLECYSTECTOMY N/A 5/4/2022    Procedure: CHOLECYSTECTOMY LAPAROSCOPIC;  Surgeon: Josue Jernigan MD;  Location: [facility] MAIN OR;  Service: General;  Laterality: N/A;   • COLONOSCOPY  2014    negative   • COLONOSCOPY  2011    polyp-repeat 2014   • CYSTOSCOPY  09/2016    negative- Dr. Murphy per patient urethral polyp noted   • ERCP N/A 04/20/2022    Procedure: ENDOSCOPIC RETROGRADE CHOLANGIOPANCREATOGRAPHY WITH SPHINCTEROTOMY, BALLOON SWEEP, COMMON BILE DUCT STENT PLACED, PANCREATIC DUCT STENT PLACED;  Surgeon: Gurvinder Perry MD;  Location: [facility] ENDOSCOPY;  Service: Gastroenterology;  Laterality: N/A;  BILE DUCT STONES   • SKIN CANCER EXCISION  04/2016    basal cell on back   • TUBAL ABDOMINAL LIGATION     • WRIST FRACTURE SURGERY Left     ORIF       Family History:  Family History   Problem Relation Age of Onset   • Heart disease Mother         afib   • Lung disease Mother         interstitial lung disease   • Stroke Father         hemmorhagic stroke-aneurysm   • Diabetes Brother    • Malig Hyperthermia Neg Hx        Social History:   reports that she has never smoked. She has never used smokeless tobacco. She  "reports that she does not drink alcohol and does not use drugs.    Medications:   Medications Prior to Admission   Medication Sig Dispense Refill Last Dose   • carvedilol (COREG) 6.25 MG tablet Take 0.5 tablets by mouth 2 (Two) Times a Day With Meals. (Patient taking differently: Take 6.25 mg by mouth 2 (Two) Times a Day With Meals.)   5/10/2022 at 0530   • furosemide (LASIX) 20 MG tablet Take 20 mg by mouth Every Night.   5/9/2022 at Unknown time   • sulfamethoxazole-trimethoprim (Bactrim DS) 800-160 MG per tablet Take 1 tablet by mouth 2 (Two) Times a Day for 7 days. 14 tablet 0 5/9/2022 at Unknown time   • traMADol (ULTRAM) 50 MG tablet Take 1 tablet by mouth Every 6 (Six) Hours As Needed for Moderate Pain . 10 tablet 0 5/9/2022 at Unknown time   • apixaban (ELIQUIS) 5 MG tablet tablet Take 5 mg by mouth 2 (Two) Times a Day. LAST DOSE 4/30/22      • cholecalciferol (Cholecalciferol) 25 MCG (1000 UT) tablet Take 1,000 Units by mouth Daily. INST PER ANESTHESIA PROTOCOL      • cyanocobalamin (VITAMIN B-12) 1000 MCG tablet Take 500 mcg by mouth Daily.      • dilTIAZem LA (CARDIZEM LA) 240 MG 24 hr tablet Take 240 mg by mouth Every Night.      • glucose blood test strip 1 strip by Other route Daily.      • losartan (Cozaar) 25 MG tablet Take 1 tablet by mouth Daily. (Patient taking differently: Take 25 mg by mouth Every Morning. INST PER ANESTHESIA PROTOCOL) 30 tablet 11    • metFORMIN (GLUCOPHAGE) 500 MG tablet Take 500 mg by mouth 2 (Two) Times a Day With Meals. INST PER ANESTHESIA PROTOCOL HOLD Monday NIGHT OR Tuesday AM      • Multiple Vitamin (MULTIVITAMIN) tablet Take 1 tablet by mouth Daily.      • PROBIOTIC PRODUCT PO Take 1 tablet by mouth Daily.      • rosuvastatin (CRESTOR) 10 MG tablet Take 10 mg by mouth Every Night.          Allergies:  Penicillins        Objective     Blood pressure 119/73, pulse 72, temperature 98.3 °F (36.8 °C), temperature source Temporal, resp. rate 16, height 162.6 cm (64.02\"), " weight 71.6 kg (157 lb 13.6 oz), SpO2 94 %.    Physical Exam   Constitutional: Pt is oriented to person, place, and time and well-developed, well-nourished, and in no distress.   Mouth/Throat: Oropharynx is clear and moist.   Neck: Normal range of motion.   Cardiovascular: Normal rate, regular rhythm and normal heart sounds.    Pulmonary/Chest: Effort normal and breath sounds normal.   Abdominal: Soft. Nontender  Skin: Skin is warm and dry.   Psychiatric: Mood, memory, affect and judgment normal.     [unfilled]    Diagnosis:  Common bile duct stones    Anticipated Surgical Procedure:  ERCP    The risks, benefits, and alternatives of this procedure have been discussed with the patient or the responsible party- the patient understands and agrees to proceed.

## 2022-05-10 NOTE — ANESTHESIA POSTPROCEDURE EVALUATION
Patient: Ely Coats    Procedure Summary     Date: 05/10/22 Room / Location: [facility] ENDOSCOPY 4 / [facility] ENDOSCOPY    Anesthesia Start: 0727 Anesthesia Stop: 0823    Procedure: ENDOSCOPIC RETROGRADE CHOLANGIOPANCREATOGRAPHY WITH STENT REMOVAL X2, SPHINCTEROTOMY, 12-15MM BALLOON SWEEP WITH BILE DUCT STONE REMOVAL (N/A ) Diagnosis:       Encounter for removal of biliary stent      (Encounter for removal of biliary stent [Z46.89])    Surgeons: Gurvinder Perry MD Provider: Lincoln Richardson MD    Anesthesia Type: general ASA Status: 3          Anesthesia Type: general    Vitals  Vitals Value Taken Time   /79 05/10/22 0825   Temp 36.4 °C (97.6 °F) 05/10/22 0815   Pulse 65 05/10/22 0828   Resp 16 05/10/22 0825   SpO2 93 % 05/10/22 0828   Vitals shown include unvalidated device data.        Post Anesthesia Care and Evaluation    Patient location during evaluation: bedside  Patient participation: complete - patient participated  Level of consciousness: awake  Pain management: adequate  Airway patency: patent  Anesthetic complications: No anesthetic complications  PONV Status: none  Cardiovascular status: acceptable and stable  Respiratory status: acceptable  Hydration status: acceptable    Comments: An Anesthesiologist personally participated in the most demanding procedures (including induction and emergence if applicable) in the anesthesia plan, monitored the course of anesthesia administration at frequent intervals and remained physically present and available for immediate diagnosis and treatment of emergencies.

## 2022-05-10 NOTE — ANESTHESIA PREPROCEDURE EVALUATION
Anesthesia Evaluation     Patient summary reviewed and Nursing notes reviewed   no history of anesthetic complications:  NPO Solid Status: > 8 hours  NPO Liquid Status: > 2 hours           Airway   Mallampati: II  TM distance: >3 FB  Neck ROM: full  No difficulty expected  Dental    (+) implants    Pulmonary - negative pulmonary ROS and normal exam    breath sounds clear to auscultation  Cardiovascular - normal exam  Exercise tolerance: good (4-7 METS)    Rhythm: regular  Rate: normal    (+) hypertension, dysrhythmias Atrial Fib,       Neuro/Psych- negative ROS  GI/Hepatic/Renal/Endo    (+)   diabetes mellitus,     Musculoskeletal     Abdominal    Substance History - negative use     OB/GYN negative ob/gyn ROS         Other   arthritis,                      Anesthesia Plan    ASA 3     general       Anesthetic plan, all risks, benefits, and alternatives have been provided, discussed and informed consent has been obtained with: patient and other.        CODE STATUS:

## 2022-05-10 NOTE — NURSING NOTE
Patient presented in Recovery with a purple, raised blister on her lower lip.  CRNA pointed it out.  Blister is from placement of bite block.    Electronically signed by Viv Fermin RN, 05/10/22, 8:16 AM EDT.

## 2022-05-13 ENCOUNTER — OFFICE VISIT (OUTPATIENT)
Dept: SURGERY | Facility: CLINIC | Age: 77
End: 2022-05-13

## 2022-05-13 VITALS — HEIGHT: 64 IN | WEIGHT: 157.2 LBS | BODY MASS INDEX: 26.84 KG/M2

## 2022-05-13 DIAGNOSIS — T81.49XA WOUND INFECTION AFTER SURGERY: ICD-10-CM

## 2022-05-13 DIAGNOSIS — K80.13 CALCULUS OF GALLBLADDER WITH ACUTE ON CHRONIC CHOLECYSTITIS WITH OBSTRUCTION: Primary | ICD-10-CM

## 2022-05-13 PROCEDURE — 99024 POSTOP FOLLOW-UP VISIT: CPT | Performed by: SURGERY

## 2022-05-13 NOTE — PROGRESS NOTES
Chief Complaint  Post-op Follow-up (Janette)    Subjective          Ely Coats presents to [facility] GENERAL SURGERY  History of Present Illness    Ely Coats is a 77 y.o. female  who presents today for a postoperative visit.     Patient is here for a follow-up after a laparoscopic cholecystectomy.  She had an infection in her epigastric incision but is doing better since getting started on some antibiotics.    Past History:  Medical History: has a past medical history of Atrial fibrillation with RVR (HCC), Colon polyp, Contusion of face, Diabetes mellitus (HCC), Essential hypertension, Fall, Gallstones, Hyperlipidemia, Osteoarthritis, Osteopenia, Renal insufficiency, and Snoring.   Surgical History: has a past surgical history that includes Cardiac catheterization (Left, 2011); Colonoscopy (2014); Colonoscopy (2011); Tubal ligation; Skin cancer excision (04/2016); Cystoscopy (09/2016); ERCP (N/A, 04/20/2022); Wrist fracture surgery (Left); Blepharoplasty (Bilateral); Cholecystectomy (N/A, 5/4/2022); and ERCP (N/A, 5/10/2022).   Family History: family history includes Diabetes in her brother; Heart disease in her mother; Lung disease in her mother; Stroke in her father.   Social History: reports that she has never smoked. She has never used smokeless tobacco. She reports that she does not drink alcohol and does not use drugs.  Allergies: Penicillins       Current Outpatient Medications:   •  apixaban (ELIQUIS) 5 MG tablet tablet, Take 5 mg by mouth 2 (Two) Times a Day. LAST DOSE 4/30/22, Disp: , Rfl:   •  carvedilol (COREG) 6.25 MG tablet, Take 0.5 tablets by mouth 2 (Two) Times a Day With Meals. (Patient taking differently: Take 6.25 mg by mouth 2 (Two) Times a Day With Meals.), Disp: , Rfl:   •  cholecalciferol (Cholecalciferol) 25 MCG (1000 UT) tablet, Take 1,000 Units by mouth Daily. INST PER ANESTHESIA PROTOCOL, Disp: , Rfl:   •  cyanocobalamin (VITAMIN B-12) 1000  "MCG tablet, Take 500 mcg by mouth Daily., Disp: , Rfl:   •  dilTIAZem LA (CARDIZEM LA) 240 MG 24 hr tablet, Take 240 mg by mouth Every Night., Disp: , Rfl:   •  furosemide (LASIX) 20 MG tablet, Take 20 mg by mouth Every Night., Disp: , Rfl:   •  glucose blood test strip, 1 strip by Other route Daily., Disp: , Rfl:   •  losartan (Cozaar) 25 MG tablet, Take 1 tablet by mouth Daily. (Patient taking differently: Take 25 mg by mouth Every Morning. INST PER ANESTHESIA PROTOCOL), Disp: 30 tablet, Rfl: 11  •  metFORMIN (GLUCOPHAGE) 500 MG tablet, Take 500 mg by mouth 2 (Two) Times a Day With Meals. INST PER ANESTHESIA PROTOCOL HOLD Monday NIGHT OR Tuesday AM, Disp: , Rfl:   •  Multiple Vitamin (MULTIVITAMIN) tablet, Take 1 tablet by mouth Daily., Disp: , Rfl:   •  PROBIOTIC PRODUCT PO, Take 1 tablet by mouth Daily., Disp: , Rfl:   •  rosuvastatin (CRESTOR) 10 MG tablet, Take 10 mg by mouth Every Night., Disp: , Rfl:   •  sulfamethoxazole-trimethoprim (Bactrim DS) 800-160 MG per tablet, Take 1 tablet by mouth 2 (Two) Times a Day for 7 days., Disp: 14 tablet, Rfl: 0  •  traMADol (ULTRAM) 50 MG tablet, Take 1 tablet by mouth Every 6 (Six) Hours As Needed for Moderate Pain ., Disp: 10 tablet, Rfl: 0       Physical Exam  The epigastric incision looks better today the redness is improved  Objective     Vital Signs:   Ht 162.6 cm (64.02\")   Wt 71.3 kg (157 lb 3.2 oz)   BMI 26.97 kg/m²              Assessment and Plan    Diagnoses and all orders for this visit:    1. Calculus of gallbladder with acute on chronic cholecystitis with obstruction (Primary)    2. Wound infection after surgery    I will see her back next Friday for further follow-up.  I have asked her to call me in the meantime should she have any further problems.      "

## 2022-05-19 RX ORDER — LOSARTAN POTASSIUM 25 MG/1
TABLET ORAL
Qty: 30 TABLET | Refills: 0 | Status: SHIPPED | OUTPATIENT
Start: 2022-05-19 | End: 2022-05-25

## 2022-05-20 ENCOUNTER — OFFICE VISIT (OUTPATIENT)
Dept: SURGERY | Facility: CLINIC | Age: 77
End: 2022-05-20

## 2022-05-20 VITALS — HEIGHT: 64 IN | WEIGHT: 157.2 LBS | BODY MASS INDEX: 26.84 KG/M2 | RESPIRATION RATE: 14 BRPM

## 2022-05-20 DIAGNOSIS — T81.49XA WOUND INFECTION AFTER SURGERY: Primary | ICD-10-CM

## 2022-05-20 PROCEDURE — 99024 POSTOP FOLLOW-UP VISIT: CPT | Performed by: SURGERY

## 2022-05-20 RX ORDER — SULFAMETHOXAZOLE AND TRIMETHOPRIM 800; 160 MG/1; MG/1
1 TABLET ORAL 2 TIMES DAILY
Qty: 10 TABLET | Refills: 0 | Status: SHIPPED | OUTPATIENT
Start: 2022-05-20 | End: 2022-05-25

## 2022-05-20 NOTE — PROGRESS NOTES
Chief Complaint  Follow-up    Subjective          Ely Coats presents to [facility] GENERAL SURGERY  History of Present Illness    Ely Coats is a 77 y.o. female  who presents today for a postoperative visit.     Patient is here for a follow-up after a recent laparoscopic cholecystectomy.  Her epigastric incision is drained and she is doing better.    Past History:  Medical History: has a past medical history of Atrial fibrillation with RVR (HCC), Colon polyp, Contusion of face, Diabetes mellitus (HCC), Essential hypertension, Fall, Gallstones, Hyperlipidemia, Osteoarthritis, Osteopenia, Renal insufficiency, and Snoring.   Surgical History: has a past surgical history that includes Cardiac catheterization (Left, 2011); Colonoscopy (2014); Colonoscopy (2011); Tubal ligation; Skin cancer excision (04/2016); Cystoscopy (09/2016); ERCP (N/A, 04/20/2022); Wrist fracture surgery (Left); Blepharoplasty (Bilateral); Cholecystectomy (N/A, 5/4/2022); and ERCP (N/A, 5/10/2022).   Family History: family history includes Diabetes in her brother; Heart disease in her mother; Lung disease in her mother; Stroke in her father.   Social History: reports that she has never smoked. She has never used smokeless tobacco. She reports that she does not drink alcohol and does not use drugs.  Allergies: Penicillins       Current Outpatient Medications:   •  apixaban (ELIQUIS) 5 MG tablet tablet, Take 5 mg by mouth 2 (Two) Times a Day. LAST DOSE 4/30/22, Disp: , Rfl:   •  carvedilol (COREG) 6.25 MG tablet, Take 0.5 tablets by mouth 2 (Two) Times a Day With Meals. (Patient taking differently: Take 6.25 mg by mouth 2 (Two) Times a Day With Meals.), Disp: , Rfl:   •  cholecalciferol (Cholecalciferol) 25 MCG (1000 UT) tablet, Take 1,000 Units by mouth Daily. INST PER ANESTHESIA PROTOCOL, Disp: , Rfl:   •  cyanocobalamin (VITAMIN B-12) 1000 MCG tablet, Take 500 mcg by mouth Daily., Disp: , Rfl:   •   "dilTIAZem LA (CARDIZEM LA) 240 MG 24 hr tablet, Take 240 mg by mouth Every Night., Disp: , Rfl:   •  furosemide (LASIX) 20 MG tablet, Take 20 mg by mouth Every Night., Disp: , Rfl:   •  glucose blood test strip, 1 strip by Other route Daily., Disp: , Rfl:   •  losartan (COZAAR) 25 MG tablet, Take 1 tablet by mouth once daily, Disp: 30 tablet, Rfl: 0  •  metFORMIN (GLUCOPHAGE) 500 MG tablet, Take 500 mg by mouth 2 (Two) Times a Day With Meals. INST PER ANESTHESIA PROTOCOL HOLD Monday NIGHT OR Tuesday AM, Disp: , Rfl:   •  Multiple Vitamin (MULTIVITAMIN) tablet, Take 1 tablet by mouth Daily., Disp: , Rfl:   •  PROBIOTIC PRODUCT PO, Take 1 tablet by mouth Daily., Disp: , Rfl:   •  rosuvastatin (CRESTOR) 10 MG tablet, Take 10 mg by mouth Every Night., Disp: , Rfl:   •  traMADol (ULTRAM) 50 MG tablet, Take 1 tablet by mouth Every 6 (Six) Hours As Needed for Moderate Pain ., Disp: 10 tablet, Rfl: 0       Physical Exam  Epigastric incision looks much better today.  It is slightly open and its having a little bit of drainage there but that looks much better than it did even 2 weeks ago.  Objective     Vital Signs:   Resp 14   Ht 162.6 cm (64\")   Wt 71.3 kg (157 lb 3.2 oz)   BMI 26.98 kg/m²              Assessment and Plan    Diagnoses and all orders for this visit:    1. Wound infection after surgery (Primary)    Will put her on 5 more days of antibiotics and then I will see her back in 2 weeks.      "

## 2022-05-25 ENCOUNTER — TELEPHONE (OUTPATIENT)
Dept: GASTROENTEROLOGY | Facility: CLINIC | Age: 77
End: 2022-05-25

## 2022-05-25 RX ORDER — LOSARTAN POTASSIUM 25 MG/1
TABLET ORAL
Qty: 30 TABLET | Refills: 0 | Status: SHIPPED | OUTPATIENT
Start: 2022-05-25 | End: 2022-07-18

## 2022-05-26 NOTE — TELEPHONE ENCOUNTER
I called pt to inform of f/u prn, no answer but left detailed message and a cb number if having any GI symptoms.

## 2022-06-03 ENCOUNTER — OFFICE VISIT (OUTPATIENT)
Dept: SURGERY | Facility: CLINIC | Age: 77
End: 2022-06-03

## 2022-06-03 VITALS — BODY MASS INDEX: 27.1 KG/M2 | HEIGHT: 64 IN | RESPIRATION RATE: 14 BRPM | WEIGHT: 158.73 LBS

## 2022-06-03 DIAGNOSIS — T81.49XA WOUND INFECTION AFTER SURGERY: ICD-10-CM

## 2022-06-03 DIAGNOSIS — K80.13 CALCULUS OF GALLBLADDER WITH ACUTE ON CHRONIC CHOLECYSTITIS WITH OBSTRUCTION: Primary | ICD-10-CM

## 2022-06-03 PROCEDURE — 99024 POSTOP FOLLOW-UP VISIT: CPT | Performed by: SURGERY

## 2022-06-03 NOTE — PROGRESS NOTES
Chief Complaint  Abdominal Pain    Subjective          Ely Coats presents to [facility] GENERAL SURGERY  History of Present Illness    Ely Coats is a 77 y.o. female  who presents today for a postoperative visit.     Patient is here for a follow-up after after a laparoscopic cholecystectomy.  That surgery was complicated by an epigastric incisional wound infection.  She is doing better now.    Past History:  Medical History: has a past medical history of Atrial fibrillation with RVR (HCC), Colon polyp, Contusion of face, Diabetes mellitus (HCC), Essential hypertension, Fall, Gallstones, Hyperlipidemia, Osteoarthritis, Osteopenia, Renal insufficiency, and Snoring.   Surgical History: has a past surgical history that includes Cardiac catheterization (Left, 2011); Colonoscopy (2014); Colonoscopy (2011); Tubal ligation; Skin cancer excision (04/2016); Cystoscopy (09/2016); ERCP (N/A, 04/20/2022); Wrist fracture surgery (Left); Blepharoplasty (Bilateral); Cholecystectomy (N/A, 5/4/2022); and ERCP (N/A, 5/10/2022).   Family History: family history includes Diabetes in her brother; Heart disease in her mother; Lung disease in her mother; Stroke in her father.   Social History: reports that she has never smoked. She has never used smokeless tobacco. She reports that she does not drink alcohol and does not use drugs.  Allergies: Penicillins       Current Outpatient Medications:   •  mupirocin (BACTROBAN) 2 % ointment, Apply 1 application topically to the appropriate area as directed 3 (Three) Times a Day., Disp: , Rfl:   •  apixaban (ELIQUIS) 5 MG tablet tablet, Take 5 mg by mouth 2 (Two) Times a Day. LAST DOSE 4/30/22, Disp: , Rfl:   •  carvedilol (COREG) 6.25 MG tablet, Take 0.5 tablets by mouth 2 (Two) Times a Day With Meals. (Patient taking differently: Take 6.25 mg by mouth 2 (Two) Times a Day With Meals.), Disp: , Rfl:   •  cholecalciferol (Cholecalciferol) 25 MCG (1000 UT)  "tablet, Take 1,000 Units by mouth Daily. INST PER ANESTHESIA PROTOCOL, Disp: , Rfl:   •  cyanocobalamin (VITAMIN B-12) 1000 MCG tablet, Take 500 mcg by mouth Daily., Disp: , Rfl:   •  dilTIAZem LA (CARDIZEM LA) 240 MG 24 hr tablet, Take 240 mg by mouth Every Night., Disp: , Rfl:   •  furosemide (LASIX) 20 MG tablet, Take 20 mg by mouth Every Night., Disp: , Rfl:   •  glucose blood test strip, 1 strip by Other route Daily., Disp: , Rfl:   •  losartan (COZAAR) 25 MG tablet, Take 1 tablet by mouth once daily, Disp: 30 tablet, Rfl: 0  •  metFORMIN (GLUCOPHAGE) 500 MG tablet, Take 500 mg by mouth 2 (Two) Times a Day With Meals. INST PER ANESTHESIA PROTOCOL HOLD Monday NIGHT OR Tuesday AM, Disp: , Rfl:   •  Multiple Vitamin (MULTIVITAMIN) tablet, Take 1 tablet by mouth Daily., Disp: , Rfl:   •  mupirocin (BACTROBAN) 2 % ointment, APPLY OINTMENT TOPICALLY TO AFFECTED AREA THREE TIMES DAILY, Disp: , Rfl:   •  PROBIOTIC PRODUCT PO, Take 1 tablet by mouth Daily., Disp: , Rfl:   •  rosuvastatin (CRESTOR) 10 MG tablet, Take 10 mg by mouth Every Night., Disp: , Rfl:   •  traMADol (ULTRAM) 50 MG tablet, Take 1 tablet by mouth Every 6 (Six) Hours As Needed for Moderate Pain ., Disp: 10 tablet, Rfl: 0       Physical Exam  Her incision in the epigastric area has a small opening but overall it looks much better.  Otherwise her incisions look good.  Objective     Vital Signs:   Resp 14   Ht 162.6 cm (64.02\")   Wt 72 kg (158 lb 11.7 oz)   BMI 27.23 kg/m²              Assessment and Plan    Diagnoses and all orders for this visit:    1. Calculus of gallbladder with acute on chronic cholecystitis with obstruction (Primary)    2. Wound infection after surgery    I will see her back on an as-needed basis.  I have asked her to call me should she have any further problems.      "

## 2022-06-13 ENCOUNTER — OFFICE VISIT (OUTPATIENT)
Dept: ORTHOPEDIC SURGERY | Facility: CLINIC | Age: 77
End: 2022-06-13

## 2022-06-13 VITALS — HEIGHT: 64 IN | BODY MASS INDEX: 28.13 KG/M2 | HEART RATE: 59 BPM | OXYGEN SATURATION: 98 % | WEIGHT: 164.8 LBS

## 2022-06-13 DIAGNOSIS — M17.0 BILATERAL PRIMARY OSTEOARTHRITIS OF KNEE: Primary | ICD-10-CM

## 2022-06-13 DIAGNOSIS — M25.562 PAIN IN BOTH KNEES, UNSPECIFIED CHRONICITY: ICD-10-CM

## 2022-06-13 DIAGNOSIS — M25.561 PAIN IN BOTH KNEES, UNSPECIFIED CHRONICITY: ICD-10-CM

## 2022-06-13 PROCEDURE — 99213 OFFICE O/P EST LOW 20 MIN: CPT | Performed by: PHYSICIAN ASSISTANT

## 2022-06-13 PROCEDURE — 20610 DRAIN/INJ JOINT/BURSA W/O US: CPT | Performed by: PHYSICIAN ASSISTANT

## 2022-06-13 RX ORDER — TRIAMCINOLONE ACETONIDE 40 MG/ML
40 INJECTION, SUSPENSION INTRA-ARTICULAR; INTRAMUSCULAR
Status: COMPLETED | OUTPATIENT
Start: 2022-06-13 | End: 2022-06-13

## 2022-06-13 RX ORDER — LIDOCAINE HYDROCHLORIDE 10 MG/ML
9 INJECTION, SOLUTION INFILTRATION; PERINEURAL
Status: COMPLETED | OUTPATIENT
Start: 2022-06-13 | End: 2022-06-13

## 2022-06-13 RX ADMIN — TRIAMCINOLONE ACETONIDE 40 MG: 40 INJECTION, SUSPENSION INTRA-ARTICULAR; INTRAMUSCULAR at 08:37

## 2022-06-13 RX ADMIN — TRIAMCINOLONE ACETONIDE 40 MG: 40 INJECTION, SUSPENSION INTRA-ARTICULAR; INTRAMUSCULAR at 08:38

## 2022-06-13 RX ADMIN — LIDOCAINE HYDROCHLORIDE 9 ML: 10 INJECTION, SOLUTION INFILTRATION; PERINEURAL at 08:37

## 2022-06-13 RX ADMIN — LIDOCAINE HYDROCHLORIDE 9 ML: 10 INJECTION, SOLUTION INFILTRATION; PERINEURAL at 08:38

## 2022-06-13 NOTE — PROGRESS NOTES
"Chief Complaint  Pain and Follow-up of the Left Knee and Pain and Follow-up of the Right Knee    Subjective          Ely Coats is a 77 y.o. female  presents to [facility] for   History of Present Illness      Patient presents for follow-up evaluation of bilateral knee pain, bilateral knee osteoarthritis.  Patient states the right knee causes her more trouble than the left she has pain in the right knee more than left.  She also feels like the right knee is a little more \"weak\" than the left knee is.  She states the left knee is the main knee she uses to get up and down stairs she feels like the right knee could use some strengthening she feels both knees and legs could use strengthening and she has not done therapy in the past.  She has been treating her knee arthritis with injections which she states to help her knee pain.  She points anterior medial and lateral knees as her areas of pain.  She states she gets popping with certain movements.  She denies locking catching of the knees but has recently had some buckling of the right knee.  She denies recent injury.      Allergies   Allergen Reactions   • Penicillins Rash     Mild rash- pt states can take amoxicillin or keflex          Social History     Socioeconomic History   • Marital status:    Tobacco Use   • Smoking status: Never Smoker   • Smokeless tobacco: Never Used   Vaping Use   • Vaping Use: Never used   Substance and Sexual Activity   • Alcohol use: Never   • Drug use: Never   • Sexual activity: Defer        REVIEW OF SYSTEMS    Constitutional: Denies fevers, chills, weight loss  Cardiovascular: Denies chest pain, shortness of breath  Skin: Denies rashes, acute skin changes  Neurologic: Denies headache, loss of consciousness  MSK: Right and left knee pain      Objective   Vital Signs:   Pulse 59   Ht 162.6 cm (64\")   Wt 74.8 kg (164 lb 12.8 oz)   SpO2 98%   BMI 28.29 kg/m²     Body mass index is 28.29 " kg/m².    Physical Exam    Right knee: Crepitus with range of motion, skin is intact, no erythema, no ecchymosis, no swelling, no effusion, tender palpation of the lateral and medial anterior knee.  Mild valgus deformity, full extension, flexion 120, superficial veins to the leg, stable anterior/posterior drawer, stable to varus/valgus stress.    Left knee: Crepitus with range of motion, full extension, flexion 120, stable to varus/valgus stress, stable anterior/posterior drawer, mild tenderness palpation anterior medial knee.    Large Joint Arthrocentesis: R knee  Date/Time: 6/13/2022 8:37 AM  Consent given by: patient  Site marked: site marked  Timeout: Immediately prior to procedure a time out was called to verify the correct patient, procedure, equipment, support staff and site/side marked as required   Supporting Documentation  Indications: pain   Procedure Details  Location: knee - R knee  Preparation: Patient was prepped and draped in the usual sterile fashion  Needle gauge: 21 G.  Medications administered: 9 mL lidocaine 1 %; 40 mg triamcinolone acetonide 40 MG/ML      Large Joint Arthrocentesis: L knee  Date/Time: 6/13/2022 8:38 AM  Consent given by: patient  Site marked: site marked  Timeout: Immediately prior to procedure a time out was called to verify the correct patient, procedure, equipment, support staff and site/side marked as required   Supporting Documentation  Indications: pain   Procedure Details  Location: knee - L knee  Preparation: Patient was prepped and draped in the usual sterile fashion  Needle gauge: 21 g.  Medications administered: 9 mL lidocaine 1 %; 40 mg triamcinolone acetonide 40 MG/ML  Patient tolerance: patient tolerated the procedure well with no immediate complications          Imaging Results (Most Recent)     None           Result Review :   The following data was reviewed by: BROOKE Bryant on 06/13/2022:               Assessment and Plan    Diagnoses and all  orders for this visit:    1. Bilateral primary osteoarthritis of knee (Primary)  -     Ambulatory Referral to Physical Therapy Evaluate and treat (2-3x/week for 6-8 weeks)    2. Pain in both knees, unspecified chronicity  -     Ambulatory Referral to Physical Therapy Evaluate and treat (2-3x/week for 6-8 weeks)        Discussed diagnosis and treatment options with the patient patient was advised recommend starting physical therapy for strengthening of her bilateral lower extremities she agreed with this plan, she elected to have bilateral knee steroid injections which she tolerated well we will seek approval for bilateral Zilretta injections in the future from her insurance, follow-up in 3 months for recheck.  Follow-up sooner if any new or concerning symptoms occur, patient agreed.    Call or return if worsening symptoms.    Follow Up   Return in about 3 months (around 9/13/2022) for Recheck.  Patient was given instructions and counseling regarding her condition or for health maintenance advice. Please see specific information pulled into the AVS if appropriate.

## 2022-07-18 RX ORDER — LOSARTAN POTASSIUM 25 MG/1
TABLET ORAL
Qty: 30 TABLET | Refills: 0 | Status: SHIPPED | OUTPATIENT
Start: 2022-07-18 | End: 2022-07-25

## 2022-07-25 RX ORDER — LOSARTAN POTASSIUM 25 MG/1
TABLET ORAL
Qty: 30 TABLET | Refills: 6 | Status: SHIPPED | OUTPATIENT
Start: 2022-07-25 | End: 2023-02-14

## 2022-08-04 ENCOUNTER — TELEPHONE (OUTPATIENT)
Dept: ORTHOPEDIC SURGERY | Facility: CLINIC | Age: 77
End: 2022-08-04

## 2022-08-04 NOTE — TELEPHONE ENCOUNTER
Caller: Ely Dee    Relationship to patient: Self    Best call back number: [phone]    Patient is needing: CALLBACK; SOMEONE LEFT HER A MESSAGE ABOUT INSURANCE        UNABLE TO WARM TRANSFER

## 2022-08-23 ENCOUNTER — OFFICE VISIT (OUTPATIENT)
Dept: CARDIOLOGY | Facility: CLINIC | Age: 77
End: 2022-08-23

## 2022-08-23 VITALS
SYSTOLIC BLOOD PRESSURE: 122 MMHG | DIASTOLIC BLOOD PRESSURE: 80 MMHG | HEART RATE: 69 BPM | WEIGHT: 164.4 LBS | HEIGHT: 64 IN | OXYGEN SATURATION: 96 % | BODY MASS INDEX: 28.07 KG/M2

## 2022-08-23 DIAGNOSIS — I10 ESSENTIAL HYPERTENSION: Primary | ICD-10-CM

## 2022-08-23 DIAGNOSIS — I48.0 PAF (PAROXYSMAL ATRIAL FIBRILLATION): ICD-10-CM

## 2022-08-23 DIAGNOSIS — E78.2 MIXED HYPERLIPIDEMIA: ICD-10-CM

## 2022-08-23 PROCEDURE — 99214 OFFICE O/P EST MOD 30 MIN: CPT | Performed by: NURSE PRACTITIONER

## 2022-08-23 RX ORDER — CARVEDILOL 6.25 MG/1
1 TABLET ORAL 2 TIMES DAILY WITH MEALS
COMMUNITY
Start: 2022-07-18

## 2022-08-23 NOTE — PROGRESS NOTES
"    CARDIOLOGY        Patient Name: Ely Coats  :1945  Age: 77 y.o.  Primary Cardiologist: Yazan White MD  Encounter Provider:  CASTRO Isidro    Date of Service: 22            CHIEF COMPLAINT / REASON FOR OFFICE VISIT     Atrial Fibrillation      HISTORY OF PRESENT ILLNESS       HPI  Ely Coats is a 77 y.o. female who presents today for semiannual evaluation.     Pt has a  history significant for paroxysmal atrial fibrillation, hypertension.    Patient reports that she was normal for the past 6 months.  She states that she is attempting to lose weight and is walking and biking and trying to make dietary adjustments.  She states that she is not taking her statin any longer as she thinks it has been holding her life.  She has not informed her PCP of this.  Patient also states that she is only taking Eliquis once daily as she was having bruising.  Denies any hematuria, blood in stool, nosebleeds.  She does not report intermittent heart palpitations but are consistent with atrial they are not hallucinations.  Denies chest pain, dyspnea, lightheadedness, edema, fatigue.    The following portions of the patient's history were reviewed and updated as appropriate: allergies, current medications, past family history, past medical history, past social history, past surgical history and problem list.      VITAL SIGNS     Visit Vitals  /80 (BP Location: Left arm, Patient Position: Sitting, Cuff Size: Adult)   Pulse 69   Ht 162.6 cm (64\")   Wt 74.6 kg (164 lb 6.4 oz)   SpO2 96%   BMI 28.22 kg/m²         Wt Readings from Last 3 Encounters:   22 74.6 kg (164 lb 6.4 oz)   22 74.8 kg (164 lb 12.8 oz)   22 72 kg (158 lb 11.7 oz)     Body mass index is 28.22 kg/m².      REVIEW OF SYSTEMS   Review of Systems   Constitutional: Negative for chills, fever, weight gain and weight loss.   Cardiovascular: Positive for irregular heartbeat. Negative for leg swelling. "   Respiratory: Negative for cough, snoring and wheezing.    Hematologic/Lymphatic: Negative for bleeding problem. Does not bruise/bleed easily.   Skin: Negative for color change.   Musculoskeletal: Negative for falls, joint pain and myalgias.   Gastrointestinal: Negative for melena.   Genitourinary: Negative for hematuria.   Neurological: Negative for excessive daytime sleepiness.   Psychiatric/Behavioral: Negative for depression. The patient is not nervous/anxious.            PHYSICAL EXAMINATION     Vitals and nursing note reviewed.   Constitutional:       Appearance: Normal appearance. Well-developed.   Eyes:      Conjunctiva/sclera: Conjunctivae normal.   Neck:      Vascular: No carotid bruit.   Pulmonary:      Breath sounds: Normal breath sounds.   Cardiovascular:      Normal rate. Regular rhythm. Normal S1 with normal intensity. Normal S2 with normal intensity.      Murmurs: There is no murmur.      No gallop. No click. No rub.   Edema:     Peripheral edema absent.   Musculoskeletal: Normal range of motion. Skin:     General: Skin is warm and dry.   Neurological:      Mental Status: Alert and oriented to person, place, and time.      GCS: GCS eye subscore is 4. GCS verbal subscore is 5. GCS motor subscore is 6.   Psychiatric:         Speech: Speech normal.         Behavior: Behavior normal.         Thought Content: Thought content normal.         Judgment: Judgment normal.           REVIEWED DATA     Procedures    Cardiac Procedures:  1. Cardiac catheterization 5/17/2021.  Normal coronary arteries with torturous vessels.  2. Echocardiogram 7/17/2020.  LVEF 55%.  RV function normal.  No significant valvular disease.      BUN   Date Value Ref Range Status   05/24/2022 14 10 - 20 mg/dL Final     Creatinine   Date Value Ref Range Status   05/24/2022 0.98 0.55 - 1.02 mg/dL Final   02/16/2022 337.0 15 - 500 mg/dL Final     Potassium   Date Value Ref Range Status   05/24/2022 4.5 3.5 - 5.1 mmol/L Final     ALT (SGPT)    Date Value Ref Range Status   05/24/2022 13 10 - 35 U/L Final     AST (SGOT)   Date Value Ref Range Status   05/24/2022 17 10 - 35 U/L Final           ASSESSMENT & PLAN     Diagnoses and all orders for this visit:    1. Essential hypertension (Primary)  · BP controlled at 122/80  · Continue losartan 25 mg/day, carvedilol 6.25 mg twice per day, furosemide 20 mg/day    2. PAF (paroxysmal atrial fibrillation) (HCC)  · Patient with intermittent episodes of palpitations  · Continue diltiazem 240 mg/day, carvedilol 6.25 mg twice daily  · Patient currently only taking Eliquis once daily.  She states that she is having bruising to her arm.  Explained to patient that she does not qualify for low-dose dosing.  She is not having any hematuria or melena.  Stressed the importance of twice daily dosing for adequate stroke risk reduction.    3. Mixed hyperlipidemia  · Managed by PCP.  · Patient was placed on rosuvastatin 6 months ago for high cholesterol unfortunately she has self discontinued this medication secondary to hair loss.  Encourage patient to speak with her PCP before stopping any medications.          Return in about 6 months (around 2/23/2023) for Dr. Friend, transitioning from Dr. White.    Future Appointments       Provider Department Center    9/26/2022 10:00 AM Stephan Greco PA [facility]                MEDICATIONS         Discharge Medications          Accurate as of August 23, 2022  1:22 PM. If you have any questions, ask your nurse or doctor.            Changes to Medications      Instructions Start Date   carvedilol 6.25 MG tablet  Commonly known as: COREG  What changed: Another medication with the same name was removed. Continue taking this medication, and follow the directions you see here.  Changed by: CASTRO Isidro   1 tablet, Oral, 2 Times Daily With Meals         Continue These Medications      Instructions Start Date   apixaban 5 MG tablet  tablet  Commonly known as: ELIQUIS   5 mg, Oral, 2 Times Daily, LAST DOSE 4/30/22      cholecalciferol 25 MCG (1000 UT) tablet  Commonly known as: VITAMIN D3   1,000 Units, Oral, Daily, INST PER ANESTHESIA PROTOCOL      cyanocobalamin 1000 MCG tablet  Commonly known as: VITAMIN B-12   500 mcg, Oral, Daily      dilTIAZem  MG 24 hr tablet  Commonly known as: CARDIZEM LA   240 mg, Oral, Nightly      furosemide 20 MG tablet  Commonly known as: LASIX   20 mg, Oral, Nightly      glucose blood test strip   1 strip, Other, Daily      losartan 25 MG tablet  Commonly known as: COZAAR   Take 1 tablet by mouth once daily      metFORMIN 500 MG tablet  Commonly known as: GLUCOPHAGE   500 mg, Oral, 2 Times Daily With Meals, INST PER ANESTHESIA PROTOCOL HOLD Monday NIGHT OR Tuesday AM      multivitamin tablet tablet  Generic drug: multivitamin   1 tablet, Oral, Daily      mupirocin 2 % ointment  Commonly known as: BACTROBAN   APPLY OINTMENT TOPICALLY TO AFFECTED AREA THREE TIMES DAILY         Stop These Medications    PROBIOTIC PRODUCT PO  Stopped by: CASTRO Isidro     rosuvastatin 10 MG tablet  Commonly known as: CRESTOR  Stopped by: CASTRO Isidro     traMADol 50 MG tablet  Commonly known as: ULTRAM  Stopped by: CASTRO Isidro                **Dragon Disclaimer:   Much of this encounter note is an electronic transcription/translation of spoken language to printed text. The electronic translation of spoken language may permit erroneous, or at times, nonsensical words or phrases to be inadvertently transcribed. Although I have reviewed the note for such errors, some may still exist.

## 2022-09-26 ENCOUNTER — OFFICE VISIT (OUTPATIENT)
Dept: ORTHOPEDIC SURGERY | Facility: CLINIC | Age: 77
End: 2022-09-26

## 2022-09-26 VITALS — HEART RATE: 80 BPM | OXYGEN SATURATION: 95 % | WEIGHT: 169.4 LBS | HEIGHT: 64 IN | BODY MASS INDEX: 28.92 KG/M2

## 2022-09-26 DIAGNOSIS — M25.561 PAIN IN BOTH KNEES, UNSPECIFIED CHRONICITY: ICD-10-CM

## 2022-09-26 DIAGNOSIS — M17.0 BILATERAL PRIMARY OSTEOARTHRITIS OF KNEE: Primary | ICD-10-CM

## 2022-09-26 DIAGNOSIS — M25.562 PAIN IN BOTH KNEES, UNSPECIFIED CHRONICITY: ICD-10-CM

## 2022-09-26 PROCEDURE — 20610 DRAIN/INJ JOINT/BURSA W/O US: CPT | Performed by: PHYSICIAN ASSISTANT

## 2022-09-26 RX ORDER — LIDOCAINE HYDROCHLORIDE 10 MG/ML
5 INJECTION, SOLUTION INFILTRATION; PERINEURAL
Status: COMPLETED | OUTPATIENT
Start: 2022-09-26 | End: 2022-09-26

## 2022-09-26 RX ORDER — EZETIMIBE 10 MG/1
10 TABLET ORAL DAILY
COMMUNITY
Start: 2022-09-16

## 2022-09-26 RX ADMIN — LIDOCAINE HYDROCHLORIDE 5 ML: 10 INJECTION, SOLUTION INFILTRATION; PERINEURAL at 10:08

## 2022-09-26 RX ADMIN — LIDOCAINE HYDROCHLORIDE 5 ML: 10 INJECTION, SOLUTION INFILTRATION; PERINEURAL at 10:07

## 2022-09-26 NOTE — PROGRESS NOTES
"Chief Complaint  Pain and Follow-up of the Left Knee and Pain and Follow-up of the Right Knee    Subjective          Ely Coats is a 77 y.o. female  presents to [facility] for   History of Present Illness      Patient presents for follow-up evaluation of bilateral knee pain, bilateral knee osteoarthritis she states the right knee is worse than the left.  She points to the lateral knee of the right knee as her area of pain she states her knees cause her pain going up and down stairs or driving, she states positions cause her right knee to have worse pain.  She states walking she has no pain.  She was last seen on 6/13/2022 she had bilateral knee steroid injections which she states helped until about a month ago.  At last visit we discussed getting Zilretta injections approved and these were approved and she is here for this.  She denies new injury or denies new symptoms of pain.    Allergies   Allergen Reactions   • Statins Other (See Comments)     Hair loss crestor   • Penicillins Rash     Mild rash- pt states can take amoxicillin or keflex          Social History     Socioeconomic History   • Marital status:    Tobacco Use   • Smoking status: Never Smoker   • Smokeless tobacco: Never Used   Vaping Use   • Vaping Use: Never used   Substance and Sexual Activity   • Alcohol use: Never   • Drug use: Never   • Sexual activity: Defer        REVIEW OF SYSTEMS    Constitutional: Denies fevers, chills, weight loss  Cardiovascular: Denies chest pain, shortness of breath  Skin: Denies rashes, acute skin changes  Neurologic: Denies headache, loss of consciousness  MSK: Bilateral knee pain      Objective   Vital Signs:   Pulse 80   Ht 162.6 cm (64\")   Wt 76.8 kg (169 lb 6.4 oz)   SpO2 95%   BMI 29.08 kg/m²     Body mass index is 29.08 kg/m².    Physical Exam    Bilateral knees: Right knee: Crepitus with range of motion, skin is intact, no erythema, no ecchymosis, no swelling, " no effusion, no signs of infection, tender to palpation of the lateral and medial anterior knee, mild valgus deformity, full extension, flexion 120, superficial veins to leg, stable anterior/posterior drawer, stable to varus/valgus stress.  Left knee: Crepitus with range of motion, full extension, flexion 120, skin is intact, no erythema, no ecchymosis, no swelling, no signs of infection, stable to varus/valgus stress, stable anterior/posterior drawer.  Tenderness palpation anterior medial and lateral knee.    Large Joint Arthrocentesis: R knee  Date/Time: 9/26/2022 10:07 AM  Consent given by: patient  Site marked: site marked  Timeout: Immediately prior to procedure a time out was called to verify the correct patient, procedure, equipment, support staff and site/side marked as required   Supporting Documentation  Indications: pain   Procedure Details  Location: knee - R knee  Preparation: Patient was prepped and draped in the usual sterile fashion  Needle gauge: 21 G.  Approach: lateral  Medications administered: 32 mg Triamcinolone Acetonide 32 MG; 5 mL lidocaine 1 %  Patient tolerance: patient tolerated the procedure well with no immediate complications    Large Joint Arthrocentesis: L knee  Date/Time: 9/26/2022 10:08 AM  Consent given by: patient  Site marked: site marked  Timeout: Immediately prior to procedure a time out was called to verify the correct patient, procedure, equipment, support staff and site/side marked as required   Supporting Documentation  Indications: pain   Procedure Details  Location: knee - L knee  Preparation: Patient was prepped and draped in the usual sterile fashion  Needle gauge: 21 g.  Approach: lateral  Medications administered: 32 mg Triamcinolone Acetonide 32 MG; 5 mL lidocaine 1 %  Patient tolerance: patient tolerated the procedure well with no immediate complications          Imaging Results (Most Recent)     None           Result Review :   The following data was reviewed by:  BROOKE Bryant on 09/26/2022:               Assessment and Plan    Diagnoses and all orders for this visit:    1. Bilateral primary osteoarthritis of knee (Primary)    2. Pain in both knees, unspecified chronicity    Other orders  -     Large Joint Arthrocentesis: R knee  -     Large Joint Arthrocentesis: L knee        Patient elected to have bilateral knee Zilretta injections today which she tolerated well follow-up in 6 to 8 weeks for recheck follow-up sooner if any new or concerning symptoms occur, patient agreed    Call or return if worsening symptoms.    Follow Up   Return for Follow up 6-8 weeks.  Patient was given instructions and counseling regarding her condition or for health maintenance advice. Please see specific information pulled into the AVS if appropriate.

## 2023-02-07 NOTE — PROGRESS NOTES
RM:________     PCP: George Cox MD    : 1945  AGE: 78 y.o.  EST PATIENT   REASON FOR VISIT/  CC:    BP Readings from Last 3 Encounters:   22 122/80   05/10/22 145/77   22 138/55        WT: ____________ BP: __________L __________R HR______    CHEST PAIN: _____________    SOA: _____________PALPS: _______________     LIGHTHEADED: ___________FATIGUE: ________________ EDEMA __________    ALLERGIES:Statins and Penicillins SMOKING HISTORY:  Social History     Tobacco Use   • Smoking status: Never   • Smokeless tobacco: Never   Vaping Use   • Vaping Use: Never used   Substance Use Topics   • Alcohol use: Never   • Drug use: Never     CAFFEINE USE_________________  ALCOHOL ______________________    Below is the patient's most recent value for Albumin, ALT, AST, BUN, Calcium, Chloride, Cholesterol, CO2, Creatinine, GFR, Glucose, HDL, Hematocrit, Hemoglobin, Hemoglobin A1C, LDL, Magnesium, Phosphorus, Platelets, Potassium, PSA, Sodium, Triglycerides, TSH and WBC.   Lab Results   Component Value Date    ALBUMIN 3.9 2022    ALT 13 2022    AST 17 2022    BUN 14 2022    CALCIUM 8.6 2022    CL 99 2022    CO2 23 2022    CREATININE 0.98 2022    GLU 99 2022    HDL 57 10/06/2020    HCT 33.0 2022    HGB 11.7 (L) 2022    HGBA1C 6.7 (H) 2022     (H) 10/06/2020    MG 1.6 2022     (H) 2022    K 4.5 2022     2022    TRIG 167 (H) 10/06/2020    TSH 1.380 2022    WBC 7.44 2022          NEW DIAGNOSIS/ SURGERY/ HOSP OR ED VISITS: ______________________    __________________________________________________________________      RECENT LABS OR DIAGNOSTIC TESTING:  _____________________________    __________________________________________________________________      ASSESSMENT/ PLAN:  _______________________________________________    __________________________________________________________________

## 2023-02-10 ENCOUNTER — TELEPHONE (OUTPATIENT)
Dept: ORTHOPEDIC SURGERY | Facility: CLINIC | Age: 78
End: 2023-02-10

## 2023-02-10 ENCOUNTER — OFFICE VISIT (OUTPATIENT)
Dept: ORTHOPEDIC SURGERY | Facility: CLINIC | Age: 78
End: 2023-02-10
Payer: MEDICARE

## 2023-02-10 VITALS — WEIGHT: 169.31 LBS | HEART RATE: 76 BPM | OXYGEN SATURATION: 98 % | HEIGHT: 64 IN | BODY MASS INDEX: 28.91 KG/M2

## 2023-02-10 DIAGNOSIS — M25.561 PAIN IN BOTH KNEES, UNSPECIFIED CHRONICITY: ICD-10-CM

## 2023-02-10 DIAGNOSIS — M17.0 BILATERAL PRIMARY OSTEOARTHRITIS OF KNEE: Primary | ICD-10-CM

## 2023-02-10 DIAGNOSIS — M25.562 PAIN IN BOTH KNEES, UNSPECIFIED CHRONICITY: ICD-10-CM

## 2023-02-10 PROCEDURE — 20610 DRAIN/INJ JOINT/BURSA W/O US: CPT | Performed by: PHYSICIAN ASSISTANT

## 2023-02-10 PROCEDURE — 99213 OFFICE O/P EST LOW 20 MIN: CPT | Performed by: PHYSICIAN ASSISTANT

## 2023-02-10 RX ORDER — DILTIAZEM HYDROCHLORIDE 240 MG/1
CAPSULE, COATED, EXTENDED RELEASE ORAL DAILY
COMMUNITY
Start: 2023-01-11

## 2023-02-10 RX ORDER — LIDOCAINE HYDROCHLORIDE 10 MG/ML
5 INJECTION, SOLUTION INFILTRATION; PERINEURAL
Status: COMPLETED | OUTPATIENT
Start: 2023-02-10 | End: 2023-02-10

## 2023-02-10 RX ORDER — METHYLPREDNISOLONE ACETATE 80 MG/ML
80 INJECTION, SUSPENSION INTRA-ARTICULAR; INTRALESIONAL; INTRAMUSCULAR; SOFT TISSUE
Status: COMPLETED | OUTPATIENT
Start: 2023-02-10 | End: 2023-02-10

## 2023-02-10 RX ADMIN — LIDOCAINE HYDROCHLORIDE 5 ML: 10 INJECTION, SOLUTION INFILTRATION; PERINEURAL at 11:34

## 2023-02-10 RX ADMIN — METHYLPREDNISOLONE ACETATE 80 MG: 80 INJECTION, SUSPENSION INTRA-ARTICULAR; INTRALESIONAL; INTRAMUSCULAR; SOFT TISSUE at 11:34

## 2023-02-10 NOTE — PROGRESS NOTES
"Chief Complaint  Pain and Follow-up of the Left Knee and Pain and Follow-up of the Right Knee    Subjective          History of Present Illness      Ely Coats is a 78 y.o. female  presents to [facility] for     Patient presents for follow-up evaluation of bilateral knee pain, bilateral knee osteoarthritis.  Right knee is worse than the left.  She points to the lateral right knee as her area of worst pain she states she has pain going up and down stairs, driving, goes from sit to stand position.  She has no pain when she walks but states pain is worse when bending, flexing the knee.  She states the left knee is not as bad as the right she has had injections in the past for bilateral knees with some relief.  She states the right knee did not get as much relief from the last injection she received.  She states that she is considering right knee replacement but would also consider a viscosupplementation injections.  She denies new injury, denies new symptoms of pain.      Allergies   Allergen Reactions   • Statins Other (See Comments)     Hair loss crestor   • Penicillins Rash     Mild rash- pt states can take amoxicillin or keflex          Social History     Socioeconomic History   • Marital status:    Tobacco Use   • Smoking status: Never   • Smokeless tobacco: Never   Vaping Use   • Vaping Use: Never used   Substance and Sexual Activity   • Alcohol use: Never   • Drug use: Never   • Sexual activity: Defer        REVIEW OF SYSTEMS    Constitutional: Denies fevers, chills, weight loss  Cardiovascular: Denies chest pain, shortness of breath  Skin: Denies rashes, acute skin changes  Neurologic: Denies headache, loss of consciousness  MSK: Bilateral knee pain      Objective   Vital Signs:   Pulse 76   Ht 162.6 cm (64\")   Wt 76.8 kg (169 lb 5 oz)   SpO2 98%   BMI 29.06 kg/m²     Body mass index is 29.06 kg/m².    Physical Exam  Bilateral knees: Right knee: Crepitus with " range of motion, skin is intact, no erythema, no ecchymosis, no swelling, no effusion, no signs of infection, tender to palpation of the lateral and medial anterior knee, mild valgus deformity, full extension, flexion 120, superficial veins to leg, stable anterior/posterior drawer, stable to varus/valgus stress.  Left knee: Crepitus with range of motion, full extension, flexion 120, skin is intact, no erythema, no ecchymosis, no swelling, no signs of infection, stable to varus/valgus stress, stable anterior/posterior drawer.  Tenderness palpation anterior medial and lateral knee.    Large Joint Arthrocentesis: L knee  Date/Time: 2/10/2023 11:34 AM  Consent given by: patient  Site marked: site marked  Timeout: Immediately prior to procedure a time out was called to verify the correct patient, procedure, equipment, support staff and site/side marked as required   Supporting Documentation  Indications: pain   Procedure Details  Location: knee - L knee  Preparation: Patient was prepped and draped in the usual sterile fashion  Needle gauge: 21 g.  Approach: lateral  Medications administered: 5 mL lidocaine 1 %; 80 mg methylPREDNISolone acetate 80 MG/ML  Patient tolerance: patient tolerated the procedure well with no immediate complications          Imaging Results (Most Recent)     Procedure Component Value Units Date/Time    XR Knee 3 View Left [[id number]] Resulted: 02/10/23 1200     Updated: 02/10/23 1205    Narrative:      • View:AP/Lateral and [place] view(s)  • Site: Right knee, left knee  • Indication: Right knee pain, left knee pain  • Study: X-rays ordered, taken in the office, and reviewed today  • X-ray: Right knee: Advanced tricompartmental degenerative changes with   osteophyte formation, no fracture, no dislocation, no acute osseous   abnormality.  Left knee: Moderate tricompartmental degenerative changes   with osteophyte formation, no fracture, no dislocation, no acute osseous   abnormality  • Comparative  data: No comparative data found      XR Knee 3 View Right [[id number]] Resulted: 02/10/23 1200     Updated: 02/10/23 1205    Narrative:      • View:AP/Lateral and [place] view(s)  • Site: Right knee, left knee  • Indication: Right knee pain, left knee pain  • Study: X-rays ordered, taken in the office, and reviewed today  • X-ray: Right knee: Advanced tricompartmental degenerative changes with   osteophyte formation, no fracture, no dislocation, no acute osseous   abnormality.  Left knee: Moderate tricompartmental degenerative changes   with osteophyte formation, no fracture, no dislocation, no acute osseous   abnormality  • Comparative data: No comparative data found             Result Review :   The following data was reviewed by: BROOKE Bryant on 02/10/2023:  Data reviewed: Radiologic studies Reviewed by me with the patient             Assessment and Plan    Diagnoses and all orders for this visit:    1. Bilateral primary osteoarthritis of knee (Primary)  -     XR Knee 3 View Right  -     XR Knee 3 View Left    2. Pain in both knees, unspecified chronicity    Other orders  -     Large Joint Arthrocentesis: L knee        Reviewed x-rays with the patient discussed diagnosis and treatment options with the patient, she is considering right knee replacement she talked this over with Dr. Gann, risk benefits procedure and recovery were discussed, patient states she has a recent history of staph infection due to a abdominal surgery she had about 6 months ago.  Patient also has a history of A-fib, is on a blood thinner.  We discussed holding off on surgery discussion for now patient was advised we will discuss/seek approval for bilateral knee Synvisc injections, patient agreed to this plan follow-up when injections are approved.  She did agree to have a left knee steroid injection today which she tolerated well    Call or return if worsening symptoms.    Follow Up   Return for WHEN INJECTION IS  APPROVED.  Patient was given instructions and counseling regarding her condition or for health maintenance advice. Please see specific information pulled into the AVS if appropriate.

## 2023-02-14 RX ORDER — LOSARTAN POTASSIUM 25 MG/1
TABLET ORAL
Qty: 90 TABLET | Refills: 0 | Status: SHIPPED | OUTPATIENT
Start: 2023-02-14 | End: 2023-02-17 | Stop reason: SDUPTHER

## 2023-02-17 ENCOUNTER — OFFICE VISIT (OUTPATIENT)
Dept: ORTHOPEDIC SURGERY | Facility: CLINIC | Age: 78
End: 2023-02-17
Payer: MEDICARE

## 2023-02-17 VITALS — HEIGHT: 64 IN | HEART RATE: 61 BPM | WEIGHT: 168.8 LBS | BODY MASS INDEX: 28.82 KG/M2 | OXYGEN SATURATION: 96 %

## 2023-02-17 DIAGNOSIS — M25.551 RIGHT HIP PAIN: Primary | ICD-10-CM

## 2023-02-17 DIAGNOSIS — M70.61 GREATER TROCHANTERIC BURSITIS OF RIGHT HIP: ICD-10-CM

## 2023-02-17 PROCEDURE — 99213 OFFICE O/P EST LOW 20 MIN: CPT | Performed by: PHYSICIAN ASSISTANT

## 2023-02-17 PROCEDURE — 20610 DRAIN/INJ JOINT/BURSA W/O US: CPT | Performed by: PHYSICIAN ASSISTANT

## 2023-02-17 RX ORDER — LOSARTAN POTASSIUM 25 MG/1
25 TABLET ORAL DAILY
Qty: 90 TABLET | Refills: 2 | Status: SHIPPED | OUTPATIENT
Start: 2023-02-17

## 2023-02-17 RX ORDER — METHYLPREDNISOLONE ACETATE 80 MG/ML
80 INJECTION, SUSPENSION INTRA-ARTICULAR; INTRALESIONAL; INTRAMUSCULAR; SOFT TISSUE
Status: COMPLETED | OUTPATIENT
Start: 2023-02-17 | End: 2023-02-17

## 2023-02-17 RX ORDER — LIDOCAINE HYDROCHLORIDE 10 MG/ML
5 INJECTION, SOLUTION INFILTRATION; PERINEURAL
Status: COMPLETED | OUTPATIENT
Start: 2023-02-17 | End: 2023-02-17

## 2023-02-17 RX ADMIN — LIDOCAINE HYDROCHLORIDE 5 ML: 10 INJECTION, SOLUTION INFILTRATION; PERINEURAL at 11:21

## 2023-02-17 RX ADMIN — METHYLPREDNISOLONE ACETATE 80 MG: 80 INJECTION, SUSPENSION INTRA-ARTICULAR; INTRALESIONAL; INTRAMUSCULAR; SOFT TISSUE at 11:21

## 2023-02-17 NOTE — TELEPHONE ENCOUNTER
Fax refill request for Losartan 25 mg 1 qd  #90 to [facility] .   Ml

## 2023-02-17 NOTE — PROGRESS NOTES
"Chief Complaint  Initial Evaluation and Pain of the Right Hip    Subjective          History of Present Illness      Ely Coats is a 78 y.o. female  presents to [facility] for     Patient presents for evaluation of right hip pain.  She points to the lateral hip as her area of pain.  She denies injury or trauma.  Patient states she has a history of hip bursitis she states 20 years ago she had similar symptoms she had an injection and it helped resolve her problem.  Patient is very active she likes to exercise, she walks for exercise and states when she walks she has pain in the lateral hip.  She uses a bicycle and this does not cause the pain.  She denies groin pain, denies locking catching buckling.  She states she has pain at night, laying/rolling on her side hurts.      Allergies   Allergen Reactions   • Statins Other (See Comments)     Hair loss crestor   • Penicillins Rash     Mild rash- pt states can take amoxicillin or keflex          Social History     Socioeconomic History   • Marital status:    Tobacco Use   • Smoking status: Never   • Smokeless tobacco: Never   Vaping Use   • Vaping Use: Never used   Substance and Sexual Activity   • Alcohol use: Never   • Drug use: Never   • Sexual activity: Defer        REVIEW OF SYSTEMS    Constitutional: Denies fevers, chills, weight loss  Cardiovascular: Denies chest pain, shortness of breath  Skin: Denies rashes, acute skin changes  Neurologic: Denies headache, loss of consciousness  MSK: Right hip pain      Objective   Vital Signs:   Pulse 61   Ht 162.6 cm (64\")   Wt 76.6 kg (168 lb 12.8 oz)   SpO2 96%   BMI 28.97 kg/m²     Body mass index is 28.97 kg/m².    Physical Exam    Right hip: Tender palpation of lateral hip over the greater trochanter, active flexion, flexion 120, no pain with rotation, negative straight leg raise, neurovascular intact.    Large Joint Arthrocentesis: R greater trochanteric " bursa  Date/Time: 2/17/2023 11:21 AM  Consent given by: patient  Site marked: site marked  Timeout: Immediately prior to procedure a time out was called to verify the correct patient, procedure, equipment, support staff and site/side marked as required   Supporting Documentation  Indications: pain   Procedure Details  Location: hip - R greater trochanteric bursa  Preparation: Patient was prepped and draped in the usual sterile fashion  Needle size: 22 G  Approach: lateral  Medications administered: 5 mL lidocaine 1 %; 80 mg methylPREDNISolone acetate 80 MG/ML  Patient tolerance: patient tolerated the procedure well with no immediate complications          Imaging Results (Most Recent)     Procedure Component Value Units Date/Time    XR Hip With or Without Pelvis 2 - 3 View Right [[id number]] Resulted: 02/17/23 1227     Updated: 02/17/23 1229    Narrative:      • View:AP/Lateral view(s)  • Site: Right hip  • Indication: Right hip pain  • Study: X-rays ordered, taken in the office, and reviewed today  • X-ray: No fracture, no dislocation, no acute osseous abnormalities, mild   to moderate degenerative changes of the femoral acetabular joint,   osteophyte/calcification to greater trochanter.  Comparative data: No   comparative data found             Result Review :   The following data was reviewed by: BROOKE Bryatn on 02/17/2023:  Data reviewed: Radiologic studies Reviewed by me with the patient             Assessment and Plan    Diagnoses and all orders for this visit:    1. Right hip pain (Primary)  -     XR Hip With or Without Pelvis 2 - 3 View Right  -     Ambulatory Referral to Physical Therapy Evaluate and treat (2-3X/WEEK FOR 6-8 WEEKS)    2. Greater trochanteric bursitis of right hip  -     Ambulatory Referral to Physical Therapy Evaluate and treat (2-3X/WEEK FOR 6-8 WEEKS)    Other orders  -     Large Joint Arthrocentesis: R greater trochanteric bursa        Reviewed x-rays with the patient  discussed diagnosis and treatment options with her we discussed conservative treatment with right hip steroid injection and starting physical therapy, patient agreed to have injection which she tolerated well she was given order for therapy, follow-up in 6 weeks for recheck.  May consider MRI if no improvement    Call or return if worsening symptoms.    Follow Up   Return in about 6 weeks (around 3/31/2023) for Recheck.  Patient was given instructions and counseling regarding her condition or for health maintenance advice. Please see specific information pulled into the AVS if appropriate.

## 2023-02-20 ENCOUNTER — TELEPHONE (OUTPATIENT)
Dept: ORTHOPEDIC SURGERY | Facility: CLINIC | Age: 78
End: 2023-02-20
Payer: MEDICARE

## 2023-02-23 ENCOUNTER — OFFICE VISIT (OUTPATIENT)
Dept: CARDIOLOGY | Facility: CLINIC | Age: 78
End: 2023-02-23
Payer: MEDICARE

## 2023-02-23 VITALS
DIASTOLIC BLOOD PRESSURE: 66 MMHG | BODY MASS INDEX: 29.47 KG/M2 | HEART RATE: 55 BPM | HEIGHT: 64 IN | WEIGHT: 172.6 LBS | SYSTOLIC BLOOD PRESSURE: 114 MMHG

## 2023-02-23 DIAGNOSIS — I48.0 PAROXYSMAL ATRIAL FIBRILLATION: Primary | Chronic | ICD-10-CM

## 2023-02-23 DIAGNOSIS — I10 ESSENTIAL HYPERTENSION: Chronic | ICD-10-CM

## 2023-02-23 DIAGNOSIS — R00.2 PALPITATIONS: ICD-10-CM

## 2023-02-23 PROBLEM — Z46.89 ENCOUNTER FOR REMOVAL OF BILIARY STENT: Status: RESOLVED | Noted: 2022-04-25 | Resolved: 2023-02-23

## 2023-02-23 PROBLEM — K80.13 CALCULUS OF GALLBLADDER WITH ACUTE ON CHRONIC CHOLECYSTITIS WITH OBSTRUCTION: Status: RESOLVED | Noted: 2022-05-09 | Resolved: 2023-02-23

## 2023-02-23 PROBLEM — T81.49XA WOUND INFECTION AFTER SURGERY: Status: RESOLVED | Noted: 2022-05-09 | Resolved: 2023-02-23

## 2023-02-23 PROCEDURE — 99214 OFFICE O/P EST MOD 30 MIN: CPT | Performed by: INTERNAL MEDICINE

## 2023-02-23 PROCEDURE — 93000 ELECTROCARDIOGRAM COMPLETE: CPT | Performed by: INTERNAL MEDICINE

## 2023-02-23 NOTE — PROGRESS NOTES
Date of Office Visit: 23  Encounter Provider: Venkatesh Friend MD  Place of Service: [facility]  Patient Name: Ely Coats  :1945    Chief Complaint   Patient presents with   • Atrial Fibrillation   :     HPI:     Ms. Lizzette Coats is 78 y.o. and presents today to establish care. I have reviewed prior notes and there are no changes except for any new updates described below. I have also reviewed any information entered into the medical record by the patient or by ancillary staff.     She has a history of paroxysmal atrial fibrillation and is on carvedilol, diltiazem, and apixaban. She has hypertension and takes losartan. She has not had any sustained atrial fibrillation in a long time, but she does occasionally feel palpitations at night, and she can palpate a skipped beat. She has occasional lower extremity edema but denies dyspnea, orthopnea, or PND. She denies lightheadedness or syncope. She denies chest pain. She denies major bleeding.     Past Medical History:   Diagnosis Date   • Atrial fibrillation with RVR (HCC)     recurrent, FOLLOWED BY CHAYA NARANJO/CUAUHTEMOC LOPEZ   • Calculus of gallbladder with acute on chronic cholecystitis with obstruction 2022   • Colon polyp    • Contusion of face    • Diabetes mellitus (HCC)    • Essential hypertension     CONT W/MEDS   • Fall    • Gallstones    • Hyperlipidemia    • Osteoarthritis    • Osteopenia    • Renal insufficiency     due to nsaids (2017 states labs wnl) NO CURRENT PROBLEMS   • Snoring     NO CPAP   • Wound infection after surgery 2022       Past Surgical History:   Procedure Laterality Date   • BLEPHAROPLASTY Bilateral    • CARDIAC CATHETERIZATION Left     [facility], Dr. Cooper Ramires, NO INTERVENTION   • CHOLECYSTECTOMY N/A 2022    Procedure: CHOLECYSTECTOMY LAPAROSCOPIC;  Surgeon: Josue Jernigan MD;  Location: [facility] MAIN OR;  Service: General;  Laterality:  N/A;   • COLONOSCOPY  2014    negative   • COLONOSCOPY  2011    polyp-repeat 2014   • CYSTOSCOPY  09/2016    negative- Dr. Murphy per patient urethral polyp noted   • ERCP N/A 04/20/2022    Procedure: ENDOSCOPIC RETROGRADE CHOLANGIOPANCREATOGRAPHY WITH SPHINCTEROTOMY, BALLOON SWEEP, COMMON BILE DUCT STENT PLACED, PANCREATIC DUCT STENT PLACED;  Surgeon: Gurvinder Perry MD;  Location: [facility] ENDOSCOPY;  Service: Gastroenterology;  Laterality: N/A;  BILE DUCT STONES   • ERCP N/A 5/10/2022    Procedure: ENDOSCOPIC RETROGRADE CHOLANGIOPANCREATOGRAPHY WITH STENT REMOVAL X2, SPHINCTEROTOMY, 12-15MM BALLOON SWEEP WITH BILE DUCT STONE REMOVAL;  Surgeon: Gurvinder Perry MD;  Location: [facility] ENDOSCOPY;  Service: Gastroenterology;  Laterality: N/A;  BILE DUCT STONES   • SKIN CANCER EXCISION  04/2016    basal cell on back   • TUBAL ABDOMINAL LIGATION     • WRIST FRACTURE SURGERY Left     ORIF       Social History     Socioeconomic History   • Marital status:    Tobacco Use   • Smoking status: Never   • Smokeless tobacco: Never   Vaping Use   • Vaping Use: Never used   Substance and Sexual Activity   • Alcohol use: Never   • Drug use: Never   • Sexual activity: Defer       Family History   Problem Relation Age of Onset   • Heart disease Mother         afib   • Lung disease Mother         interstitial lung disease   • Stroke Father         hemmorhagic stroke-aneurysm   • Diabetes Brother    • Malig Hyperthermia Neg Hx        Review of Systems   Cardiovascular: Positive for leg swelling and palpitations.   Musculoskeletal: Positive for arthritis.   All other systems reviewed and are negative.      Allergies   Allergen Reactions   • Statins Other (See Comments)     Hair loss crestor   • Penicillins Rash     Mild rash- pt states can take amoxicillin or keflex           Current Outpatient Medications:   •  apixaban (ELIQUIS) 5 MG tablet tablet, Take 5 mg by mouth 2 (Two) Times a Day. LAST DOSE 4/30/22, Disp: , Rfl:  "  •  carvedilol (COREG) 6.25 MG tablet, Take 1 tablet by mouth 2 (Two) Times a Day With Meals., Disp: , Rfl:   •  cholecalciferol (Cholecalciferol) 25 MCG (1000 UT) tablet, Take 1,000 Units by mouth Daily. INST PER ANESTHESIA PROTOCOL, Disp: , Rfl:   •  cyanocobalamin (VITAMIN B-12) 1000 MCG tablet, Take 500 mcg by mouth Daily., Disp: , Rfl:   •  dilTIAZem CD (CARDIZEM CD) 240 MG 24 hr capsule, Daily., Disp: , Rfl:   •  ezetimibe (ZETIA) 10 MG tablet, Take 10 mg by mouth Daily., Disp: , Rfl:   •  losartan (COZAAR) 25 MG tablet, Take 1 tablet by mouth Daily., Disp: 90 tablet, Rfl: 2  •  metFORMIN (GLUCOPHAGE) 500 MG tablet, Take 500 mg by mouth 2 (Two) Times a Day With Meals. INST PER ANESTHESIA PROTOCOL HOLD Monday NIGHT OR Tuesday AM, Disp: , Rfl:   •  Multiple Vitamin (MULTIVITAMIN) tablet, Take 1 tablet by mouth Daily., Disp: , Rfl:   •  mupirocin (BACTROBAN) 2 % ointment, APPLY OINTMENT TOPICALLY TO AFFECTED AREA THREE TIMES DAILY, Disp: , Rfl:   •  furosemide (LASIX) 20 MG tablet, Take 20 mg by mouth Daily., Disp: , Rfl:       Objective:     Vitals:    02/23/23 1123   BP: 114/66   BP Location: Right arm   Pulse: 55   Weight: 78.3 kg (172 lb 9.6 oz)   Height: 162.6 cm (64\")     Body mass index is 29.63 kg/m².    Vitals reviewed.   Constitutional:       Appearance: Healthy appearance. Well-developed and not in distress.   Eyes:      Conjunctiva/sclera: Conjunctivae normal.   HENT:      Head: Normocephalic.      Nose: Nose normal.         Comments: masked  Neck:      Vascular: No JVD. JVD normal.      Lymphadenopathy: No cervical adenopathy.   Pulmonary:      Effort: Pulmonary effort is normal.      Breath sounds: Normal breath sounds.   Cardiovascular:      Normal rate. Regular rhythm.      Murmurs: There is no murmur.   Pulses:     Intact distal pulses.   Edema:     Peripheral edema absent.   Abdominal:      Palpations: Abdomen is soft.      Tenderness: There is no abdominal tenderness.   Musculoskeletal: Normal " range of motion.      Cervical back: Normal range of motion. Skin:     General: Skin is warm and dry.      Findings: No rash.   Neurological:      General: No focal deficit present.      Mental Status: Alert and oriented to person, place, and time.      Cranial Nerves: No cranial nerve deficit.   Psychiatric:         Behavior: Behavior normal.         Thought Content: Thought content normal.         Judgment: Judgment normal.           ECG 12 Lead    Date/Time: 2/23/2023 1:18 PM  Performed by: Venkatesh Friend MD  Authorized by: Venkatesh Friend MD   Comparison: compared with previous ECG   Similar to previous ECG  Rhythm: sinus rhythm  Conduction: conduction normal  ST Segments: ST segments normal  T Waves: T waves normal  QRS axis: left  Other findings: poor R wave progression    Clinical impression: non-specific ECG              Assessment:       Diagnosis Plan   1. Paroxysmal atrial fibrillation (HCC)        2. Palpitations        3. Essential hypertension               Plan:       1.  She has a history of highly symptomatic paroxysmal atrial fibrillation.  She is on carvedilol, diltiazem, and apixaban.  Her [id number] score is 5.  She does report occasional lower extremity edema, and her heart rate is a little on the low side in sinus rhythm.  I do think that we are going to have to eventually decrease her diltiazem dose.  Currently, she feels well and she feels that things are stable, so she would like to make no changes.    2.  Her palpitations are most consistent with premature ectopics.  Again, I do not feel that increasing her AV michael blockers is indicated.  I asked her to anecdotally try magnesium oxide, 400 mg daily, to see if it helps with her palpitations.    3.  Her blood pressure is extremely well controlled.  As above, I suspect that we are eventually going to have to decrease her diltiazem dose.    Sincerely,       Venkatesh Friend MD

## 2023-03-31 ENCOUNTER — OFFICE VISIT (OUTPATIENT)
Dept: ORTHOPEDIC SURGERY | Facility: CLINIC | Age: 78
End: 2023-03-31
Payer: MEDICARE

## 2023-03-31 VITALS — WEIGHT: 172.62 LBS | HEIGHT: 64 IN | BODY MASS INDEX: 29.47 KG/M2 | HEART RATE: 82 BPM | OXYGEN SATURATION: 96 %

## 2023-03-31 DIAGNOSIS — M25.551 RIGHT HIP PAIN: Primary | ICD-10-CM

## 2023-03-31 DIAGNOSIS — M70.61 GREATER TROCHANTERIC BURSITIS OF RIGHT HIP: ICD-10-CM

## 2023-03-31 NOTE — PROGRESS NOTES
"Chief Complaint  Follow-up of the Right Hip    Subjective          History of Present Illness      Ely Coats is a 78 y.o. female  presents to [facility] for     Patient presents for follow-up evaluation of right hip pain, right hip bursitis.  She received injection on 2/17/2023 which she states did help her hip pain she has also been going to physical therapy once a week and doing home exercises.  She states the hip feels better and denies new injury or symptoms of pain, happy with her results.      Allergies   Allergen Reactions   • Statins Other (See Comments)     Hair loss crestor   • Penicillins Rash     Mild rash- pt states can take amoxicillin or keflex          Social History     Socioeconomic History   • Marital status:    Tobacco Use   • Smoking status: Never   • Smokeless tobacco: Never   Vaping Use   • Vaping Use: Never used   Substance and Sexual Activity   • Alcohol use: Never   • Drug use: Never   • Sexual activity: Defer        REVIEW OF SYSTEMS    Constitutional: Denies fevers, chills, weight loss  Cardiovascular: Denies chest pain, shortness of breath  Skin: Denies rashes, acute skin changes  Neurologic: Denies headache, loss of consciousness  MSK: Right hip pain      Objective   Vital Signs:   Pulse 82   Ht 162.6 cm (64\")   Wt 78.3 kg (172 lb 9.9 oz)   SpO2 96%   BMI 29.63 kg/m²     Body mass index is 29.63 kg/m².    Physical Exam    Right hip: Nontender to palpation, no pain with range of motion, active flexion, flexion 120 no pain with rotation, negative straight leg raise, neurovascular intact.    Procedures    Imaging Results (Most Recent)     None           Result Review :   The following data was reviewed by: BROOKE Bryant on 03/31/2023:               Assessment and Plan    Diagnoses and all orders for this visit:    1. Right hip pain (Primary)    2. Greater trochanteric bursitis of right hip        Discussed diagnosis and " treatment options with the patient she was advised to continue activity as tolerated follow-up in 6 weeks for possible hip injection she will finish therapy and continue home exercises.    Call or return if worsening symptoms.    Follow Up   Return in about 6 weeks (around 5/12/2023) for Recheck.  Patient was given instructions and counseling regarding her condition or for health maintenance advice. Please see specific information pulled into the AVS if appropriate.

## 2023-04-11 ENCOUNTER — OFFICE VISIT (OUTPATIENT)
Dept: ORTHOPEDIC SURGERY | Facility: CLINIC | Age: 78
End: 2023-04-11
Payer: MEDICARE

## 2023-04-11 VITALS — WEIGHT: 172 LBS | BODY MASS INDEX: 29.37 KG/M2 | HEIGHT: 64 IN

## 2023-04-11 DIAGNOSIS — M17.0 BILATERAL PRIMARY OSTEOARTHRITIS OF KNEE: Primary | ICD-10-CM

## 2023-04-11 PROCEDURE — 20610 DRAIN/INJ JOINT/BURSA W/O US: CPT | Performed by: ORTHOPAEDIC SURGERY

## 2023-04-11 RX ORDER — METHYLPREDNISOLONE ACETATE 80 MG/ML
80 INJECTION, SUSPENSION INTRA-ARTICULAR; INTRALESIONAL; INTRAMUSCULAR; SOFT TISSUE
Status: COMPLETED | OUTPATIENT
Start: 2023-04-11 | End: 2023-04-11

## 2023-04-11 RX ORDER — LIDOCAINE HYDROCHLORIDE 10 MG/ML
5 INJECTION, SOLUTION INFILTRATION; PERINEURAL
Status: COMPLETED | OUTPATIENT
Start: 2023-04-11 | End: 2023-04-11

## 2023-04-11 RX ADMIN — LIDOCAINE HYDROCHLORIDE 5 ML: 10 INJECTION, SOLUTION INFILTRATION; PERINEURAL at 10:48

## 2023-04-11 RX ADMIN — METHYLPREDNISOLONE ACETATE 80 MG: 80 INJECTION, SUSPENSION INTRA-ARTICULAR; INTRALESIONAL; INTRAMUSCULAR; SOFT TISSUE at 10:48

## 2023-04-11 NOTE — PROGRESS NOTES
"Chief Complaint  Pain and Follow-up of the Right Knee and Pain and Follow-up of the Left Knee     Subjective      Ely Coats presents to [facility] for follow up evaluation of the bilateral knees. The patient has been treating her bilateral knee osteoarthritis conservatively. She had a left knee injection in February. She is requesting a right knee steroid injection.     Allergies   Allergen Reactions   • Statins Other (See Comments)     Hair loss crestor   • Penicillins Rash     Mild rash- pt states can take amoxicillin or keflex          Social History     Socioeconomic History   • Marital status:    Tobacco Use   • Smoking status: Never   • Smokeless tobacco: Never   Vaping Use   • Vaping Use: Never used   Substance and Sexual Activity   • Alcohol use: Never   • Drug use: Never   • Sexual activity: Defer        Review of Systems     Objective   Vital Signs:   Ht 162.6 cm (64\")   Wt 78 kg (172 lb)   BMI 29.52 kg/m²       Physical Exam  Constitutional:       Appearance: Normal appearance. The patient is well-developed and normal weight.   HENT:      Head: Normocephalic.      Right Ear: Hearing and external ear normal.      Left Ear: Hearing and external ear normal.      Nose: Nose normal.   Eyes:      Conjunctiva/sclera: Conjunctivae normal.   Cardiovascular:      Rate and Rhythm: Normal rate.   Pulmonary:      Effort: Pulmonary effort is normal.      Breath sounds: No wheezing or rales.   Abdominal:      Palpations: Abdomen is soft.      Tenderness: There is no abdominal tenderness.   Musculoskeletal:      Cervical back: Normal range of motion.   Skin:     Findings: No rash.   Neurological:      Mental Status: The patient is alert and oriented to person, place, and time.   Psychiatric:         Mood and Affect: Mood and affect normal.         Judgment: Judgment normal.       Ortho Exam      Bilateral knees: Right knee: Crepitus with range of motion, skin is intact, " no erythema, no ecchymosis, no swelling, no effusion, no signs of infection, tender to palpation of the lateral and medial anterior knee, mild valgus deformity, full extension, flexion 120, superficial veins to leg, stable anterior/posterior drawer, stable to varus/valgus stress.  Left knee: Crepitus with range of motion, full extension, flexion 120, skin is intact, no erythema, no ecchymosis, no swelling, no signs of infection, stable to varus/valgus stress, stable anterior/posterior drawer.  Tenderness palpation anterior medial and lateral knee.    Right knee : R knee  Date/Time: 4/11/2023 10:48 AM  Consent given by: patient  Site marked: site marked  Timeout: Immediately prior to procedure a time out was called to verify the correct patient, procedure, equipment, support staff and site/side marked as required   Supporting Documentation  Indications: pain   Procedure Details  Location: knee - R knee  Needle gauge: 21G.  Medications administered: 5 mL lidocaine 1 %; 80 mg methylPREDNISolone acetate 80 MG/ML  Patient tolerance: patient tolerated the procedure well with no immediate complications            Imaging Results (Most Recent)     None           Result Review :       No results found.           Assessment and Plan     Diagnoses and all orders for this visit:    1. Bilateral primary osteoarthritis of knee (Primary)        Discussed the treatment plan with the patient.  Discussed the risks and benefits of Right knee steroid injection. The patient expressed understanding and wished to proceed. She tolerated the injection well    Call or return if worsening symptoms.    Follow Up     PRN      Patient was given instructions and counseling regarding her condition or for health maintenance advice. Please see specific information pulled into the AVS if appropriate.     Scribed for Geraldo Gann MD by Guerline Oh.  04/11/23   10:33 EDT    I have personally performed the services described in this  document as scribed by the above individual and it is both accurate and complete. Geraldo Gann MD 04/11/23

## 2023-05-12 ENCOUNTER — OFFICE VISIT (OUTPATIENT)
Dept: ORTHOPEDIC SURGERY | Facility: CLINIC | Age: 78
End: 2023-05-12
Payer: MEDICARE

## 2023-05-12 VITALS — WEIGHT: 171.2 LBS | HEIGHT: 64 IN | BODY MASS INDEX: 29.23 KG/M2

## 2023-05-12 DIAGNOSIS — M25.551 RIGHT HIP PAIN: Primary | ICD-10-CM

## 2023-05-12 DIAGNOSIS — M70.61 GREATER TROCHANTERIC BURSITIS OF RIGHT HIP: ICD-10-CM

## 2023-05-12 RX ORDER — TRIAMCINOLONE ACETONIDE 40 MG/ML
40 INJECTION, SUSPENSION INTRA-ARTICULAR; INTRAMUSCULAR
Status: COMPLETED | OUTPATIENT
Start: 2023-05-12 | End: 2023-05-12

## 2023-05-12 RX ORDER — LIDOCAINE HYDROCHLORIDE 10 MG/ML
5 INJECTION, SOLUTION INFILTRATION; PERINEURAL
Status: COMPLETED | OUTPATIENT
Start: 2023-05-12 | End: 2023-05-12

## 2023-05-12 RX ADMIN — LIDOCAINE HYDROCHLORIDE 5 ML: 10 INJECTION, SOLUTION INFILTRATION; PERINEURAL at 10:07

## 2023-05-12 RX ADMIN — TRIAMCINOLONE ACETONIDE 40 MG: 40 INJECTION, SUSPENSION INTRA-ARTICULAR; INTRAMUSCULAR at 10:07

## 2023-05-12 NOTE — PROGRESS NOTES
"Chief Complaint  Follow-up of the Right Hip and Injections    Subjective          History of Present Illness      Ely Coats is a 78 y.o. female  presents to [facility] for     Patient presents for follow-up evaluation right hip pain, right hip bursitis.  Patient states that her hip has been doing well with injections she receives.  Her last injection was 2/17/2023.  It has been helping until recently she states it started to wear off.  She states if she walks for too long the pain in the lateral hip begins to cause her trouble.  She denies new injury or symptoms of pain she is requesting right hip injection      Allergies   Allergen Reactions   • Statins Other (See Comments)     Hair loss crestor   • Penicillins Rash     Mild rash- pt states can take amoxicillin or keflex          Social History     Socioeconomic History   • Marital status:    Tobacco Use   • Smoking status: Never   • Smokeless tobacco: Never   Vaping Use   • Vaping Use: Never used   Substance and Sexual Activity   • Alcohol use: Never   • Drug use: Never   • Sexual activity: Defer        REVIEW OF SYSTEMS    Constitutional: Denies fevers, chills, weight loss  Cardiovascular: Denies chest pain, shortness of breath  Skin: Denies rashes, acute skin changes  Neurologic: Denies headache, loss of consciousness  MSK: Right hip pain      Objective   Vital Signs:   Ht 162.6 cm (64\")   Wt 77.7 kg (171 lb 3.2 oz)   BMI 29.39 kg/m²     Body mass index is 29.39 kg/m².    Physical Exam    Right hip: Tender palpation of lateral hip over the greater trochanter, full range of motion and strength, patient ambulates with nonantalgic gait.  Leg lengths are equal.    Large Joint Arthrocentesis: R greater trochanteric bursa  Date/Time: 5/12/2023 10:07 AM  Consent given by: patient  Site marked: site marked  Timeout: Immediately prior to procedure a time out was called to verify the correct patient, procedure, " equipment, support staff and site/side marked as required   Supporting Documentation  Indications: pain   Procedure Details  Location: hip - R greater trochanteric bursa  Preparation: Patient was prepped and draped in the usual sterile fashion  Needle size: 22 G  Approach: lateral  Medications administered: 5 mL lidocaine 1 %; 40 mg triamcinolone acetonide 40 MG/ML  Patient tolerance: patient tolerated the procedure well with no immediate complications          Imaging Results (Most Recent)     None           Result Review :   The following data was reviewed by: BROOKE Bryant on 05/12/2023:               Assessment and Plan    Diagnoses and all orders for this visit:    1. Right hip pain (Primary)    2. Greater trochanteric bursitis of right hip    Other orders  -     Large Joint Arthrocentesis: R greater trochanteric bursa        Discussed diagnosis and treatment options with the patient she was advised of treatment option with right hip steroid injection which she elected to have and tolerated well follow-up in 3 months.    Call or return if worsening symptoms.    Follow Up   Return in about 3 months (around 8/12/2023).  Patient was given instructions and counseling regarding her condition or for health maintenance advice. Please see specific information pulled into the AVS if appropriate.

## 2023-06-12 RX ORDER — LOSARTAN POTASSIUM 25 MG/1
TABLET ORAL
Qty: 90 TABLET | Refills: 0 | Status: SHIPPED | OUTPATIENT
Start: 2023-06-12

## 2023-08-17 ENCOUNTER — OFFICE VISIT (OUTPATIENT)
Dept: ORTHOPEDIC SURGERY | Facility: CLINIC | Age: 78
End: 2023-08-17
Payer: MEDICARE

## 2023-08-17 VITALS
OXYGEN SATURATION: 95 % | WEIGHT: 170.64 LBS | HEIGHT: 64 IN | BODY MASS INDEX: 29.13 KG/M2 | DIASTOLIC BLOOD PRESSURE: 73 MMHG | SYSTOLIC BLOOD PRESSURE: 133 MMHG | HEART RATE: 75 BPM

## 2023-08-17 DIAGNOSIS — M25.551 RIGHT HIP PAIN: Primary | ICD-10-CM

## 2023-08-17 DIAGNOSIS — M70.61 GREATER TROCHANTERIC BURSITIS OF RIGHT HIP: ICD-10-CM

## 2023-08-17 RX ORDER — PANTOPRAZOLE SODIUM 40 MG/1
TABLET, DELAYED RELEASE ORAL
COMMUNITY
Start: 2023-08-11

## 2023-08-17 RX ORDER — LIDOCAINE HYDROCHLORIDE 10 MG/ML
5 INJECTION, SOLUTION INFILTRATION; PERINEURAL
Status: COMPLETED | OUTPATIENT
Start: 2023-08-17 | End: 2023-08-17

## 2023-08-17 RX ORDER — TRIAMCINOLONE ACETONIDE 40 MG/ML
40 INJECTION, SUSPENSION INTRA-ARTICULAR; INTRAMUSCULAR
Status: COMPLETED | OUTPATIENT
Start: 2023-08-17 | End: 2023-08-17

## 2023-08-17 RX ADMIN — TRIAMCINOLONE ACETONIDE 40 MG: 40 INJECTION, SUSPENSION INTRA-ARTICULAR; INTRAMUSCULAR at 10:38

## 2023-08-17 RX ADMIN — LIDOCAINE HYDROCHLORIDE 5 ML: 10 INJECTION, SOLUTION INFILTRATION; PERINEURAL at 10:38

## 2023-08-17 NOTE — PROGRESS NOTES
"Chief Complaint  Pain and Follow-up of the Right Hip    Subjective          Pain      Ely Coats is a 78 y.o. female  presents to [facility] for     Patient presents for follow-up evaluation of right hip pain, right hip bursitis her last visit was 5/12/2023 where she received a right hip injection.  She has been seen injections with relief she would like to continue conservative treatment she points to the lateral hip as her area of pain she denies new injury or symptoms of pain she states pain is similar to past episodes and is requesting a right hip injection.      Allergies   Allergen Reactions    Statins Other (See Comments)     Hair loss crestor    Penicillins Rash     Mild rash- pt states can take amoxicillin or keflex          Social History     Socioeconomic History    Marital status:    Tobacco Use    Smoking status: Never    Smokeless tobacco: Never   Vaping Use    Vaping Use: Never used   Substance and Sexual Activity    Alcohol use: Never    Drug use: Never    Sexual activity: Defer        REVIEW OF SYSTEMS    Constitutional: Denies fevers, chills, weight loss  Cardiovascular: Denies chest pain, shortness of breath  Skin: Denies rashes, acute skin changes  Neurologic: Denies headache, loss of consciousness  MSK: Right hip pain      Objective   Vital Signs:   /73   Pulse 75   Ht 162.6 cm (64\")   Wt 77.4 kg (170 lb 10.2 oz)   SpO2 95%   BMI 29.29 kg/mý     Body mass index is 29.29 kg/mý.    Physical Exam    Right hip: Tender palpation lateral hip over greater trochanter, full range of motion and strength, patient ambulates with nonantalgic gait, leg lengths are equal    Large Joint Arthrocentesis: R greater trochanteric bursa  Date/Time: 8/17/2023 10:38 AM  Consent given by: patient  Site marked: site marked  Timeout: Immediately prior to procedure a time out was called to verify the correct patient, procedure, equipment, support staff and " site/side marked as required   Supporting Documentation  Indications: pain   Procedure Details  Location: hip - R greater trochanteric bursa  Preparation: Patient was prepped and draped in the usual sterile fashion  Needle size: 22 G  Approach: lateral  Medications administered: 5 mL lidocaine 1 %; 40 mg triamcinolone acetonide 40 MG/ML  Patient tolerance: patient tolerated the procedure well with no immediate complications      Imaging Results (Most Recent)       None             Result Review :   The following data was reviewed by: BROOKE Bryant on 08/17/2023:               Assessment and Plan    Diagnoses and all orders for this visit:    1. Right hip pain (Primary)    2. Greater trochanteric bursitis of right hip    Other orders  -     Large Joint Arthrocentesis: R greater trochanteric bursa        Discussed diagnosis and treatment options with the patient she elected to have right hip bursitis injection which she tolerated well follow-up in 3 months    Call or return if worsening symptoms.    Follow Up   Return in about 3 months (around 11/17/2023).  Patient was given instructions and counseling regarding her condition or for health maintenance advice. Please see specific information pulled into the AVS if appropriate.

## 2023-09-13 RX ORDER — LOSARTAN POTASSIUM 25 MG/1
TABLET ORAL
Qty: 90 TABLET | Refills: 0 | Status: SHIPPED | OUTPATIENT
Start: 2023-09-13

## 2023-10-16 ENCOUNTER — OFFICE VISIT (OUTPATIENT)
Dept: ORTHOPEDIC SURGERY | Facility: CLINIC | Age: 78
End: 2023-10-16
Payer: MEDICARE

## 2023-10-16 VITALS
DIASTOLIC BLOOD PRESSURE: 71 MMHG | OXYGEN SATURATION: 97 % | HEIGHT: 64 IN | BODY MASS INDEX: 29.13 KG/M2 | WEIGHT: 170.64 LBS | HEART RATE: 65 BPM | SYSTOLIC BLOOD PRESSURE: 112 MMHG

## 2023-10-16 DIAGNOSIS — M25.562 PAIN IN BOTH KNEES, UNSPECIFIED CHRONICITY: ICD-10-CM

## 2023-10-16 DIAGNOSIS — M25.561 PAIN IN BOTH KNEES, UNSPECIFIED CHRONICITY: ICD-10-CM

## 2023-10-16 DIAGNOSIS — M17.0 BILATERAL PRIMARY OSTEOARTHRITIS OF KNEE: Primary | ICD-10-CM

## 2023-10-16 PROCEDURE — 20610 DRAIN/INJ JOINT/BURSA W/O US: CPT | Performed by: PHYSICIAN ASSISTANT

## 2023-10-16 PROCEDURE — 3078F DIAST BP <80 MM HG: CPT | Performed by: PHYSICIAN ASSISTANT

## 2023-10-16 PROCEDURE — 3074F SYST BP LT 130 MM HG: CPT | Performed by: PHYSICIAN ASSISTANT

## 2023-10-16 PROCEDURE — 99213 OFFICE O/P EST LOW 20 MIN: CPT | Performed by: PHYSICIAN ASSISTANT

## 2023-10-16 PROCEDURE — 1160F RVW MEDS BY RX/DR IN RCRD: CPT | Performed by: PHYSICIAN ASSISTANT

## 2023-10-16 PROCEDURE — 1159F MED LIST DOCD IN RCRD: CPT | Performed by: PHYSICIAN ASSISTANT

## 2023-10-16 RX ORDER — LIDOCAINE HYDROCHLORIDE 10 MG/ML
5 INJECTION, SOLUTION INFILTRATION; PERINEURAL
Status: COMPLETED | OUTPATIENT
Start: 2023-10-16 | End: 2023-10-16

## 2023-10-16 RX ORDER — BLOOD SUGAR DIAGNOSTIC
STRIP MISCELLANEOUS
COMMUNITY

## 2023-10-16 RX ORDER — TRIAMCINOLONE ACETONIDE 40 MG/ML
40 INJECTION, SUSPENSION INTRA-ARTICULAR; INTRAMUSCULAR
Status: COMPLETED | OUTPATIENT
Start: 2023-10-16 | End: 2023-10-16

## 2023-10-16 RX ADMIN — TRIAMCINOLONE ACETONIDE 40 MG: 40 INJECTION, SUSPENSION INTRA-ARTICULAR; INTRAMUSCULAR at 10:02

## 2023-10-16 RX ADMIN — LIDOCAINE HYDROCHLORIDE 5 ML: 10 INJECTION, SOLUTION INFILTRATION; PERINEURAL at 10:02

## 2023-10-16 RX ADMIN — TRIAMCINOLONE ACETONIDE 40 MG: 40 INJECTION, SUSPENSION INTRA-ARTICULAR; INTRAMUSCULAR at 10:01

## 2023-10-16 RX ADMIN — LIDOCAINE HYDROCHLORIDE 5 ML: 10 INJECTION, SOLUTION INFILTRATION; PERINEURAL at 10:01

## 2023-10-16 NOTE — PROGRESS NOTES
Chief Complaint  Pain and Follow-up of the Left Knee and Pain and Follow-up of the Right Knee    Subjective          Pain        Ely Coats is a 78 y.o. female  presents to [facility] for     Patient presents for follow-up evaluation of bilateral knee pain, bilateral knee osteoarthritis.  She states her right knee is worse than the left she has been getting injections every 3 months she states the last injection for her right knee did not help she states the left knee still does get some benefit from the injections.  She states that she would like to discuss right knee replacement surgery due to the severity of her right knee pain.  Patient states that her  was recently admitted and he is now at a rehab facility.  She states she has no help at home to care for him or for herself when it comes to knee replacement.  She also has a history of A-fib and states she has a cardiologist caring for her for this.      Allergies   Allergen Reactions    Statins Other (See Comments)     Hair loss crestor    Penicillins Rash     Mild rash- pt states can take amoxicillin or keflex          Social History     Socioeconomic History    Marital status:    Tobacco Use    Smoking status: Never    Smokeless tobacco: Never   Vaping Use    Vaping Use: Never used   Substance and Sexual Activity    Alcohol use: Never    Drug use: Never    Sexual activity: Defer        REVIEW OF SYSTEMS    Constitutional: Awake alert and oriented x3, no acute distress, denies fevers, chills, weight loss  Respiratory: No respiratory distress  Vascular: Brisk cap refill, Intact distal pulses, No cyanosis, compartments soft with no signs or symptoms of compartment syndrome or DVT.   Cardiovascular: Denies chest pain, shortness of breath  Skin: Denies rashes, acute skin changes  Neurologic: Denies headache, loss of consciousness  MSK: Bilateral knee pain      Objective   Vital Signs:   /71   Pulse 65   " Ht 162.6 cm (64\")   Wt 77.4 kg (170 lb 10.2 oz)   SpO2 97%   BMI 29.29 kg/m²     Body mass index is 29.29 kg/m².    Physical Exam         Bilateral knees: Right knee: Crepitus with range of motion, skin is intact, no erythema, no ecchymosis, no swelling, no effusion, no signs of infection, tender to palpation of the lateral and medial anterior knee, mild valgus deformity, full extension, flexion 120, superficial veins to leg, stable anterior/posterior drawer, stable to varus/valgus stress.  Left knee: Crepitus with range of motion, full extension, flexion 120, skin is intact, no erythema, no ecchymosis, no swelling, no signs of infection, stable to varus/valgus stress, stable anterior/posterior drawer.  Tenderness palpation anterior medial and lateral knee          Large Joint Arthrocentesis: R knee  Date/Time: 10/16/2023 10:01 AM  Consent given by: patient  Site marked: site marked  Timeout: Immediately prior to procedure a time out was called to verify the correct patient, procedure, equipment, support staff and site/side marked as required   Supporting Documentation  Indications: pain   Procedure Details  Location: knee - R knee  Preparation: Patient was prepped and draped in the usual sterile fashion  Needle gauge: 21 G.  Approach: lateral  Medications administered: 5 mL lidocaine 1 %; 40 mg triamcinolone acetonide 40 MG/ML  Patient tolerance: patient tolerated the procedure well with no immediate complications      Large Joint Arthrocentesis: L knee  Date/Time: 10/16/2023 10:02 AM  Consent given by: patient  Site marked: site marked  Timeout: Immediately prior to procedure a time out was called to verify the correct patient, procedure, equipment, support staff and site/side marked as required   Supporting Documentation  Indications: pain   Procedure Details  Location: knee - L knee  Preparation: Patient was prepped and draped in the usual sterile fashion  Needle gauge: 21 G.  Approach: lateral  Medications " administered: 5 mL lidocaine 1 %; 40 mg triamcinolone acetonide 40 MG/ML  Patient tolerance: patient tolerated the procedure well with no immediate complications        Imaging Results (Most Recent)       None             Result Review :   The following data was reviewed by: BROOKE Bryant on 10/16/2023:               Assessment and Plan    Diagnoses and all orders for this visit:    1. Bilateral primary osteoarthritis of knee (Primary)    2. Pain in both knees, unspecified chronicity    Other orders  -     Large Joint Arthrocentesis: R knee  -     Large Joint Arthrocentesis: L knee        Discussed diagnosis and treatment options for the patient discussed risk benefits procedure and recovery of right total knee arthroplasty she would like to consider this and arrange her home life to allow her to have a knee replacement and have support for recovery.  She may consider rehab for several weeks for her knee replacement.  Patient will call to follow-up to possibly have a knee replacement discussion with Dr. Gann she may call at any time, she did elect to have bilateral knee steroid injections today which she tolerated well.  She was advised of an 8-week window of waiting for knee replacement after injections, patient was agreeable to this plan follow-up in 3 months unless sooner for surgery discussion for the right knee    Call or return if worsening symptoms.    Follow Up   Return in about 3 months (around 1/16/2024) for Recheck.  Patient was given instructions and counseling regarding her condition or for health maintenance advice. Please see specific information pulled into the AVS if appropriate.

## 2023-11-17 ENCOUNTER — OFFICE VISIT (OUTPATIENT)
Dept: ORTHOPEDIC SURGERY | Facility: CLINIC | Age: 78
End: 2023-11-17
Payer: MEDICARE

## 2023-11-17 ENCOUNTER — PREP FOR SURGERY (OUTPATIENT)
Dept: OTHER | Facility: HOSPITAL | Age: 78
End: 2023-11-17
Payer: MEDICARE

## 2023-11-17 VITALS
HEIGHT: 64 IN | HEART RATE: 66 BPM | OXYGEN SATURATION: 97 % | DIASTOLIC BLOOD PRESSURE: 79 MMHG | SYSTOLIC BLOOD PRESSURE: 161 MMHG | WEIGHT: 160 LBS | BODY MASS INDEX: 27.31 KG/M2

## 2023-11-17 DIAGNOSIS — M17.11 PRIMARY OSTEOARTHRITIS OF RIGHT KNEE: Primary | ICD-10-CM

## 2023-11-17 RX ORDER — TRANEXAMIC ACID 10 MG/ML
1000 INJECTION, SOLUTION INTRAVENOUS ONCE
OUTPATIENT
Start: 2023-11-17 | End: 2023-11-17

## 2023-11-17 RX ORDER — CEFAZOLIN SODIUM 2 G/100ML
2 INJECTION, SOLUTION INTRAVENOUS ONCE
OUTPATIENT
Start: 2023-11-17 | End: 2023-11-17

## 2023-11-17 NOTE — PROGRESS NOTES
"Chief Complaint  Follow-up of the Right Knee     Subjective      Ely Coats presents to [facility] for follow up evaluation of the right knee. The patient has been treating her right knee osteoarthritis conservatively. She denies any relief with the last injection. She has advanced osteoarthritis in her right knee. She has no new complaints.     Allergies   Allergen Reactions    Statins Other (See Comments)     Hair loss crestor    Penicillins Rash     Mild rash- pt states can take amoxicillin or keflex          Social History     Socioeconomic History    Marital status:    Tobacco Use    Smoking status: Never    Smokeless tobacco: Never   Vaping Use    Vaping Use: Never used   Substance and Sexual Activity    Alcohol use: Never    Drug use: Never    Sexual activity: Defer        I reviewed the patient's chief complaint, history of present illness, review of systems, past medical history, surgical history, family history, social history, medications, and allergy list.     Review of Systems     Constitutional: Denies fevers, chills, weight loss  Cardiovascular: Denies chest pain, shortness of breath  Skin: Denies rashes, acute skin changes  Neurologic: Denies headache, loss of consciousness  MSK: Right knee pain      Vital Signs:   /79   Pulse 66   Ht 162.6 cm (64\")   Wt 72.6 kg (160 lb)   SpO2 97%   BMI 27.46 kg/m²          Physical Exam  General: Alert. No acute distress    Ortho Exam      Right knee- Crepitus with range of motion, skin is intact, no erythema, no ecchymosis, no swelling, no effusion, no signs of infection, tender to palpation of the lateral and medial anterior knee, mild valgus deformity, full extension, flexion 120, superficial veins to leg, stable anterior/posterior drawer, stable to varus/valgus stress.     Procedures        Imaging Results (Most Recent)       None             Result Review :       No results found.           Assessment " and Plan     Diagnoses and all orders for this visit:    1. Primary osteoarthritis of right knee (Primary)        Discussed the treatment options with the patient, operative vs non-operative. Discussed the risks and benefits of a Right Total Knee Arthroplasty with Rosio Robot. The patient expressed understanding and wished to proceed. She will need cardiac clearance    Discussed surgery., Risks/benefits discussed with patient including, but not limited to: infection, bleeding, neurovascular damage, malunion, nonunion, aesthetic deformity, need for further surgery, and death., Discussed with patient the implant type being used during surgery and patient understands., Surgery pamphlet given., Call or return if worsening symptoms., and DME order for a 3 in 1 given today due to patient will be confined to one room/level of the home that does not offer a toilet during postop recovery.     Follow Up     2 weeks postoperatively.        Patient was given instructions and counseling regarding her condition or for health maintenance advice. Please see specific information pulled into the AVS if appropriate.     Scribed for Geraldo Gann MD by Guerline Oh.  11/17/23   10:16 EST    I have personally performed the services described in this document as scribed by the above individual and it is both accurate and complete. Geraldo Gann MD 11/18/23

## 2023-12-07 RX ORDER — LOSARTAN POTASSIUM 25 MG/1
TABLET ORAL
Qty: 90 TABLET | Refills: 0 | Status: SHIPPED | OUTPATIENT
Start: 2023-12-07

## 2023-12-21 ENCOUNTER — OFFICE VISIT (OUTPATIENT)
Dept: INTERNAL MEDICINE | Facility: CLINIC | Age: 78
End: 2023-12-21
Payer: MEDICARE

## 2023-12-21 ENCOUNTER — PATIENT ROUNDING (BHMG ONLY) (OUTPATIENT)
Dept: INTERNAL MEDICINE | Facility: CLINIC | Age: 78
End: 2023-12-21
Payer: MEDICARE

## 2023-12-21 VITALS
DIASTOLIC BLOOD PRESSURE: 82 MMHG | HEIGHT: 64 IN | HEART RATE: 56 BPM | OXYGEN SATURATION: 98 % | WEIGHT: 168.4 LBS | TEMPERATURE: 97 F | BODY MASS INDEX: 28.75 KG/M2 | SYSTOLIC BLOOD PRESSURE: 136 MMHG

## 2023-12-21 DIAGNOSIS — M17.0 BILATERAL PRIMARY OSTEOARTHRITIS OF KNEE: ICD-10-CM

## 2023-12-21 DIAGNOSIS — E11.65 TYPE 2 DIABETES MELLITUS WITH HYPERGLYCEMIA, UNSPECIFIED WHETHER LONG TERM INSULIN USE: ICD-10-CM

## 2023-12-21 DIAGNOSIS — Z76.89 ESTABLISHING CARE WITH NEW DOCTOR, ENCOUNTER FOR: Primary | ICD-10-CM

## 2023-12-21 DIAGNOSIS — E66.3 OVERWEIGHT (BMI 25.0-29.9): ICD-10-CM

## 2023-12-21 DIAGNOSIS — M25.551 RIGHT HIP PAIN: ICD-10-CM

## 2023-12-21 DIAGNOSIS — I48.0 PAROXYSMAL ATRIAL FIBRILLATION: Chronic | ICD-10-CM

## 2023-12-21 DIAGNOSIS — I10 ESSENTIAL HYPERTENSION: Chronic | ICD-10-CM

## 2023-12-21 DIAGNOSIS — E78.2 MIXED HYPERLIPIDEMIA: ICD-10-CM

## 2023-12-21 LAB
ALBUMIN SERPL-MCNC: 4 G/DL (ref 3.5–5.2)
ALBUMIN/GLOB SERPL: 1.7 G/DL
ALP SERPL-CCNC: 60 U/L (ref 39–117)
ALT SERPL W P-5'-P-CCNC: 17 U/L (ref 1–33)
ANION GAP SERPL CALCULATED.3IONS-SCNC: 12.6 MMOL/L (ref 5–15)
AST SERPL-CCNC: 20 U/L (ref 1–32)
BILIRUB SERPL-MCNC: 0.3 MG/DL (ref 0–1.2)
BUN SERPL-MCNC: 16 MG/DL (ref 8–23)
BUN/CREAT SERPL: 19.8 (ref 7–25)
CALCIUM SPEC-SCNC: 9.1 MG/DL (ref 8.6–10.5)
CHLORIDE SERPL-SCNC: 107 MMOL/L (ref 98–107)
CHOLEST SERPL-MCNC: 203 MG/DL (ref 0–200)
CO2 SERPL-SCNC: 25.4 MMOL/L (ref 22–29)
CREAT SERPL-MCNC: 0.81 MG/DL (ref 0.57–1)
EGFRCR SERPLBLD CKD-EPI 2021: 73.5 ML/MIN/1.73
GLOBULIN UR ELPH-MCNC: 2.3 GM/DL
GLUCOSE SERPL-MCNC: 93 MG/DL (ref 65–99)
HBA1C MFR BLD: 6.5 % (ref 4.8–5.6)
HDLC SERPL-MCNC: 60 MG/DL (ref 40–60)
LDLC SERPL CALC-MCNC: 119 MG/DL (ref 0–100)
LDLC/HDLC SERPL: 1.93 {RATIO}
POTASSIUM SERPL-SCNC: 4.4 MMOL/L (ref 3.5–5.2)
PROT SERPL-MCNC: 6.3 G/DL (ref 6–8.5)
SODIUM SERPL-SCNC: 145 MMOL/L (ref 136–145)
TRIGL SERPL-MCNC: 136 MG/DL (ref 0–150)
VLDLC SERPL-MCNC: 24 MG/DL (ref 5–40)

## 2023-12-21 PROCEDURE — 80061 LIPID PANEL: CPT | Performed by: STUDENT IN AN ORGANIZED HEALTH CARE EDUCATION/TRAINING PROGRAM

## 2023-12-21 PROCEDURE — 83036 HEMOGLOBIN GLYCOSYLATED A1C: CPT | Performed by: STUDENT IN AN ORGANIZED HEALTH CARE EDUCATION/TRAINING PROGRAM

## 2023-12-21 PROCEDURE — 80053 COMPREHEN METABOLIC PANEL: CPT | Performed by: STUDENT IN AN ORGANIZED HEALTH CARE EDUCATION/TRAINING PROGRAM

## 2023-12-21 NOTE — PROGRESS NOTES
Chief Complaint  Hypertension, Diabetes, Hyperlipidemia, and Knee Pain (Right knee hurts, patient states she is having surgery on it )    Subjective          Ely Coats presents to [facility]  History of Present Illness    Previous PCP: George Cox   Specialist(s): Ortho  (Azeem Greco), Urology (Karl Murphy) , ENT, cardiology (Venkatesh Friend, previously followed Rigoberto), Derm (Dr. Sanchez)  COVID vaccine: Yes   Pneumonia vaccine: Yes  Shingles vaccine: Yes  Colon cancer screenin years ago  Mammogram: This year   Pap Smear: Yes, a long time ago   DEXA/Bone Density: Yes, years ago     Mrs Lizzette Coats is an 79 yo F with A fib on Eliquis, HTN, right knee osteoarthritis, and T2DM here to establish care.  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    Has right knee replacement planned for January.    Has a history of kidney stones, and follows with Karl Murphy of Urology.  Next visit is May.    Mrs. Lizzette Coats reports a history of treated basal cell skin cancer on her back and face.  Follows with a Dr. Sanchez of dermatology in  [place]    No known history of CVA or MI.    Checks BP at home just about daily.  She is uncertain what her BP range is.  She is not adherent with low sodium diet.    No longer taking zetia.  She reports taht Zetia makes hair fall out.  Had similar issues on statins.    On , will have her next eye exam at [facility] ([facility]    She checks blood sugar every morning.  Generally runs in the 120s.  No hypoglycemic measurements.    Has had some mechanical falls in the last year (tripping).    PHQ-9 Depression Screening  Little interest or pleasure in doing things? 0-->not at all   Feeling down, depressed, or hopeless? 0-->not at all   Trouble falling or staying asleep, or sleeping too much?     Feeling tired or having little energy?     Poor appetite or overeating?     Feeling  "bad about yourself - or that you are a failure or have let yourself or your family down?     Trouble concentrating on things, such as reading the newspaper or watching television?     Moving or speaking so slowly that other people could have noticed? Or the opposite - being so fidgety or restless that you have been moving around a lot more than usual?     Thoughts that you would be better off dead, or of hurting yourself in some way?     PHQ-9 Total Score 0   If you checked off any problems, how difficult have these problems made it for you to do your work, take care of things at home, or get along with other people?           Current Outpatient Medications   Medication Instructions    Accu-Chek Guide test strip USE 1 STRIP TWICE DAILY    apixaban (ELIQUIS) 5 mg, Oral, 2 Times Daily, LAST DOSE 4/30/22    carvedilol (COREG) 6.25 MG tablet 1 tablet, Oral, 2 Times Daily With Meals    cholecalciferol (VITAMIN D3) 1,000 Units, Oral, Daily, INST PER ANESTHESIA PROTOCOL    cyanocobalamin (VITAMIN B-12) 500 mcg, Oral, Daily    dilTIAZem CD (CARDIZEM CD) 240 MG 24 hr capsule Daily    furosemide (LASIX) 20 mg, Oral, Daily    losartan (COZAAR) 25 MG tablet Take 1 tablet by mouth once daily    metFORMIN (GLUCOPHAGE) 500 mg, Oral, 2 Times Daily With Meals, INST PER ANESTHESIA PROTOCOL HOLD Monday NIGHT OR Tuesday AM    Multiple Vitamin (MULTIVITAMIN) tablet 1 tablet, Oral, Daily    pantoprazole (PROTONIX) 40 MG EC tablet        The following portions of the patient's history were reviewed and updated as appropriate: allergies, current medications, past family history, past medical history, past social history, past surgical history, and problem list.    Objective   Vital Signs:   /82 (BP Location: Left arm, Patient Position: Sitting, Cuff Size: Adult)   Pulse 56   Temp 97 °F (36.1 °C) (Temporal)   Ht 162.6 cm (64\")   Wt 76.4 kg (168 lb 6.4 oz)   SpO2 98%   BMI 28.91 kg/m²     BP Readings from Last 3 Encounters: "   12/21/23 136/82   11/17/23 161/79   10/16/23 112/71     Wt Readings from Last 3 Encounters:   12/21/23 76.4 kg (168 lb 6.4 oz)   11/17/23 72.6 kg (160 lb)   10/16/23 77.4 kg (170 lb 10.2 oz)           Physical Exam  Vitals reviewed.   Constitutional:       General: She is not in acute distress.     Appearance: Normal appearance. She is not ill-appearing, toxic-appearing or diaphoretic.   HENT:      Head: Normocephalic and atraumatic.      Right Ear: External ear normal.      Left Ear: External ear normal.   Eyes:      Conjunctiva/sclera: Conjunctivae normal.   Cardiovascular:      Rate and Rhythm: Normal rate and regular rhythm.      Pulses: Normal pulses.      Heart sounds: Normal heart sounds. No murmur heard.     No friction rub. No gallop.   Pulmonary:      Effort: Pulmonary effort is normal. No respiratory distress.      Breath sounds: Normal breath sounds. No stridor. No wheezing, rhonchi or rales.   Chest:      Chest wall: No tenderness.   Abdominal:      General: Abdomen is flat.      Palpations: Abdomen is soft. There is no mass.      Tenderness: There is no abdominal tenderness.   Musculoskeletal:      Right lower leg: Edema present.      Left lower leg: Edema present.      Comments: 1+ LE edema bilaterally   Skin:     General: Skin is warm and dry.   Neurological:      General: No focal deficit present.      Mental Status: She is alert. Mental status is at baseline.   Psychiatric:         Mood and Affect: Mood normal.         Behavior: Behavior normal.         Thought Content: Thought content normal.         Judgment: Judgment normal.       Result Review :   The following data was reviewed by: Julius Tijerina MD on 12/21/2023:  Common labs          8/11/2023    15:33 9/20/2023    07:37 10/24/2023    10:42   Common Labs   Potassium  3.4        WBC 6.70      8.45       Hemoglobin 12.4      12.2       Hematocrit 39.0      39.2       Platelets 278      323          Details          This result is from an  external source.                    Lab Results   Component Value Date    COVID19 NOT DETECTED 07/15/2020    INR 1.1 (A) 08/11/2023    BILIRUBINUR 1 mg/dL (A) 10/24/2023       Procedures        Assessment and Plan    Diagnoses and all orders for this visit:    1. Establishing care with new doctor, encounter for (Primary)    2. Essential hypertension  -     Comprehensive Metabolic Panel    3. Paroxysmal atrial fibrillation    4. Mixed hyperlipidemia  -     Lipid Panel    5. Right hip pain    6. Bilateral primary osteoarthritis of knee    7. Type 2 diabetes mellitus with hyperglycemia, unspecified whether long term insulin use  -     Comprehensive Metabolic Panel  -     Hemoglobin A1c    8. Overweight (BMI 25.0-29.9)      A fib:  -on eliquis, coreg, dilt    HTN:  -stable, on diltz, coreg, losartan, lasix  -low sodium dietary counseling today    HLD:  -I did  her that to the best of my knowledge, zetia is not known to cause hair loss        Medications Discontinued During This Encounter   Medication Reason    mupirocin (BACTROBAN) 2 % ointment     ezetimibe (ZETIA) 10 MG tablet           Follow Up   Return in about 3 months (around 3/21/2024) for Type 2 Diabetes, Hypertension.  Patient was given instructions and counseling regarding her condition or for health maintenance advice. Please see specific information pulled into the AVS if appropriate.       Julius Tijerina MD  12/21/23  11:58 EST

## 2023-12-22 NOTE — PROGRESS NOTES
".\"A ShowEvidence message has been sent to the patient for PATIENT ROUNDING with [facility]\"  "

## 2023-12-26 DIAGNOSIS — Z47.1 AFTERCARE FOLLOWING RIGHT KNEE JOINT REPLACEMENT SURGERY: Primary | ICD-10-CM

## 2023-12-26 DIAGNOSIS — Z96.651 AFTERCARE FOLLOWING RIGHT KNEE JOINT REPLACEMENT SURGERY: Primary | ICD-10-CM

## 2024-01-08 DIAGNOSIS — Z01.818 ENCOUNTER FOR PREADMISSION TESTING: Primary | ICD-10-CM

## 2024-01-09 ENCOUNTER — TELEPHONE (OUTPATIENT)
Dept: CARDIOLOGY | Facility: CLINIC | Age: 79
End: 2024-01-09
Payer: MEDICARE

## 2024-01-09 ENCOUNTER — PRE-ADMISSION TESTING (OUTPATIENT)
Dept: PREADMISSION TESTING | Facility: HOSPITAL | Age: 79
End: 2024-01-09
Payer: MEDICARE

## 2024-01-09 VITALS
WEIGHT: 165.34 LBS | OXYGEN SATURATION: 96 % | BODY MASS INDEX: 28.23 KG/M2 | RESPIRATION RATE: 18 BRPM | HEART RATE: 66 BPM | HEIGHT: 64 IN | TEMPERATURE: 97.1 F

## 2024-01-09 DIAGNOSIS — M17.11 PRIMARY OSTEOARTHRITIS OF RIGHT KNEE: ICD-10-CM

## 2024-01-09 DIAGNOSIS — Z01.818 ENCOUNTER FOR PREADMISSION TESTING: ICD-10-CM

## 2024-01-09 LAB
ALBUMIN SERPL-MCNC: 4 G/DL (ref 3.5–5.2)
ALBUMIN/GLOB SERPL: 1.6 G/DL
ALP SERPL-CCNC: 62 U/L (ref 39–117)
ALT SERPL W P-5'-P-CCNC: 11 U/L (ref 1–33)
ANION GAP SERPL CALCULATED.3IONS-SCNC: 11.3 MMOL/L (ref 5–15)
AST SERPL-CCNC: 14 U/L (ref 1–32)
BASOPHILS # BLD AUTO: 0.04 10*3/MM3 (ref 0–0.2)
BASOPHILS NFR BLD AUTO: 0.7 % (ref 0–1.5)
BILIRUB SERPL-MCNC: 0.2 MG/DL (ref 0–1.2)
BUN SERPL-MCNC: 18 MG/DL (ref 8–23)
BUN/CREAT SERPL: 18.8 (ref 7–25)
CALCIUM SPEC-SCNC: 9.2 MG/DL (ref 8.6–10.5)
CHLORIDE SERPL-SCNC: 105 MMOL/L (ref 98–107)
CO2 SERPL-SCNC: 24.7 MMOL/L (ref 22–29)
CREAT SERPL-MCNC: 0.96 MG/DL (ref 0.57–1)
DEPRECATED RDW RBC AUTO: 50 FL (ref 37–54)
EGFRCR SERPLBLD CKD-EPI 2021: 59.9 ML/MIN/1.73
EOSINOPHIL # BLD AUTO: 0.13 10*3/MM3 (ref 0–0.4)
EOSINOPHIL NFR BLD AUTO: 2.3 % (ref 0.3–6.2)
ERYTHROCYTE [DISTWIDTH] IN BLOOD BY AUTOMATED COUNT: 13.8 % (ref 12.3–15.4)
GLOBULIN UR ELPH-MCNC: 2.5 GM/DL
GLUCOSE SERPL-MCNC: 108 MG/DL (ref 65–99)
HBA1C MFR BLD: 6.5 % (ref 4.8–5.6)
HCT VFR BLD AUTO: 38 % (ref 34–46.6)
HGB BLD-MCNC: 12.3 G/DL (ref 12–15.9)
IMM GRANULOCYTES # BLD AUTO: 0.01 10*3/MM3 (ref 0–0.05)
IMM GRANULOCYTES NFR BLD AUTO: 0.2 % (ref 0–0.5)
INR PPP: 1.1 (ref 0.86–1.15)
LYMPHOCYTES # BLD AUTO: 1.51 10*3/MM3 (ref 0.7–3.1)
LYMPHOCYTES NFR BLD AUTO: 26.2 % (ref 19.6–45.3)
MCH RBC QN AUTO: 32 PG (ref 26.6–33)
MCHC RBC AUTO-ENTMCNC: 32.4 G/DL (ref 31.5–35.7)
MCV RBC AUTO: 99 FL (ref 79–97)
MONOCYTES # BLD AUTO: 0.41 10*3/MM3 (ref 0.1–0.9)
MONOCYTES NFR BLD AUTO: 7.1 % (ref 5–12)
NEUTROPHILS NFR BLD AUTO: 3.66 10*3/MM3 (ref 1.7–7)
NEUTROPHILS NFR BLD AUTO: 63.5 % (ref 42.7–76)
NRBC BLD AUTO-RTO: 0 /100 WBC (ref 0–0.2)
PLATELET # BLD AUTO: 268 10*3/MM3 (ref 140–450)
PMV BLD AUTO: 10.3 FL (ref 6–12)
POTASSIUM SERPL-SCNC: 4.4 MMOL/L (ref 3.5–5.2)
PROT SERPL-MCNC: 6.5 G/DL (ref 6–8.5)
PROTHROMBIN TIME: 14.5 SECONDS (ref 11.8–14.9)
RBC # BLD AUTO: 3.84 10*6/MM3 (ref 3.77–5.28)
SODIUM SERPL-SCNC: 141 MMOL/L (ref 136–145)
WBC NRBC COR # BLD AUTO: 5.76 10*3/MM3 (ref 3.4–10.8)

## 2024-01-09 PROCEDURE — 85025 COMPLETE CBC W/AUTO DIFF WBC: CPT

## 2024-01-09 PROCEDURE — 83036 HEMOGLOBIN GLYCOSYLATED A1C: CPT

## 2024-01-09 PROCEDURE — 36415 COLL VENOUS BLD VENIPUNCTURE: CPT

## 2024-01-09 PROCEDURE — 80053 COMPREHEN METABOLIC PANEL: CPT

## 2024-01-09 PROCEDURE — 85610 PROTHROMBIN TIME: CPT

## 2024-01-09 RX ORDER — FUROSEMIDE 20 MG/1
20 TABLET ORAL DAILY
COMMUNITY

## 2024-01-09 NOTE — SIGNIFICANT NOTE
PT  WISHES TO BE INPATIENT PRIOR TO HOME HEALTH REHAB R/T  HER BEING THE PRIMARY CAREGIVER TO HER .

## 2024-01-09 NOTE — TELEPHONE ENCOUNTER
Doreen with PAT called the office requesting cardiac clearance for this patient.  Pt is having a R total knee replacement on 1/17/24.  Orthopedics would prefer to have apixaban held for 3 days prior to surgery if possible.    Do you have any recommendations for this patient?    LOV 2/23/23    Thank you,    Holly MEDINA RN  [facility] Triage  01/09/24  09:00 EST

## 2024-01-09 NOTE — DISCHARGE INSTRUCTIONS
IMPORTANT INSTRUCTIONS - PRE-ADMISSION TESTING  DO NOT EAT OR CHEW anything after midnight the night before your procedure.    You may have CLEAR liquids up to 3 hours prior to ARRIVAL time.   Take the following medications the morning of your procedure with JUST A SIP OF WATER:CARVEDILOL,  PANTOPRAZOLE, DILTIAZEM AND  MAGNESIUM    DO NOT BRING your medications to the hospital with you, UNLESS something has changed since your PRE-Admission Testing appointment.  Hold all vitamins, supplements, and NSAIDS (Non- steroidal anti-inflammatory meds) for one week prior to surgery (you MAY take Tylenol or Acetaminophen).MULTIVITAMIN, VITAMIN B12, VITAMIN D  DR. BEAVERS'S OFFICE WILL NOTIFY YOU WHEN  TO HOLD ELIQUIS PRIOR  TO SURGERY  If you are diabetic, check your blood sugar the morning of your procedure. If it is less than 70 or if you are feeling symptomatic, call the following number for further instructions: [phone] SAME DAY SURGERY.  Use your inhalers/nebulizers as usual, the morning of your procedure. BRING YOUR INHALERS with you.   Bring your CPAP or BIPAP to hospital, ONLY IF YOU WILL BE SPENDING THE NIGHT.   Make sure you have a ride home and have someone who will stay with you the day of your procedure after you go home.  If you have any questions, please call your Pre-Admission Testing NurseCHER at [phone]_.   Per anesthesia request, do not smoke for 24 hours before your procedure or as instructed by your surgeon.  Clear Liquid Diet        Find out when you need to start a clear liquid diet.   Think of “clear liquids” as anything you could read a newspaper through. This includes things like water, broth, sports drinks, or tea WITHOUT any kind of milk or cream.           Once you are told to start a clear liquid diet, only drink these things until 3 hours before arrival to the hospital or when the hospital says to stop. Total volume limitation: 8 oz.       Clear liquids you CAN drink:   Water    Clear broth: beef, chicken, vegetable, or bone broth with nothing in it   Gatorade   Lemonade or Tee-aid   Soda   Tea, coffee (NO cream or honey)   Jell-O (without fruit)   Popsicles (without fruit or cream)   Italian ices   Juice without pulp: apple, white, grape   You may use salt, pepper, and sugar    Do NOT drink:   Milk or cream   Soy milk, almond milk, coconut milk, or other non-dairy drinks and   creamers   Milkshakes or smoothies   Tomato juice   Orange juice   Grapefruit juice   Cream soups or any other than broth         Clear Liquid Diet:  Do NOT eat any solid food.  Do NOT eat or suck on mints or candy.  Do NOT chew gum.  Do NOT drink thick liquids like milk or juice with pulp in it.  Do NOT add milk, cream, or anything like soy milk or almond milk to coffee or tea.

## 2024-01-09 NOTE — TELEPHONE ENCOUNTER
Date of Office Visit:  2024  Encounter Provider:  Venkatesh Friend MD  Place of Service:  [facility]  Patient Name: Ely Coats  :  1945    To Whom it May Concern:    Ms Lizzette Coats has paroxysmal atrial fibrillation; she does not have CAD or CHF. She is at low risk of major adverse CV events with surgery.    Current perioperative guidelines recommend holding apixaban for four doses prior to surgery and recommends holding six doses prior to neurological procedures and  procedures.  Holding apixaban for longer than the recommended duration increases the risk of cardioembolic phenomena.    Please contact our office with any questions or concerns. As always, it has been a pleasure to participate in your patient's care.    Venkatesh Friend MD  [facility]

## 2024-01-09 NOTE — TELEPHONE ENCOUNTER
I updated Doreen that clearance has been provided.    Thank you,    Holly MEDINA RN  Triage [facility]  01/09/24 10:03 EST

## 2024-01-10 NOTE — TELEPHONE ENCOUNTER
SPOKE WITH PATIENT, LET HER KNOW THAT PER DR. LINK SHE MAY HOLD ELIQUIS FOR 4 DOSES OR 2 DAYS. PATIENT WILL TAKE HER LAST DOSE ON 01/14/24 IN THE PM. SHE WILL HOLD STARTING 01/15/24. PATIENT VOICED UNDERSTANDING TO THE ABOVE.

## 2024-01-11 ENCOUNTER — ANESTHESIA EVENT (OUTPATIENT)
Dept: PERIOP | Facility: HOSPITAL | Age: 79
End: 2024-01-11
Payer: MEDICARE

## 2024-01-17 ENCOUNTER — ANESTHESIA (OUTPATIENT)
Dept: PERIOP | Facility: HOSPITAL | Age: 79
End: 2024-01-17
Payer: MEDICARE

## 2024-01-17 ENCOUNTER — ANESTHESIA EVENT CONVERTED (OUTPATIENT)
Dept: ANESTHESIOLOGY | Facility: HOSPITAL | Age: 79
End: 2024-01-17
Payer: MEDICARE

## 2024-01-17 ENCOUNTER — HOSPITAL ENCOUNTER (OUTPATIENT)
Facility: HOSPITAL | Age: 79
Discharge: HOME HEALTH | End: 2024-01-20
Attending: ORTHOPAEDIC SURGERY | Admitting: ORTHOPAEDIC SURGERY
Payer: MEDICARE

## 2024-01-17 ENCOUNTER — APPOINTMENT (OUTPATIENT)
Dept: GENERAL RADIOLOGY | Facility: HOSPITAL | Age: 79
End: 2024-01-17
Payer: MEDICARE

## 2024-01-17 DIAGNOSIS — M17.11 PRIMARY OSTEOARTHRITIS OF RIGHT KNEE: ICD-10-CM

## 2024-01-17 DIAGNOSIS — Z78.9 DECREASED ACTIVITIES OF DAILY LIVING (ADL): ICD-10-CM

## 2024-01-17 DIAGNOSIS — M17.11 PRIMARY LOCALIZED OSTEOARTHRITIS OF RIGHT KNEE: ICD-10-CM

## 2024-01-17 DIAGNOSIS — R26.2 DIFFICULTY IN WALKING: Primary | ICD-10-CM

## 2024-01-17 LAB
GLUCOSE BLDC GLUCOMTR-MCNC: 103 MG/DL (ref 70–99)
GLUCOSE BLDC GLUCOMTR-MCNC: 140 MG/DL (ref 70–99)
GLUCOSE BLDC GLUCOMTR-MCNC: 145 MG/DL (ref 70–99)
GLUCOSE BLDC GLUCOMTR-MCNC: 178 MG/DL (ref 70–99)

## 2024-01-17 PROCEDURE — 63710000001 ACETAMINOPHEN EXTRA STRENGTH 500 MG TABLET: Performed by: ORTHOPAEDIC SURGERY

## 2024-01-17 PROCEDURE — 63710000001 PANTOPRAZOLE 40 MG TABLET DELAYED-RELEASE: Performed by: INTERNAL MEDICINE

## 2024-01-17 PROCEDURE — 20985 CPTR-ASST DIR MS PX: CPT | Performed by: ORTHOPAEDIC SURGERY

## 2024-01-17 PROCEDURE — A9270 NON-COVERED ITEM OR SERVICE: HCPCS | Performed by: INTERNAL MEDICINE

## 2024-01-17 PROCEDURE — 25010000002 ONDANSETRON PER 1 MG: Performed by: ORTHOPAEDIC SURGERY

## 2024-01-17 PROCEDURE — 63710000001 POLYETHYLENE GLYCOL 17 G PACK: Performed by: INTERNAL MEDICINE

## 2024-01-17 PROCEDURE — 25010000002 KETOROLAC TROMETHAMINE PER 15 MG: Performed by: ORTHOPAEDIC SURGERY

## 2024-01-17 PROCEDURE — A9270 NON-COVERED ITEM OR SERVICE: HCPCS | Performed by: NURSE ANESTHETIST, CERTIFIED REGISTERED

## 2024-01-17 PROCEDURE — A9270 NON-COVERED ITEM OR SERVICE: HCPCS | Performed by: ORTHOPAEDIC SURGERY

## 2024-01-17 PROCEDURE — 25810000003 LACTATED RINGERS PER 1000 ML: Performed by: ANESTHESIOLOGY

## 2024-01-17 PROCEDURE — C1713 ANCHOR/SCREW BN/BN,TIS/BN: HCPCS | Performed by: ORTHOPAEDIC SURGERY

## 2024-01-17 PROCEDURE — 25010000002 ROPIVACAINE PER 1 MG: Performed by: ORTHOPAEDIC SURGERY

## 2024-01-17 PROCEDURE — 27447 TOTAL KNEE ARTHROPLASTY: CPT | Performed by: PHYSICIAN ASSISTANT

## 2024-01-17 PROCEDURE — 63710000001 INSULIN LISPRO (HUMAN) PER 5 UNITS: Performed by: INTERNAL MEDICINE

## 2024-01-17 PROCEDURE — 82948 REAGENT STRIP/BLOOD GLUCOSE: CPT

## 2024-01-17 PROCEDURE — 63710000001 CARVEDILOL 6.25 MG TABLET: Performed by: INTERNAL MEDICINE

## 2024-01-17 PROCEDURE — 99204 OFFICE O/P NEW MOD 45 MIN: CPT | Performed by: INTERNAL MEDICINE

## 2024-01-17 PROCEDURE — 25010000002 FENTANYL CITRATE (PF) 50 MCG/ML SOLUTION: Performed by: NURSE ANESTHETIST, CERTIFIED REGISTERED

## 2024-01-17 PROCEDURE — 82948 REAGENT STRIP/BLOOD GLUCOSE: CPT | Performed by: NURSE ANESTHETIST, CERTIFIED REGISTERED

## 2024-01-17 PROCEDURE — 25010000002 ROPIVACAINE PER 1 MG: Performed by: ANESTHESIOLOGY

## 2024-01-17 PROCEDURE — 97161 PT EVAL LOW COMPLEX 20 MIN: CPT

## 2024-01-17 PROCEDURE — 25010000002 MIDAZOLAM PER 1MG: Performed by: ANESTHESIOLOGY

## 2024-01-17 PROCEDURE — 25010000002 DEXAMETHASONE PER 1 MG: Performed by: NURSE ANESTHETIST, CERTIFIED REGISTERED

## 2024-01-17 PROCEDURE — 25010000002 EPINEPHRINE 1 MG/ML SOLUTION: Performed by: ORTHOPAEDIC SURGERY

## 2024-01-17 PROCEDURE — 25010000002 SUGAMMADEX 200 MG/2ML SOLUTION: Performed by: NURSE ANESTHETIST, CERTIFIED REGISTERED

## 2024-01-17 PROCEDURE — 82948 REAGENT STRIP/BLOOD GLUCOSE: CPT | Performed by: ANESTHESIOLOGY

## 2024-01-17 PROCEDURE — 25010000002 MORPHINE PER 10 MG: Performed by: ORTHOPAEDIC SURGERY

## 2024-01-17 PROCEDURE — 94761 N-INVAS EAR/PLS OXIMETRY MLT: CPT

## 2024-01-17 PROCEDURE — 63710000001 HYDROCODONE-ACETAMINOPHEN 7.5-325 MG TABLET: Performed by: ORTHOPAEDIC SURGERY

## 2024-01-17 PROCEDURE — 25010000002 HYDROMORPHONE 1 MG/ML SOLUTION: Performed by: NURSE ANESTHETIST, CERTIFIED REGISTERED

## 2024-01-17 PROCEDURE — 27447 TOTAL KNEE ARTHROPLASTY: CPT | Performed by: ORTHOPAEDIC SURGERY

## 2024-01-17 PROCEDURE — 63710000001 ACETAMINOPHEN EXTRA STRENGTH 500 MG TABLET: Performed by: ANESTHESIOLOGY

## 2024-01-17 PROCEDURE — C1776 JOINT DEVICE (IMPLANTABLE): HCPCS | Performed by: ORTHOPAEDIC SURGERY

## 2024-01-17 PROCEDURE — 25010000002 CEFAZOLIN IN DEXTROSE 2-4 GM/100ML-% SOLUTION: Performed by: ORTHOPAEDIC SURGERY

## 2024-01-17 PROCEDURE — 25810000003 SODIUM CHLORIDE 0.9 % SOLUTION: Performed by: ORTHOPAEDIC SURGERY

## 2024-01-17 PROCEDURE — 73560 X-RAY EXAM OF KNEE 1 OR 2: CPT

## 2024-01-17 PROCEDURE — 63710000001 DILTIAZEM CD 240 MG CAPSULE SUSTAINED-RELEASE 24 HR: Performed by: INTERNAL MEDICINE

## 2024-01-17 PROCEDURE — 63710000001 SENNOSIDES-DOCUSATE 8.6-50 MG TABLET: Performed by: INTERNAL MEDICINE

## 2024-01-17 PROCEDURE — 94799 UNLISTED PULMONARY SVC/PX: CPT

## 2024-01-17 PROCEDURE — 25010000002 CEFAZOLIN IN DEXTROSE 2000 MG/ 100 ML SOLUTION: Performed by: ORTHOPAEDIC SURGERY

## 2024-01-17 PROCEDURE — 25010000002 PROPOFOL 10 MG/ML EMULSION: Performed by: NURSE ANESTHETIST, CERTIFIED REGISTERED

## 2024-01-17 PROCEDURE — 63710000001 OXYCODONE 5 MG TABLET: Performed by: NURSE ANESTHETIST, CERTIFIED REGISTERED

## 2024-01-17 PROCEDURE — 25010000002 ONDANSETRON PER 1 MG: Performed by: NURSE ANESTHETIST, CERTIFIED REGISTERED

## 2024-01-17 PROCEDURE — A9270 NON-COVERED ITEM OR SERVICE: HCPCS | Performed by: ANESTHESIOLOGY

## 2024-01-17 DEVICE — STEM TIB/KN PERSONA CMT 5D SZC RT: Type: IMPLANTABLE DEVICE | Site: KNEE | Status: FUNCTIONAL

## 2024-01-17 DEVICE — COMP FEM/KN PERSONA CR CMT COCR STD SZ7 RT: Type: IMPLANTABLE DEVICE | Site: KNEE | Status: FUNCTIONAL

## 2024-01-17 DEVICE — CMT BONE PALACOS R HI/VISC 1X40: Type: IMPLANTABLE DEVICE | Site: KNEE | Status: FUNCTIONAL

## 2024-01-17 DEVICE — ART/SRF KN PERSONA/VE MC EF 6TO7 12MM RT: Type: IMPLANTABLE DEVICE | Site: KNEE | Status: FUNCTIONAL

## 2024-01-17 DEVICE — IMPLANTABLE DEVICE: Type: IMPLANTABLE DEVICE | Site: KNEE | Status: FUNCTIONAL

## 2024-01-17 DEVICE — CAP TOTL KN CMT PRIMARY W/ROSA: Type: IMPLANTABLE DEVICE | Site: KNEE | Status: FUNCTIONAL

## 2024-01-17 RX ORDER — LOSARTAN POTASSIUM 25 MG/1
25 TABLET ORAL DAILY
Status: DISCONTINUED | OUTPATIENT
Start: 2024-01-17 | End: 2024-01-20 | Stop reason: HOSPADM

## 2024-01-17 RX ORDER — DEXTROSE MONOHYDRATE 25 G/50ML
25 INJECTION, SOLUTION INTRAVENOUS
Status: DISCONTINUED | OUTPATIENT
Start: 2024-01-17 | End: 2024-01-20 | Stop reason: HOSPADM

## 2024-01-17 RX ORDER — ACETAMINOPHEN 500 MG
1000 TABLET ORAL ONCE
Status: COMPLETED | OUTPATIENT
Start: 2024-01-17 | End: 2024-01-17

## 2024-01-17 RX ORDER — OXYCODONE HYDROCHLORIDE 5 MG/1
5 TABLET ORAL
Status: DISCONTINUED | OUTPATIENT
Start: 2024-01-17 | End: 2024-01-17 | Stop reason: HOSPADM

## 2024-01-17 RX ORDER — SODIUM CHLORIDE 9 MG/ML
100 INJECTION, SOLUTION INTRAVENOUS CONTINUOUS
Status: ACTIVE | OUTPATIENT
Start: 2024-01-17 | End: 2024-01-18

## 2024-01-17 RX ORDER — DEXAMETHASONE SODIUM PHOSPHATE 4 MG/ML
INJECTION, SOLUTION INTRA-ARTICULAR; INTRALESIONAL; INTRAMUSCULAR; INTRAVENOUS; SOFT TISSUE AS NEEDED
Status: DISCONTINUED | OUTPATIENT
Start: 2024-01-17 | End: 2024-01-17 | Stop reason: SURG

## 2024-01-17 RX ORDER — CEFAZOLIN SODIUM 2 G/100ML
2000 INJECTION, SOLUTION INTRAVENOUS EVERY 8 HOURS
Qty: 200 ML | Refills: 0 | Status: COMPLETED | OUTPATIENT
Start: 2024-01-17 | End: 2024-01-18

## 2024-01-17 RX ORDER — CARVEDILOL 6.25 MG/1
6.25 TABLET ORAL 2 TIMES DAILY WITH MEALS
Status: DISCONTINUED | OUTPATIENT
Start: 2024-01-17 | End: 2024-01-18

## 2024-01-17 RX ORDER — ROPIVACAINE HYDROCHLORIDE 5 MG/ML
INJECTION, SOLUTION EPIDURAL; INFILTRATION; PERINEURAL
Status: COMPLETED | OUTPATIENT
Start: 2024-01-17 | End: 2024-01-17

## 2024-01-17 RX ORDER — NICOTINE POLACRILEX 4 MG
15 LOZENGE BUCCAL
Status: DISCONTINUED | OUTPATIENT
Start: 2024-01-17 | End: 2024-01-20 | Stop reason: HOSPADM

## 2024-01-17 RX ORDER — SODIUM CHLORIDE, SODIUM LACTATE, POTASSIUM CHLORIDE, CALCIUM CHLORIDE 600; 310; 30; 20 MG/100ML; MG/100ML; MG/100ML; MG/100ML
9 INJECTION, SOLUTION INTRAVENOUS CONTINUOUS PRN
Status: DISCONTINUED | OUTPATIENT
Start: 2024-01-17 | End: 2024-01-20 | Stop reason: HOSPADM

## 2024-01-17 RX ORDER — FUROSEMIDE 20 MG/1
20 TABLET ORAL DAILY
Status: DISCONTINUED | OUTPATIENT
Start: 2024-01-17 | End: 2024-01-20 | Stop reason: HOSPADM

## 2024-01-17 RX ORDER — PHENYLEPHRINE HCL IN 0.9% NACL 1 MG/10 ML
SYRINGE (ML) INTRAVENOUS AS NEEDED
Status: DISCONTINUED | OUTPATIENT
Start: 2024-01-17 | End: 2024-01-17 | Stop reason: SURG

## 2024-01-17 RX ORDER — TRANEXAMIC ACID 10 MG/ML
1000 INJECTION, SOLUTION INTRAVENOUS ONCE
Status: COMPLETED | OUTPATIENT
Start: 2024-01-17 | End: 2024-01-17

## 2024-01-17 RX ORDER — IBUPROFEN 600 MG/1
1 TABLET ORAL
Status: DISCONTINUED | OUTPATIENT
Start: 2024-01-17 | End: 2024-01-20 | Stop reason: HOSPADM

## 2024-01-17 RX ORDER — PROMETHAZINE HYDROCHLORIDE 25 MG/1
25 SUPPOSITORY RECTAL ONCE AS NEEDED
Status: DISCONTINUED | OUTPATIENT
Start: 2024-01-17 | End: 2024-01-17 | Stop reason: HOSPADM

## 2024-01-17 RX ORDER — PROMETHAZINE HYDROCHLORIDE 12.5 MG/1
25 TABLET ORAL ONCE AS NEEDED
Status: DISCONTINUED | OUTPATIENT
Start: 2024-01-17 | End: 2024-01-17 | Stop reason: HOSPADM

## 2024-01-17 RX ORDER — HYDROCODONE BITARTRATE AND ACETAMINOPHEN 7.5; 325 MG/1; MG/1
2 TABLET ORAL EVERY 4 HOURS PRN
Status: DISCONTINUED | OUTPATIENT
Start: 2024-01-17 | End: 2024-01-19

## 2024-01-17 RX ORDER — ONDANSETRON 2 MG/ML
4 INJECTION INTRAMUSCULAR; INTRAVENOUS EVERY 6 HOURS PRN
Status: DISCONTINUED | OUTPATIENT
Start: 2024-01-17 | End: 2024-01-20 | Stop reason: HOSPADM

## 2024-01-17 RX ORDER — DILTIAZEM HYDROCHLORIDE 240 MG/1
240 CAPSULE, COATED, EXTENDED RELEASE ORAL DAILY
Status: DISCONTINUED | OUTPATIENT
Start: 2024-01-17 | End: 2024-01-20 | Stop reason: HOSPADM

## 2024-01-17 RX ORDER — POLYETHYLENE GLYCOL 3350 17 G/17G
17 POWDER, FOR SOLUTION ORAL 2 TIMES DAILY
Status: DISCONTINUED | OUTPATIENT
Start: 2024-01-17 | End: 2024-01-20 | Stop reason: HOSPADM

## 2024-01-17 RX ORDER — ACETAMINOPHEN 500 MG
1000 TABLET ORAL EVERY 8 HOURS
Status: DISCONTINUED | OUTPATIENT
Start: 2024-01-17 | End: 2024-01-20 | Stop reason: HOSPADM

## 2024-01-17 RX ORDER — LIDOCAINE HYDROCHLORIDE 20 MG/ML
INJECTION, SOLUTION EPIDURAL; INFILTRATION; INTRACAUDAL; PERINEURAL AS NEEDED
Status: DISCONTINUED | OUTPATIENT
Start: 2024-01-17 | End: 2024-01-17 | Stop reason: SURG

## 2024-01-17 RX ORDER — FENTANYL CITRATE 50 UG/ML
INJECTION, SOLUTION INTRAMUSCULAR; INTRAVENOUS AS NEEDED
Status: DISCONTINUED | OUTPATIENT
Start: 2024-01-17 | End: 2024-01-17 | Stop reason: SURG

## 2024-01-17 RX ORDER — ONDANSETRON 2 MG/ML
4 INJECTION INTRAMUSCULAR; INTRAVENOUS ONCE AS NEEDED
Status: DISCONTINUED | OUTPATIENT
Start: 2024-01-17 | End: 2024-01-17 | Stop reason: HOSPADM

## 2024-01-17 RX ORDER — PROPOFOL 10 MG/ML
VIAL (ML) INTRAVENOUS AS NEEDED
Status: DISCONTINUED | OUTPATIENT
Start: 2024-01-17 | End: 2024-01-17 | Stop reason: SURG

## 2024-01-17 RX ORDER — PANTOPRAZOLE SODIUM 40 MG/1
40 TABLET, DELAYED RELEASE ORAL EVERY MORNING
Status: DISCONTINUED | OUTPATIENT
Start: 2024-01-17 | End: 2024-01-20 | Stop reason: HOSPADM

## 2024-01-17 RX ORDER — ROCURONIUM BROMIDE 10 MG/ML
INJECTION, SOLUTION INTRAVENOUS AS NEEDED
Status: DISCONTINUED | OUTPATIENT
Start: 2024-01-17 | End: 2024-01-17 | Stop reason: SURG

## 2024-01-17 RX ORDER — INSULIN LISPRO 100 [IU]/ML
2-7 INJECTION, SOLUTION INTRAVENOUS; SUBCUTANEOUS
Status: DISCONTINUED | OUTPATIENT
Start: 2024-01-17 | End: 2024-01-20 | Stop reason: HOSPADM

## 2024-01-17 RX ORDER — AMOXICILLIN 250 MG
1 CAPSULE ORAL 2 TIMES DAILY
Status: DISCONTINUED | OUTPATIENT
Start: 2024-01-17 | End: 2024-01-20 | Stop reason: HOSPADM

## 2024-01-17 RX ORDER — NALOXONE HCL 0.4 MG/ML
0.4 VIAL (ML) INJECTION
Status: DISCONTINUED | OUTPATIENT
Start: 2024-01-17 | End: 2024-01-20 | Stop reason: HOSPADM

## 2024-01-17 RX ORDER — MAGNESIUM HYDROXIDE 1200 MG/15ML
LIQUID ORAL AS NEEDED
Status: DISCONTINUED | OUTPATIENT
Start: 2024-01-17 | End: 2024-01-17 | Stop reason: HOSPADM

## 2024-01-17 RX ORDER — MIDAZOLAM HYDROCHLORIDE 2 MG/2ML
2 INJECTION, SOLUTION INTRAMUSCULAR; INTRAVENOUS ONCE
Status: COMPLETED | OUTPATIENT
Start: 2024-01-17 | End: 2024-01-17

## 2024-01-17 RX ORDER — HYDROCODONE BITARTRATE AND ACETAMINOPHEN 7.5; 325 MG/1; MG/1
1 TABLET ORAL EVERY 4 HOURS PRN
Status: DISCONTINUED | OUTPATIENT
Start: 2024-01-17 | End: 2024-01-19

## 2024-01-17 RX ORDER — ONDANSETRON 2 MG/ML
INJECTION INTRAMUSCULAR; INTRAVENOUS AS NEEDED
Status: DISCONTINUED | OUTPATIENT
Start: 2024-01-17 | End: 2024-01-17 | Stop reason: SURG

## 2024-01-17 RX ORDER — CEFAZOLIN SODIUM 2 G/100ML
2 INJECTION, SOLUTION INTRAVENOUS ONCE
Status: COMPLETED | OUTPATIENT
Start: 2024-01-17 | End: 2024-01-17

## 2024-01-17 RX ORDER — ONDANSETRON 4 MG/1
4 TABLET, ORALLY DISINTEGRATING ORAL EVERY 6 HOURS PRN
Status: DISCONTINUED | OUTPATIENT
Start: 2024-01-17 | End: 2024-01-20 | Stop reason: HOSPADM

## 2024-01-17 RX ORDER — KETOROLAC TROMETHAMINE 15 MG/ML
15 INJECTION, SOLUTION INTRAMUSCULAR; INTRAVENOUS EVERY 6 HOURS
Qty: 4 ML | Refills: 0 | Status: DISCONTINUED | OUTPATIENT
Start: 2024-01-17 | End: 2024-01-17

## 2024-01-17 RX ADMIN — FENTANYL CITRATE 50 MCG: 50 INJECTION, SOLUTION INTRAMUSCULAR; INTRAVENOUS at 09:54

## 2024-01-17 RX ADMIN — SODIUM CHLORIDE 100 ML/HR: 9 INJECTION, SOLUTION INTRAVENOUS at 13:18

## 2024-01-17 RX ADMIN — ROPIVACAINE HYDROCHLORIDE 30 ML: 5 INJECTION, SOLUTION EPIDURAL; INFILTRATION; PERINEURAL at 09:24

## 2024-01-17 RX ADMIN — TRANEXAMIC ACID 1000 MG: 10 INJECTION, SOLUTION INTRAVENOUS at 11:18

## 2024-01-17 RX ADMIN — ACETAMINOPHEN 1000 MG: 500 TABLET ORAL at 08:01

## 2024-01-17 RX ADMIN — SODIUM CHLORIDE 100 ML/HR: 9 INJECTION, SOLUTION INTRAVENOUS at 21:41

## 2024-01-17 RX ADMIN — SODIUM CHLORIDE, POTASSIUM CHLORIDE, SODIUM LACTATE AND CALCIUM CHLORIDE: 600; 310; 30; 20 INJECTION, SOLUTION INTRAVENOUS at 11:16

## 2024-01-17 RX ADMIN — HYDROMORPHONE HYDROCHLORIDE 0.5 MG: 1 INJECTION, SOLUTION INTRAMUSCULAR; INTRAVENOUS; SUBCUTANEOUS at 11:58

## 2024-01-17 RX ADMIN — Medication 100 MCG: at 10:07

## 2024-01-17 RX ADMIN — SODIUM CHLORIDE, POTASSIUM CHLORIDE, SODIUM LACTATE AND CALCIUM CHLORIDE 9 ML/HR: 600; 310; 30; 20 INJECTION, SOLUTION INTRAVENOUS at 07:58

## 2024-01-17 RX ADMIN — POLYETHYLENE GLYCOL 3350 17 G: 17 POWDER, FOR SOLUTION ORAL at 21:39

## 2024-01-17 RX ADMIN — MIDAZOLAM HYDROCHLORIDE 2 MG: 1 INJECTION, SOLUTION INTRAMUSCULAR; INTRAVENOUS at 08:52

## 2024-01-17 RX ADMIN — Medication 100 MCG: at 10:18

## 2024-01-17 RX ADMIN — OXYCODONE 5 MG: 5 TABLET ORAL at 12:05

## 2024-01-17 RX ADMIN — ACETAMINOPHEN 1000 MG: 500 TABLET ORAL at 15:37

## 2024-01-17 RX ADMIN — ROCURONIUM BROMIDE 50 MG: 10 INJECTION, SOLUTION INTRAVENOUS at 09:56

## 2024-01-17 RX ADMIN — LIDOCAINE HYDROCHLORIDE 70 MG: 20 INJECTION, SOLUTION EPIDURAL; INFILTRATION; INTRACAUDAL; PERINEURAL at 09:56

## 2024-01-17 RX ADMIN — FENTANYL CITRATE 50 MCG: 50 INJECTION, SOLUTION INTRAMUSCULAR; INTRAVENOUS at 10:28

## 2024-01-17 RX ADMIN — CARVEDILOL 6.25 MG: 6.25 TABLET, FILM COATED ORAL at 17:12

## 2024-01-17 RX ADMIN — PROPOFOL 30 MG: 10 INJECTION, EMULSION INTRAVENOUS at 11:18

## 2024-01-17 RX ADMIN — ONDANSETRON 4 MG: 2 INJECTION INTRAMUSCULAR; INTRAVENOUS at 18:21

## 2024-01-17 RX ADMIN — HYDROMORPHONE HYDROCHLORIDE 0.5 MG: 1 INJECTION, SOLUTION INTRAMUSCULAR; INTRAVENOUS; SUBCUTANEOUS at 12:15

## 2024-01-17 RX ADMIN — INSULIN LISPRO 2 UNITS: 100 INJECTION, SOLUTION INTRAVENOUS; SUBCUTANEOUS at 21:39

## 2024-01-17 RX ADMIN — Medication 100 MCG: at 10:13

## 2024-01-17 RX ADMIN — PANTOPRAZOLE SODIUM 40 MG: 40 TABLET, DELAYED RELEASE ORAL at 13:39

## 2024-01-17 RX ADMIN — ONDANSETRON 4 MG: 2 INJECTION INTRAMUSCULAR; INTRAVENOUS at 11:07

## 2024-01-17 RX ADMIN — CEFAZOLIN SODIUM 2000 MG: 2 INJECTION, SOLUTION INTRAVENOUS at 17:12

## 2024-01-17 RX ADMIN — PROPOFOL 30 MG: 10 INJECTION, EMULSION INTRAVENOUS at 10:46

## 2024-01-17 RX ADMIN — KETOROLAC TROMETHAMINE 15 MG: 15 INJECTION, SOLUTION INTRAMUSCULAR; INTRAVENOUS at 13:23

## 2024-01-17 RX ADMIN — CEFAZOLIN SODIUM 2 G: 2 INJECTION, SOLUTION INTRAVENOUS at 09:59

## 2024-01-17 RX ADMIN — PROPOFOL 110 MG: 10 INJECTION, EMULSION INTRAVENOUS at 09:56

## 2024-01-17 RX ADMIN — DILTIAZEM HYDROCHLORIDE 240 MG: 240 CAPSULE, COATED, EXTENDED RELEASE ORAL at 13:39

## 2024-01-17 RX ADMIN — HYDROCODONE BITARTRATE AND ACETAMINOPHEN 2 TABLET: 7.5; 325 TABLET ORAL at 22:50

## 2024-01-17 RX ADMIN — TRANEXAMIC ACID 1000 MG: 10 INJECTION, SOLUTION INTRAVENOUS at 08:52

## 2024-01-17 RX ADMIN — SUGAMMADEX 100 MG: 100 INJECTION, SOLUTION INTRAVENOUS at 11:31

## 2024-01-17 RX ADMIN — DEXAMETHASONE SODIUM PHOSPHATE 4 MG: 4 INJECTION, SOLUTION INTRAMUSCULAR; INTRAVENOUS at 09:56

## 2024-01-17 RX ADMIN — DOCUSATE SODIUM 50MG AND SENNOSIDES 8.6MG 1 TABLET: 8.6; 5 TABLET, FILM COATED ORAL at 21:39

## 2024-01-17 NOTE — PLAN OF CARE
Goal Outcome Evaluation:  Plan of Care Reviewed With: patient           Outcome Evaluation: Pt presents with decreased ROM, strength, transfers and ambulation.  Skilled PT services will be required to address these mobility deficits.      Anticipated Discharge Disposition (PT): home with outpatient therapy services

## 2024-01-17 NOTE — H&P
[facility]  Date: [facility]24   Patient Name: Ely Coats  : 1945  MRN: [id number]  Primary Care Physician:  Julius Tijerina MD  Date of admission: 2024    Chief complaint: joint pain     HPI: 78 y.o. female who presents for elective joint surgery. Patient has tried non-operative/conservative measures but symptoms have progressed and patient ultimately opted for surgical intervention.    Apart from joint pain they have been in their usual state of health recently without acute changes in their other medications.  No current fevers, chills, cough, nausea or vomiting.  Patient had some mild hypoxia postoperatively, placed on oxygen, doesn't use any at baseline, denies any acute soa currently.      Pt has held her apixaban preoperatively for afib as directed.      Past Medical History:  Active Ambulatory Problems     Diagnosis Date Noted    Bilateral primary osteoarthritis of knee 2021    Pain in both knees 2021    Essential hypertension     Paroxysmal atrial fibrillation     S/P ERCP 2022    Hyperlipidemia     Right hip pain 2023    Greater trochanteric bursitis of right hip 2023    Primary localized osteoarthritis of right knee 2023    Primary osteoarthritis of right knee 2023     Resolved Ambulatory Problems     Diagnosis Date Noted    Gallstone pancreatitis 2022    Elevated liver enzymes 2022    Epigastric pain 2022    Hypokalemia 2022    Encounter for removal of biliary stent 2022    Calculus of gallbladder with acute on chronic cholecystitis with obstruction 2022    Wound infection after surgery 2022     Past Medical History:   Diagnosis Date    Atrial fibrillation with RVR     Cancer     Colon polyp     Contusion of face     Diabetes mellitus     Fall     Gallstones     Kidney stone     Osteoarthritis     Osteopenia     Renal insufficiency     Snoring        Past  Surgical History:   Procedure Laterality Date    BLEPHAROPLASTY Bilateral     CARDIAC CATHETERIZATION Left 2011    [facility], Dr. Cooper Ramires, NO INTERVENTION    CHOLECYSTECTOMY N/A 5/4/2022    Procedure: CHOLECYSTECTOMY LAPAROSCOPIC;  Surgeon: Josue Jernigan MD;  Location: formerly [facility] MAIN OR;  Service: General;  Laterality: N/A;    COLONOSCOPY  2014    negative    COLONOSCOPY  2011    polyp-repeat 2014    CYSTOSCOPY  09/2016    negative- Dr. Murphy per patient urethral polyp noted    ERCP N/A 04/20/2022    Procedure: ENDOSCOPIC RETROGRADE CHOLANGIOPANCREATOGRAPHY WITH SPHINCTEROTOMY, BALLOON SWEEP, COMMON BILE DUCT STENT PLACED, PANCREATIC DUCT STENT PLACED;  Surgeon: Gurvinder Perry MD;  Location: formerly [facility] ENDOSCOPY;  Service: Gastroenterology;  Laterality: N/A;  BILE DUCT STONES    ERCP N/A 5/10/2022    Procedure: ENDOSCOPIC RETROGRADE CHOLANGIOPANCREATOGRAPHY WITH STENT REMOVAL X2, SPHINCTEROTOMY, 12-15MM BALLOON SWEEP WITH BILE DUCT STONE REMOVAL;  Surgeon: Gurvinder Perry MD;  Location: formerly [facility] ENDOSCOPY;  Service: Gastroenterology;  Laterality: N/A;  BILE DUCT STONES    SKIN CANCER EXCISION  04/2016    basal cell on back    TUBAL ABDOMINAL LIGATION      WRIST FRACTURE SURGERY Left     ORIF        Family History   Problem Relation Age of Onset    Heart disease Mother         afib    Lung disease Mother         interstitial lung disease    Stroke Father         hemmorhagic stroke-aneurysm    Diabetes Brother     Malig Hyperthermia Neg Hx        Social History     Socioeconomic History    Marital status:    Tobacco Use    Smoking status: Never    Smokeless tobacco: Never   Vaping Use    Vaping Use: Never used   Substance and Sexual Activity    Alcohol use: Never    Drug use: Never    Sexual activity: Defer       Home Medications:  Magnesium, apixaban, carvedilol, cholecalciferol, cyanocobalamin, dilTIAZem CD, furosemide, glucose blood, losartan, metFORMIN, multivitamin, and  pantoprazole    Allergies:  Allergies   Allergen Reactions    Penicillins Rash     Mild rash- pt states can take amoxicillin or keflex      Statins Other (See Comments)     Hair loss crestor         Objective     Vitals:   Temp:  [97.1 °F (36.2 °C)-99.3 °F (37.4 °C)] 98.4 °F (36.9 °C)  Heart Rate:  [57-76] 60  Resp:  [14-18] 16  BP: (133-160)/(60-80) 139/63  Flow (L/min):  [2-3] 2    Physical Exam:  General: NAD, resting in chair at bedside  Resp: slightly diminished in bases, no wheezes, on nc  CV: RRR no mrg  GI: abdomen is soft, NT, ND, +bs  Skin: postoperative wound dressed, distal sensation intact     Result Review      Vital signs, labs, home medications, and any relevant imaging reviewed.      acetaminophen, 1,000 mg, Oral, Q8H  carvedilol, 6.25 mg, Oral, BID With Meals  ceFAZolin, 2,000 mg, Intravenous, Q8H  dilTIAZem CD, 240 mg, Oral, Daily  [Held by provider] furosemide, 20 mg, Oral, Daily  insulin lispro, 2-7 Units, Subcutaneous, 4x Daily AC & at Bedtime  [Held by provider] losartan, 25 mg, Oral, Daily  pantoprazole, 40 mg, Oral, Daily  polyethylene glycol, 17 g, Oral, BID  senna-docusate sodium, 1 tablet, Oral, BID          dextrose    dextrose    glucagon (human recombinant)    HYDROcodone-acetaminophen    HYDROcodone-acetaminophen    lactated ringers    magnesium hydroxide    Morphine **AND** naloxone    ondansetron ODT **OR** ondansetron    XR Knee 1 or 2 View Right    Result Date: 1/17/2024   Status post total knee arthroplasty in near anatomic alignment, with no evidence of immediate complication.      JUDD ACUNA MD       Electronically Signed and Approved By: JUDD ACUNA MD on 1/17/2024 at 12:18              Preop labs notable for gfr 60, gluc 108, A1c 6.5, cr 0.9 inr 1.1, wbc 5 hb 12.3 plt 268   Postop rght knee film personally reviewed, no acute fracture noted     Assessment and Plan     Assessment and Plan:   Severe osteoarthritis s/p right total knee surgery on  1/17/2024  Postoperative Hypoxia and atelectasis  DM2, on metformin outpt, A1c 6.5  HTN  Afib, on apixaban outpt  Borderline reduced GFR outpatient   GERD    Add scheduled and prn bowel regimen   Home apixaban on hold, AC as per orthopedic surgery, resume when ok per ortho - will discuss further for timing for on discharge   Resume home coreg and diltiazem, monitor bp, hold losartan and lasix for now, reassess in am likely resume at that time   Give iv fluids  Dc scheduled toradol given age and outpt reduced gfr,   Resp hygiene, wean o2 as able, requiring 3 liters postop, unclear degree of desat, if not off of o2 by am will check cxr and evaluate further  SSI for diabetes, monitor glucose   perioperative abx and dvt ppx and pain medications as per orthopedic surgery  Monitor for sedation while on opiates   cont to monitor vitals/o2  pt/ot  continue postoperative wound care   F/u am hnh, bmp, mg, p      Diet: as tolerated

## 2024-01-17 NOTE — OP NOTE
TOTAL KNEE ARTHROPLASTY WITH CLARISSA ROBOT  Procedure Report    Patient Name:  Ely Coats  YOB: 1945    Date of Surgery:  1/17/2024     Indications:  The patient has had persistent knee pain and x-rays reveal advanced osteoarthritis.  They wish to proceed with operative treatment.  Risks and benefits of surgery were discussed.  Risk of bleeding, infection, damage to neurovascular structures, heart attack, stroke, DVT/PE, anesthesia complications including death, stiffness, instability, periprosthetic fracture, deep infection, among others were discussed.  Informed consent was obtained and they wish to proceed.      Pre-op Diagnosis:   Primary osteoarthritis of right knee [M17.11]       Post-Op Diagnosis Codes:     * Primary osteoarthritis of right knee [M17.11]    Procedure/CPT® Codes:      Procedure(s):  RIGHT TOTAL KNEE ARTHROPLASTY WITH CLARISSA ROBOT    Staff:  Surgeon(s):  Geraldo Gann MD    Assistant: Katt Mejias RN; Stephan Greco PA    Surgical Approach: Knee Medial Parapatellar        Anesthesia: General with Block    Estimated Blood Loss: 75 mL    Implants:    Implant Name Type Inv. Item Serial No.  Lot No. LRB No. Used Action   CMT BONE PALACOS R HI/VISC 1X40 - [id number] Implant CMT BONE PALACOS R HI/VISC 1X40  [facility] [id number] Right 2 Implanted   PAT KN PERSONA ALLPOLY CMT 8.5X32MM - [id number] Implant PAT KN PERSONA ALLPOLY CMT 8.5X32MM  [employer] [id number] Right 1 Implanted   COMP FEM/KN PERSONA CR CMT COCR STD SZ7 RT - [id number] Implant COMP FEM/KN PERSONA CR CMT COCR STD SZ7 RT  [employer] [id number] Right 1 Implanted   STEM TIB/KN PERSONA CMT 5D SZC RT - [id number] Implant STEM TIB/KN PERSONA CMT 5D SZC RT  [employer] [id number] Right 1 Implanted   ART/SRF KN PERSONA PS EF MC 6TO7 12MM RT - [id number] Implant ART/SRF KN PERSONA PS EF MC 6TO7 12MM RT  [employer] [id number] Right 1 Implanted       Specimen:          None         Findings: knee osteoarthritis    Complications: None    Description of Procedure: The patient was brought to the operating room and placed supine on the OR table.  General anesthesia was given.  Preoperatively, the patient received an adductor canal nerve block. The patient was placed supine on the OR table.  All bony prominences were padded. The tourniquet was placed on the thigh. The affected lower extremity was prepped and draped in the usual sterile fashion. Preoperative antibiotic was given. Tranexamic acid intravenously was given at the beginning and the end of the surgery.  At this point, an Esmarch was used to exsanguinate the limb and the tourniquet was inflated. A longitudinal incision was made over the knee and a medial parapatellar arthrotomy was performed.  Appropriate releases were performed to the proximal medial tibia. The pre-patellar fat pad was excised.  The patella was subluxed laterally and the knee was examined. There was tricompartmental wear and osteophyte formation.  The medial and lateral menisci were removed.  Osteophytes of  the femur were debrided.     At this point 2 threaded guidepins were placed at the distal femur and the tracking sensor for the femur for the Rosio robot were placed.  2 percutaneous stab incisions were made in the proximal tibial shaft and 2 guidepins were placed for the tibial tracking censor was placed.  We then registered the mechanical axis and femoral and tibial landmarks for the Rosio Robot.  We then assessed the flexion extension gaps and the flexion and extension deformity.  We made our surgical plan and then executed the distal femoral cut, the 4-in-1 femoral cut and the tibial cut. The patella was then everted, resected, and sized.  Drill holes for the patella was made.  At this point the spacer block was placed in flexion extension and fit well.     We then placed the knee in flexion where laminar spreaders were used to open the flexion gaps.  The menisci  were removed.  Osteophytes were removed from the posterior femur. The posterior capsule was injected with anesthetic cocktail.  At this point, the appropriately sized tibial tray was chosen.  This was pinned into place in line with the medial one third of the tibial tubercle. We then placed the trial femur. The trial insert was placed and the knee was brought to full extension. It was stable to varus and valgus stress.   The knee was then trialed with range of motion again. The femoral drill holes were made. The tibia was finished with the drill and the punch.  The components were removed. The knee was irrigated and dried. The cement was mixed and the tibia and femur were cemented into place.  The medial congruent spacer was then inserted.  The patella was cemented into place. The knee was irrigated with Irrisept and normal saline Pulsavac lavage.  The medial congruent polyethylene was inserted. The knee was stable to varus and valgus stress. There was good balance to the ligaments medially and laterally. There was good flexion with a stable patella. At this point, the tourniquet was released.  There was minimal bleeding controlled with electrocautery.   The arthrotomy was closed with #1 Vicryl at 30 degrees of flexion, the subcutaneous tissues with 2-0 Vicryl, and the skin with staples.  The percutaneous stab incisions on the tibia were closed with 3-0 nylon in horizontal mattress fashion.  These incisions were covered with Xeroform, 4 x 4, Tegaderm.  An Aquacel dressing was placed and an Ace wrap was placed. Patient awoke from anesthesia in stable condition. There were no complications. All counts were correct.       Assistant: Katt Mejias RN; Stephan Greco PA  was responsible for performing the following activities: Retraction, Suction, Irrigation, and Placing Dressing and their skilled assistance was necessary for the success of this case.    Geraldo Gann MD     Date: 1/17/2024  Time:  11:42 EST

## 2024-01-17 NOTE — H&P
[facility]   HISTORY AND PHYSICAL    Patient Name: Ely Coats  : 1945  MRN: [id number]  Primary Care Physician:  Julius Tijerina MD  Date of admission: (Not on file)    Subjective   Subjective     Chief Complaint: Right knee pain    History of Present Illness: The patient is an 78-year-old female with right knee pain.  X-rays reveal advanced degenerative changes to the right knee.  The patient has persisted with pain and disability despite attempted nonoperative management.  She wishes to undergo knee replacement surgery.    Review of Systems : Negative except for those mentioned in the history of present illness    Personal History     Past Medical History:   Diagnosis Date    Atrial fibrillation with RVR     FOLLOWS W/LINK, NO RECENT ISSUES, NO CP    Calculus of gallbladder with acute on chronic cholecystitis with obstruction 2022    Cancer     SKIN CANCER REMOVED    Colon polyp     Contusion of face     Diabetes mellitus     BS IN  THE A.M. 120 ON AVERAGE    Essential hypertension     CONT W/MEDS    Fall     Gallstones     Hyperlipidemia     Kidney stone     Osteoarthritis     Osteopenia     Renal insufficiency     due to nsaids (2017 states labs wnl) NO CURRENT PROBLEMS    Snoring     NO CPAP    Wound infection after surgery 2022       Past Surgical History:   Procedure Laterality Date    BLEPHAROPLASTY Bilateral     CARDIAC CATHETERIZATION Left     [facility], Dr. Cooper Ramires, NO INTERVENTION    CHOLECYSTECTOMY N/A 2022    Procedure: CHOLECYSTECTOMY LAPAROSCOPIC;  Surgeon: Josue Jernigan MD;  Location: [facility] OR;  Service: General;  Laterality: N/A;    COLONOSCOPY      negative    COLONOSCOPY      polyp-repeat     CYSTOSCOPY  2016    negative- Dr. Murphy per patient urethral polyp noted    ERCP N/A 2022    Procedure: ENDOSCOPIC RETROGRADE CHOLANGIOPANCREATOGRAPHY WITH SPHINCTEROTOMY, BALLOON SWEEP, COMMON BILE DUCT  STENT PLACED, PANCREATIC DUCT STENT PLACED;  Surgeon: Gurvinder Perry MD;  Location: [facility] ENDOSCOPY;  Service: Gastroenterology;  Laterality: N/A;  BILE DUCT STONES    ERCP N/A 5/10/2022    Procedure: ENDOSCOPIC RETROGRADE CHOLANGIOPANCREATOGRAPHY WITH STENT REMOVAL X2, SPHINCTEROTOMY, 12-15MM BALLOON SWEEP WITH BILE DUCT STONE REMOVAL;  Surgeon: Gurvinder Perry MD;  Location: [facility] ENDOSCOPY;  Service: Gastroenterology;  Laterality: N/A;  BILE DUCT STONES    SKIN CANCER EXCISION  04/2016    basal cell on back    TUBAL ABDOMINAL LIGATION      WRIST FRACTURE SURGERY Left     ORIF       Family History: family history includes Diabetes in her brother; Heart disease in her mother; Lung disease in her mother; Stroke in her father. Otherwise pertinent FHx was reviewed and not pertinent to current issue.    Social History:  reports that she has never smoked. She has never used smokeless tobacco. She reports that she does not drink alcohol and does not use drugs.    Home Medications:  Magnesium, apixaban, carvedilol, cholecalciferol, cyanocobalamin, dilTIAZem CD, furosemide, glucose blood, losartan, metFORMIN, multivitamin, and pantoprazole    Allergies:  Allergies   Allergen Reactions    Penicillins Rash     Mild rash- pt states can take amoxicillin or keflex      Statins Other (See Comments)     Hair loss crestor       Objective    Objective     Vitals:        Physical Exam  General: No apparent distress, alert and oriented x 3  HEENT: Normocephalic/atraumatic  Neck: Supple  Cardiovascular: Regular heart rate  Chest: Unlabored breathing  Abdomen: Soft, nontender and nondistended  Musculoskeletal: Neurovascular intact extremity.  Positive pulses.  Tender to palpation to knee.  Stability intact.  Ambulates with antalgic gait.  Reduced knee range of motion.  Neurological: Grossly intact    Result Review    Result Review:  I have personally reviewed the results from the time of this admission to 1/16/2024 21:43  EST and agree with these findings:  [x]  Laboratory list / accordion  []  Microbiology  [x]  Radiology  []  EKG/Telemetry   []  Cardiology/Vascular   []  Pathology  []  Old records  []  Other:  Most notable findings include: Right knee osteoarthritis      Assessment & Plan   Assessment / Plan     Brief Patient Summary:  Ely Coats is a 78 y.o. female who has right knee osteoarthritis    Active Hospital Problems:  Active Hospital Problems    Diagnosis     **Primary osteoarthritis of right knee      Plan: I discussed treatment options with the patient.  Operative versus nonoperative treatment was discussed.  Risks and benefits of surgery were discussed.  Informed consent was obtained and the patient wishes to proceed with right total knee arthroplasty.      DVT prophylaxis:  No DVT prophylaxis order currently exists.    CODE STATUS:       Admission Status:  I believe this patient meets outpatient status.    Geraldo Gann MD

## 2024-01-17 NOTE — THERAPY EVALUATION
Acute Care - Physical Therapy Initial Evaluation   [place]     Patient Name: Ely Coats  : 1945  MRN: [id number]  Today's Date: 2024     Admit date: 2024     Referring Physician: Mg Mcdaniel MD     Surgery Date:2024   Procedure(s) (LRB):  RIGHT TOTAL KNEE ARTHROPLASTY WITH CLARISSA ROBOT (Right)          Visit Dx:     ICD-10-CM ICD-9-CM   1. Difficulty in walking  R26.2 719.7   2. Primary osteoarthritis of right knee  M17.11 715.16     Patient Active Problem List   Diagnosis    Bilateral primary osteoarthritis of knee    Pain in both knees    Essential hypertension    Paroxysmal atrial fibrillation    S/P ERCP    Hyperlipidemia    Right hip pain    Greater trochanteric bursitis of right hip    Primary localized osteoarthritis of right knee    Primary osteoarthritis of right knee     Past Medical History:   Diagnosis Date    Atrial fibrillation with RVR     FOLLOWS W/LINK, NO RECENT ISSUES, NO CP    Calculus of gallbladder with acute on chronic cholecystitis with obstruction 2022    Cancer     SKIN CANCER REMOVED    Colon polyp     Contusion of face     Diabetes mellitus     BS IN  THE A.M. 120 ON AVERAGE    Essential hypertension     CONT W/MEDS    Fall     Gallstones     Hyperlipidemia     Kidney stone     Osteoarthritis     Osteopenia     Renal insufficiency     due to nsaids (2017 states labs wnl) NO CURRENT PROBLEMS    Snoring     NO CPAP    Wound infection after surgery 2022     Past Surgical History:   Procedure Laterality Date    BLEPHAROPLASTY Bilateral     CARDIAC CATHETERIZATION Left     [facility], Dr. Cooper Ramires, NO INTERVENTION    CHOLECYSTECTOMY N/A 2022    Procedure: CHOLECYSTECTOMY LAPAROSCOPIC;  Surgeon: Josue Jernigan MD;  Location: [facility] MAIN OR;  Service: General;  Laterality: N/A;    COLONOSCOPY      negative    COLONOSCOPY      polyp-repeat 2014    CYSTOSCOPY  2016    negative- Dr. Murphy per patient  urethral polyp noted    ERCP N/A 04/20/2022    Procedure: ENDOSCOPIC RETROGRADE CHOLANGIOPANCREATOGRAPHY WITH SPHINCTEROTOMY, BALLOON SWEEP, COMMON BILE DUCT STENT PLACED, PANCREATIC DUCT STENT PLACED;  Surgeon: Gurvinder Perry MD;  Location: [facility] ENDOSCOPY;  Service: Gastroenterology;  Laterality: N/A;  BILE DUCT STONES    ERCP N/A 5/10/2022    Procedure: ENDOSCOPIC RETROGRADE CHOLANGIOPANCREATOGRAPHY WITH STENT REMOVAL X2, SPHINCTEROTOMY, 12-15MM BALLOON SWEEP WITH BILE DUCT STONE REMOVAL;  Surgeon: Gurvinder Perry MD;  Location: [facility] ENDOSCOPY;  Service: Gastroenterology;  Laterality: N/A;  BILE DUCT STONES    SKIN CANCER EXCISION  04/2016    basal cell on back    TUBAL ABDOMINAL LIGATION      WRIST FRACTURE SURGERY Left     ORIF     PT Assessment (last 12 hours)       PT Evaluation and Treatment       Row Name 01/17/24 1310          Physical Therapy Time and Intention    Subjective Information complains of;pain  -GRAYSON     Document Type evaluation  -GRAYSON     Mode of Treatment individual therapy;physical therapy  -GRAYSON     Patient Effort good  -GRAYSON     Symptoms Noted During/After Treatment none  -GRAYSON       Row Name 01/17/24 1310          General Information    Patient Observations alert;cooperative;agree to therapy  -GRAYSON     Prior Level of Function independent:;all household mobility;community mobility  -GRAYSON     Equipment Currently Used at Home none  -GRAYSON     Existing Precautions/Restrictions fall;weight bearing  -GRAYSON     Barriers to Rehab none identified  -GRAYSON       Row Name 01/17/24 1310          Living Environment    Current Living Arrangements home  -GRAYSON     People in Home spouse  -GRAYSON       Row Name 01/17/24 1310          Cognition    Orientation Status (Cognition) oriented x 3  -GRAYSON       Row Name 01/17/24 1310          Range of Motion Comprehensive    General Range of Motion lower extremity range of motion deficits identified  R knee 5-70°  -GRAYSON       Row Name 01/17/24 1310          Strength Comprehensive  (MMT)    General Manual Muscle Testing (MMT) Assessment lower extremity strength deficits identified  RLE 3-/5  -GRAYSON       Row Name 01/17/24 1310          Bed Mobility    Bed Mobility bed mobility (all) activities;supine-sit  -GRAYSON     All Activities, [place] (Bed Mobility) minimum assist (75% patient effort)  -GRAYSON     Supine-Sit [place] (Bed Mobility) minimum assist (75% patient effort)  -GRAYSON       Row Name 01/17/24 1310          Transfers    Transfers bed-chair transfer;sit-stand transfer  -GRAYSON       Row Name 01/17/24 1310          Bed-Chair Transfer    Bed-Chair [place] (Transfers) minimum assist (75% patient effort)  -GRAYSON     Assistive Device (Bed-Chair Transfers) walker, front-wheeled  -GRAYSON       Row Name 01/17/24 1310          Sit-Stand Transfer    Sit-Stand [place] (Transfers) minimum assist (75% patient effort)  -GRAYSON     Assistive Device (Sit-Stand Transfers) walker, front-wheeled  -GRAYSON       Row Name 01/17/24 1310          Gait/Stairs (Locomotion)    Gait/Stairs Locomotion gait/ambulation assistive device  -GRAYSON     [place] Level (Gait) minimum assist (75% patient effort)  -GRAYSON     Assistive Device (Gait) walker, front-wheeled  -GRAYSON     Patient was able to Ambulate yes  -GRAYSON     Distance in Feet (Gait) 20  -GRAYSON     Pattern (Gait) step-to  -GRAYSON       Row Name 01/17/24 1310          Safety Issues, Functional Mobility    Impairments Affecting Function (Mobility) balance;pain;range of motion (ROM);strength  -GRAYSON       Row Name 01/17/24 1310          Balance    Balance Assessment standing dynamic balance  -GRAYSON     Dynamic Standing Balance minimal assist  -GRAYSON     Position/Device Used, Standing Balance walker, front-wheeled  -GRAYSON       Row Name             [REMOVED] Wound 05/04/22 1330 abdomen Incision    Wound - Properties Group Placement Date: 05/04/22  -KS Placement Time: 1330  -KS Present on Original Admission: N  -GB Location: abdomen  -KS Primary Wound Type: Incision  -KS, trocar sites for lap marie   Removal Date: 01/17/24  -GB Removal Time: 0749  -GB    Retired Wound - Properties Group Placement Date: 05/04/22  -KS Placement Time: 1330  -KS Present on Original Admission: N  -GB Location: abdomen  -KS Primary Wound Type: Incision  -KS, trocar sites for lap marie  Removal Date: 01/17/24  -GB Removal Time: 0749  -GB    Retired Wound - Properties Group Date first assessed: 05/04/22  -KS Time first assessed: 1330  -KS Present on Original Admission: N  -GB Location: abdomen  -KS Primary Wound Type: Incision  -KS, trocar sites for lap marie  Resolution Date: 01/17/24  -GB Resolution Time: 0749  -GB      Row Name             Wound 01/17/24 1029 Right anterior knee Incision    Wound - Properties Group Placement Date: 01/17/24  -SC Placement Time: 1029  -SC Present on Original Admission: N  -SC Side: Right  -SC Orientation: anterior  -SC Location: knee  -SC Primary Wound Type: Incision  -SC    Retired Wound - Properties Group Placement Date: 01/17/24  -SC Placement Time: 1029  -SC Present on Original Admission: N  -SC Side: Right  -SC Orientation: anterior  -SC Location: knee  -SC Primary Wound Type: Incision  -SC    Retired Wound - Properties Group Date first assessed: 01/17/24  -SC Time first assessed: 1029  -SC Present on Original Admission: N  -SC Side: Right  -SC Location: knee  -SC Primary Wound Type: Incision  -SC      Row Name             Wound 01/17/24 1030 Right proximal leg Puncture    Wound - Properties Group Placement Date: 01/17/24  -SC Placement Time: 1030  -SC Present on Original Admission: N  -SC Side: Right  -SC Orientation: proximal  -SC Location: leg  -SC Primary Wound Type: Puncture  -SC    Retired Wound - Properties Group Placement Date: 01/17/24  -SC Placement Time: 1030  -SC Present on Original Admission: N  -SC Side: Right  -SC Orientation: proximal  -SC Location: leg  -SC Primary Wound Type: Puncture  -SC    Retired Wound - Properties Group Date first assessed: 01/17/24  -SC Time first assessed:  1030  -SC Present on Original Admission: N  -SC Side: Right  -SC Location: leg  -SC Primary Wound Type: Puncture  -SC      Row Name 01/17/24 1310          Plan of Care Review    Plan of Care Reviewed With patient  -GRAYSON     Outcome Evaluation Pt presents with decreased ROM, strength, transfers and ambulation.  Skilled PT services will be required to address these mobility deficits.  -GRAYSON       Row Name 01/17/24 1310          Therapy Assessment/Plan (PT)    Patient/Family Therapy Goals Statement (PT) Pt wants to walk better with less pain  -GRAYSON     Rehab Potential (PT) good, to achieve stated therapy goals  -GRAYSON     Criteria for Skilled Interventions Met (PT) skilled treatment is necessary  -GRAYSON     Therapy Frequency (PT) 2 times/day  -GRAYSON     Predicted Duration of Therapy Intervention (PT) 10 days  -GRAYSON     Problem List (PT) problems related to;balance;mobility;range of motion (ROM);strength;pain  -GRAYSON     Activity Limitations Related to Problem List (PT) unable to ambulate safely;unable to transfer safely  -GRAYSON       Row Name 01/17/24 1310          PT Evaluation Complexity    History, PT Evaluation Complexity no personal factors and/or comorbidities  -GRAYSON     Examination of Body Systems (PT Eval Complexity) total of 4 or more elements  -GRAYSON     Clinical Presentation (PT Evaluation Complexity) stable  -GRAYSON     Clinical Decision Making (PT Evaluation Complexity) low complexity  -GRAYSON     Overall Complexity (PT Evaluation Complexity) low complexity  -GRAYSON       Row Name 01/17/24 1310          Therapy Plan Review/Discharge Plan (PT)    Therapy Plan Review (PT) evaluation/treatment results reviewed;participants voiced agreement with care plan;participants included;patient  -GRAYSON       Row Name 01/17/24 1310          Physical Therapy Goals    Transfer Goal Selection (PT) transfer, PT goal 1  -GRAYSON     Gait Training Goal Selection (PT) gait training, PT goal 1  -GRAYSON     ROM Goal Selection (PT) ROM, PT goal 1  -GRAYSON     Strength Goal Selection  (PT) strength, PT goal 1  -GRAYSON       Row Name 01/17/24 1310          Transfer Goal 1 (PT)    Activity/Assistive Device (Transfer Goal 1, PT) transfers, all  -GRAYSON     [place] Level/Cues Needed (Transfer Goal 1, PT) independent  -GRAYSON     Time Frame (Transfer Goal 1, PT) long term goal (LTG);10 days  -GRAYSON       Row Name 01/17/24 1310          Gait Training Goal 1 (PT)    Activity/Assistive Device (Gait Training Goal 1, PT) gait (walking locomotion);walker, rolling  -GRAYSON     [place] Level (Gait Training Goal 1, PT) independent  -GRAYSON     Distance (Gait Training Goal 1, PT) 300  -GRAYSON     Time Frame (Gait Training Goal 1, PT) long term goal (LTG);10 days  -GRAYSON       Row Name 01/17/24 1310          ROM Goal 1 (PT)    ROM Goal 1 (PT) Pt will demonstrate knee ROM from 0-110° on the affected side.  -GRAYSON     Time Frame (ROM Goal 1, PT) long-term goal (LTG);10 days  -GRAYSON       Row Name 01/17/24 1310          Strength Goal 1 (PT)    Strength Goal 1 (PT) Pt will demonstrate knee extension strength of 5/5 on the affected side.  -GRAYSON     Time Frame (Strength Goal 1, PT) long term goal (LTG);10 days  -GRAYSON               User Key  (r) = Recorded By, (t) = Taken By, (c) = Cosigned By      Initials Name Provider Type    SC Martha De Jesus, RN Registered Nurse    Siddharth Fair, RN Registered Nurse    Addy Arias, PT Physical Therapist    Anna Castaneda RN Registered Nurse                    Physical Therapy Education       Title: PT OT SLP Therapies (Done)       Topic: Physical Therapy (Done)       Point: Mobility training (Done)       Learning Progress Summary             Patient Acceptance, E,TB, VU by GRAYSON at 1/17/2024 1358                         Point: Precautions (Done)       Learning Progress Summary             Patient Acceptance, E,TB, VU by GRAYSON at 1/17/2024 1358                                         User Key       Initials Effective Dates Name Provider Type Discipline    GRAYSON 06/03/21 -  Addy Li, PT  Physical Therapist PT                  PT Recommendation and Plan  Anticipated Discharge Disposition (PT): home with outpatient therapy services  Planned Therapy Interventions (PT): balance training, bed mobility training, gait training, home exercise program, stair training, ROM (range of motion), strengthening, stretching, transfer training  Therapy Frequency (PT): 2 times/day  Plan of Care Reviewed With: patient  Outcome Evaluation: Pt presents with decreased ROM, strength, transfers and ambulation.  Skilled PT services will be required to address these mobility deficits.   Outcome Measures       Row Name 01/17/24 1358 01/17/24 1302          How much help from another person do you currently need...    Turning from your back to your side while in flat bed without using bedrails? 3  -GRAYSON --     Moving from lying on back to sitting on the side of a flat bed without bedrails? 3  -GRAYSON --     Moving to and from a bed to a chair (including a wheelchair)? 3  -GRAYSON --     Standing up from a chair using your arms (e.g., wheelchair, bedside chair)? 3  -GRAYSON --     Climbing 3-5 steps with a railing? 3  -GRAYSON --     To walk in hospital room? 3  -GRAYSON --     AM-[facility] 6 Clicks Score (PT) 18  -GRAYSON --     Highest Level of Mobility Goal 6 --> Walk 10 steps or more  -GRAYSON --        Functional Assessment    Outcome Measure Options -- AM-PAC 6 Clicks Basic Mobility (PT)  -GRAYSON               User Key  (r) = Recorded By, (t) = Taken By, (c) = Cosigned By      Initials Name Provider Type    GRAYSONAddy Duran, PT Physical Therapist                     Time Calculation:    PT Charges       Row Name 01/17/24 1356             Time Calculation    PT Received On 01/17/24  -GRAYSON      PT Goal Re-Cert Due Date 01/26/24  -GRAYSON         Untimed Charges    PT Eval/Re-eval Minutes 20  -GRAYSON         Total Minutes    Untimed Charges Total Minutes 20  -GRAYSON       Total Minutes 20  -GRAYSON                User Key  (r) = Recorded By, (t) = Taken By, (c) = Cosigned By      Initials  Name Provider Type    Addy Arias, PT Physical Therapist                  Therapy Charges for Today       Code Description Service Date Service Provider Modifiers Qty    [id number] HC PT EVAL LOW COMPLEXITY 2 1/17/2024 Addy Li, PT GP 1            PT G-Codes  Outcome Measure Options: AM-PAC 6 Clicks Basic Mobility (PT)  AM-PAC 6 Clicks Score (PT): 18    Addy Li, PT  1/17/2024

## 2024-01-17 NOTE — ANESTHESIA PROCEDURE NOTES
Peripheral Block      Patient reassessed immediately prior to procedure    Patient location during procedure: pre-op  Reason for block: at surgeon's request and post-op pain management  Preanesthetic Checklist  Completed: patient identified, IV checked, site marked, risks and benefits discussed, surgical consent, monitors and equipment checked, pre-op evaluation and timeout performed  Prep:  Pt Position: supine  Sterile barriers:alcohol skin prep, partial drape, cap, washed/disinfected hands, mask and gloves  Prep: ChloraPrep  Patient monitoring: blood pressure monitoring, continuous pulse oximetry and EKG  Procedure    Sedation: yes  Performed under: local infiltration  Guidance:ultrasound guided and nerve stimulator    ULTRASOUND INTERPRETATION.  Using ultrasound guidance a 20 G gauge needle was placed in close proximity to the nerve, at which point, under ultrasound guidance anesthetic was injected in the area of the nerve and spread of the anesthesia was seen on ultrasound in close proximity thereto.  There were no abnormalities seen on ultrasound; a digital image was taken; and the patient tolerated the procedure with no complications. Images:still images obtained, printed/placed on chart    Laterality:right  Block Type:adductor canal block  Injection Technique:single-shot  Needle Type:echogenic  Needle Gauge:20 G (4in)  Resistance on Injection: none    Medications Used: ropivacaine (NAROPIN) 0.5 % injection - Injection   30 mL - 1/17/2024 9:24:00 AM      Post Assessment  Injection Assessment: negative aspiration for heme, no paresthesia on injection and incremental injection  Patient Tolerance:comfortable throughout block  Complications:no  Additional Notes  The block or continuous infusion is requested by the referring physician for management of postoperative pain, or pain related to a procedure. Ultrasound guidance (deemed medically necessary). Painless injection, pt was awake and conversant during the  procedure without complications. Needle and surrounding structures visualized throughout procedure. No adverse reactions or complications seen during this period. Post-procedure image showed no signs of complication, and anatomy was consistent with an uncomplicated nerve blockade.

## 2024-01-17 NOTE — ANESTHESIA POSTPROCEDURE EVALUATION
Patient: Ely Coats    Procedure Summary       Date: 01/17/24 Room / Location: [facility] OR 03 / [facility] MAIN OR    Anesthesia Start: 0951 Anesthesia Stop: 1147    Procedure: RIGHT TOTAL KNEE ARTHROPLASTY WITH CLARISSA ROBOT (Right: Knee) Diagnosis:       Primary osteoarthritis of right knee      (Primary osteoarthritis of right knee [M17.11])    Surgeons: Geraldo Gann MD Provider: Saul Herrera MD    Anesthesia Type: general with block ASA Status: 3            Anesthesia Type: general with block    Vitals  Vitals Value Taken Time   /69 01/17/24 1149   Temp     Pulse 66 01/17/24 1152   Resp     SpO2 100 % 01/17/24 1152   Vitals shown include unfiled device data.        Post Anesthesia Care and Evaluation    Patient location during evaluation: bedside  Patient participation: complete - patient participated  Level of consciousness: awake and alert  Pain management: adequate    Airway patency: patent  Anesthetic complications: No anesthetic complications  PONV Status: none  Cardiovascular status: acceptable  Respiratory status: acceptable  Hydration status: acceptable    Comments: An Anesthesiologist personally participated in the most demanding procedures (including induction and emergence if applicable) in the anesthesia plan, monitored the course of anesthesia administration at frequent intervals and remained physically present and available for immediate diagnosis and treatment of emergencies.

## 2024-01-17 NOTE — ANESTHESIA PREPROCEDURE EVALUATION
Anesthesia Evaluation     Patient summary reviewed and Nursing notes reviewed   no history of anesthetic complications:   NPO Solid Status: > 8 hours  NPO Liquid Status: > 2 hours           Airway   Mallampati: II  TM distance: >3 FB  Neck ROM: full  No difficulty expected  Dental    (+) implants    Pulmonary - negative pulmonary ROS and normal exam    breath sounds clear to auscultation  Cardiovascular - normal exam  Exercise tolerance: good (4-7 METS)    (+) hypertension, dysrhythmias (Currently Sinus) Atrial Fib, hyperlipidemia      Neuro/Psych- negative ROS  GI/Hepatic/Renal/Endo    (+) diabetes mellitus    Musculoskeletal     Abdominal  - normal exam   Substance History - negative use     OB/GYN negative ob/gyn ROS         Other   arthritis,                       Anesthesia Plan    ASA 3     general with block     intravenous induction     Anesthetic plan, risks, benefits, and alternatives have been provided, discussed and informed consent has been obtained with: patient and other.    Plan discussed with CRNA.        CODE STATUS:

## 2024-01-18 LAB
ANION GAP SERPL CALCULATED.3IONS-SCNC: 9.4 MMOL/L (ref 5–15)
BUN SERPL-MCNC: 15 MG/DL (ref 8–23)
BUN/CREAT SERPL: 19.2 (ref 7–25)
CALCIUM SPEC-SCNC: 8.6 MG/DL (ref 8.6–10.5)
CHLORIDE SERPL-SCNC: 105 MMOL/L (ref 98–107)
CO2 SERPL-SCNC: 22.6 MMOL/L (ref 22–29)
CREAT SERPL-MCNC: 0.78 MG/DL (ref 0.57–1)
EGFRCR SERPLBLD CKD-EPI 2021: 76.9 ML/MIN/1.73
GLUCOSE BLDC GLUCOMTR-MCNC: 133 MG/DL (ref 70–99)
GLUCOSE BLDC GLUCOMTR-MCNC: 149 MG/DL (ref 70–99)
GLUCOSE BLDC GLUCOMTR-MCNC: 162 MG/DL (ref 70–99)
GLUCOSE BLDC GLUCOMTR-MCNC: 168 MG/DL (ref 70–99)
GLUCOSE SERPL-MCNC: 138 MG/DL (ref 65–99)
HCT VFR BLD AUTO: 30 % (ref 34–46.6)
HGB BLD-MCNC: 9.6 G/DL (ref 12–15.9)
MAGNESIUM SERPL-MCNC: 2.1 MG/DL (ref 1.6–2.4)
PHOSPHATE SERPL-MCNC: 3.9 MG/DL (ref 2.5–4.5)
POTASSIUM SERPL-SCNC: 4 MMOL/L (ref 3.5–5.2)
SODIUM SERPL-SCNC: 137 MMOL/L (ref 136–145)

## 2024-01-18 PROCEDURE — 82948 REAGENT STRIP/BLOOD GLUCOSE: CPT

## 2024-01-18 PROCEDURE — A9270 NON-COVERED ITEM OR SERVICE: HCPCS | Performed by: INTERNAL MEDICINE

## 2024-01-18 PROCEDURE — 97116 GAIT TRAINING THERAPY: CPT

## 2024-01-18 PROCEDURE — 85018 HEMOGLOBIN: CPT | Performed by: ORTHOPAEDIC SURGERY

## 2024-01-18 PROCEDURE — 97150 GROUP THERAPEUTIC PROCEDURES: CPT

## 2024-01-18 PROCEDURE — 97165 OT EVAL LOW COMPLEX 30 MIN: CPT

## 2024-01-18 PROCEDURE — 94760 N-INVAS EAR/PLS OXIMETRY 1: CPT

## 2024-01-18 PROCEDURE — 85014 HEMATOCRIT: CPT | Performed by: ORTHOPAEDIC SURGERY

## 2024-01-18 PROCEDURE — 63710000001 HYDROCODONE-ACETAMINOPHEN 7.5-325 MG TABLET: Performed by: ORTHOPAEDIC SURGERY

## 2024-01-18 PROCEDURE — 25010000002 CEFAZOLIN IN DEXTROSE 2-4 GM/100ML-% SOLUTION: Performed by: ORTHOPAEDIC SURGERY

## 2024-01-18 PROCEDURE — 25810000003 SODIUM CHLORIDE 0.9 % SOLUTION: Performed by: INTERNAL MEDICINE

## 2024-01-18 PROCEDURE — 97110 THERAPEUTIC EXERCISES: CPT

## 2024-01-18 PROCEDURE — 99214 OFFICE O/P EST MOD 30 MIN: CPT | Performed by: INTERNAL MEDICINE

## 2024-01-18 PROCEDURE — 84100 ASSAY OF PHOSPHORUS: CPT | Performed by: INTERNAL MEDICINE

## 2024-01-18 PROCEDURE — 97535 SELF CARE MNGMENT TRAINING: CPT

## 2024-01-18 PROCEDURE — 63710000001 INSULIN LISPRO (HUMAN) PER 5 UNITS: Performed by: INTERNAL MEDICINE

## 2024-01-18 PROCEDURE — 63710000001 APIXABAN 5 MG TABLET: Performed by: ORTHOPAEDIC SURGERY

## 2024-01-18 PROCEDURE — 63710000001 PANTOPRAZOLE 40 MG TABLET DELAYED-RELEASE: Performed by: INTERNAL MEDICINE

## 2024-01-18 PROCEDURE — A9270 NON-COVERED ITEM OR SERVICE: HCPCS | Performed by: ORTHOPAEDIC SURGERY

## 2024-01-18 PROCEDURE — 80048 BASIC METABOLIC PNL TOTAL CA: CPT | Performed by: INTERNAL MEDICINE

## 2024-01-18 PROCEDURE — 83735 ASSAY OF MAGNESIUM: CPT | Performed by: INTERNAL MEDICINE

## 2024-01-18 PROCEDURE — 63710000001 POLYETHYLENE GLYCOL 17 G PACK: Performed by: INTERNAL MEDICINE

## 2024-01-18 PROCEDURE — 82948 REAGENT STRIP/BLOOD GLUCOSE: CPT | Performed by: INTERNAL MEDICINE

## 2024-01-18 PROCEDURE — 63710000001 SENNOSIDES-DOCUSATE 8.6-50 MG TABLET: Performed by: INTERNAL MEDICINE

## 2024-01-18 PROCEDURE — 94799 UNLISTED PULMONARY SVC/PX: CPT

## 2024-01-18 RX ORDER — CARVEDILOL 3.12 MG/1
3.12 TABLET ORAL 2 TIMES DAILY WITH MEALS
Status: DISCONTINUED | OUTPATIENT
Start: 2024-01-18 | End: 2024-01-20 | Stop reason: HOSPADM

## 2024-01-18 RX ADMIN — HYDROCODONE BITARTRATE AND ACETAMINOPHEN 1 TABLET: 7.5; 325 TABLET ORAL at 21:42

## 2024-01-18 RX ADMIN — HYDROCODONE BITARTRATE AND ACETAMINOPHEN 1 TABLET: 7.5; 325 TABLET ORAL at 17:39

## 2024-01-18 RX ADMIN — POLYETHYLENE GLYCOL 3350 17 G: 17 POWDER, FOR SOLUTION ORAL at 21:42

## 2024-01-18 RX ADMIN — CEFAZOLIN SODIUM 2000 MG: 2 INJECTION, SOLUTION INTRAVENOUS at 02:29

## 2024-01-18 RX ADMIN — PANTOPRAZOLE SODIUM 40 MG: 40 TABLET, DELAYED RELEASE ORAL at 06:08

## 2024-01-18 RX ADMIN — DOCUSATE SODIUM 50MG AND SENNOSIDES 8.6MG 1 TABLET: 8.6; 5 TABLET, FILM COATED ORAL at 08:25

## 2024-01-18 RX ADMIN — INSULIN LISPRO 2 UNITS: 100 INJECTION, SOLUTION INTRAVENOUS; SUBCUTANEOUS at 12:20

## 2024-01-18 RX ADMIN — HYDROCODONE BITARTRATE AND ACETAMINOPHEN 1 TABLET: 7.5; 325 TABLET ORAL at 12:20

## 2024-01-18 RX ADMIN — SODIUM CHLORIDE 100 ML/HR: 9 INJECTION, SOLUTION INTRAVENOUS at 09:56

## 2024-01-18 RX ADMIN — INSULIN LISPRO 2 UNITS: 100 INJECTION, SOLUTION INTRAVENOUS; SUBCUTANEOUS at 17:02

## 2024-01-18 RX ADMIN — DOCUSATE SODIUM 50MG AND SENNOSIDES 8.6MG 1 TABLET: 8.6; 5 TABLET, FILM COATED ORAL at 21:41

## 2024-01-18 RX ADMIN — APIXABAN 5 MG: 5 TABLET, FILM COATED ORAL at 21:42

## 2024-01-18 RX ADMIN — APIXABAN 5 MG: 5 TABLET, FILM COATED ORAL at 09:56

## 2024-01-18 RX ADMIN — HYDROCODONE BITARTRATE AND ACETAMINOPHEN 2 TABLET: 7.5; 325 TABLET ORAL at 08:26

## 2024-01-18 NOTE — THERAPY EVALUATION
Patient Name: Ely Coats  : 1945    MRN: [id number]                              Today's Date: 2024       Admit Date: 2024    Visit Dx:     ICD-10-CM ICD-9-CM   1. Difficulty in walking  R26.2 719.7   2. Primary osteoarthritis of right knee  M17.11 715.16   3. Decreased activities of daily living (ADL)  Z78.9 V49.89     Patient Active Problem List   Diagnosis    Bilateral primary osteoarthritis of knee    Pain in both knees    Essential hypertension    Paroxysmal atrial fibrillation    S/P ERCP    Hyperlipidemia    Right hip pain    Greater trochanteric bursitis of right hip    Primary localized osteoarthritis of right knee    Primary osteoarthritis of right knee     Past Medical History:   Diagnosis Date    Atrial fibrillation with RVR     FOLLOWS W/LINK, NO RECENT ISSUES, NO CP    Calculus of gallbladder with acute on chronic cholecystitis with obstruction 2022    Cancer     SKIN CANCER REMOVED    Colon polyp     Contusion of face     Diabetes mellitus     BS IN  THE A.M. 120 ON AVERAGE    Essential hypertension     CONT W/MEDS    Fall     Gallstones     Hyperlipidemia     Kidney stone     Osteoarthritis     Osteopenia     Renal insufficiency     due to nsaids (2017 states labs wnl) NO CURRENT PROBLEMS    Snoring     NO CPAP    Wound infection after surgery 2022     Past Surgical History:   Procedure Laterality Date    BLEPHAROPLASTY Bilateral     CARDIAC CATHETERIZATION Left     [facility], Dr. Cooper Ramires, NO INTERVENTION    CHOLECYSTECTOMY N/A 2022    Procedure: CHOLECYSTECTOMY LAPAROSCOPIC;  Surgeon: Josue Jernigan MD;  Location: [facility] MAIN OR;  Service: General;  Laterality: N/A;    COLONOSCOPY      negative    COLONOSCOPY      polyp-repeat     CYSTOSCOPY  2016    negative- Dr. Murphy per patient urethral polyp noted    ERCP N/A 2022    Procedure: ENDOSCOPIC RETROGRADE CHOLANGIOPANCREATOGRAPHY WITH SPHINCTEROTOMY,  BALLOON SWEEP, COMMON BILE DUCT STENT PLACED, PANCREATIC DUCT STENT PLACED;  Surgeon: Gurvinder Perry MD;  Location: [facility] ENDOSCOPY;  Service: Gastroenterology;  Laterality: N/A;  BILE DUCT STONES    ERCP N/A 5/10/2022    Procedure: ENDOSCOPIC RETROGRADE CHOLANGIOPANCREATOGRAPHY WITH STENT REMOVAL X2, SPHINCTEROTOMY, 12-15MM BALLOON SWEEP WITH BILE DUCT STONE REMOVAL;  Surgeon: Gurvinder Perry MD;  Location: [facility] ENDOSCOPY;  Service: Gastroenterology;  Laterality: N/A;  BILE DUCT STONES    SKIN CANCER EXCISION  04/2016    basal cell on back    TOTAL KNEE ARTHROPLASTY Right 1/17/2024    Procedure: RIGHT TOTAL KNEE ARTHROPLASTY WITH CLARISSA ROBOT;  Surgeon: Geraldo Gann MD;  Location: [facility] MAIN OR;  Service: Robotics - Ortho;  Laterality: Right;    TUBAL ABDOMINAL LIGATION      WRIST FRACTURE SURGERY Left     ORIF      General Information       Row Name 01/18/24 1049 01/18/24 1029       OT Time and Intention    Document Type therapy note (daily note)  -AV evaluation  -AV    Mode of Treatment individual therapy;occupational therapy  -AV individual therapy;occupational therapy  -AV      Row Name 01/18/24 1029          General Information    Patient Profile Reviewed yes  -AV     Prior Level of Function independent:;ADL's;home management;transfer;all household mobility  stands to shower (tub with grab bar). stands at sink to groom. elevated commode. ambulates without assistive device. no home O2. caregiver for .  -AV     Existing Precautions/Restrictions fall  WBAT  -AV     Barriers to Rehab none identified  -AV       Row Name 01/18/24 1029          Occupational Profile    Reason for Services/Referral (Occupational Profile) Patient is an 78 year old female admitted to [facility] on 1/17/24 with right knee pain. She is currently post-op day 1: total knee replacement. She is on 2N/ room air.  OT consulted due to recent decline in ADL/ transfer independence.  No previous OT services for  current condition.  -AV       Row Name 01/18/24 1029          Living Environment    People in Home spouse  -AV       Row Name 01/18/24 1029          Home Main Entrance    Number of Stairs, Main Entrance none  -AV       Row Name 01/18/24 1029          Stairs Within Home, Primary    Stairs, Within Home, Primary basement stairs  -AV       Row Name 01/18/24 1049 01/18/24 1029       Cognition    Orientation Status (Cognition) --  Receptive to cues for safe positioning and transfers,  adaptive techniques for lower body self-care  -AV --  alert, pleasant and cooperative. able to follow commands. somewhat confused at times with questionable safety awareness. cues to redirect during ADL session.  -AV      Row Name 01/18/24 1049 01/18/24 1029       Safety Issues, Functional Mobility    Impairments Affecting Function (Mobility) balance;cognition;endurance/activity tolerance  -AV balance;cognition;endurance/activity tolerance  -AV              User Key  (r) = Recorded By, (t) = Taken By, (c) = Cosigned By      Initials Name Provider Type    AV James Lincoln OT Occupational Therapist                     Mobility/ADL's       Row Name 01/18/24 1050 01/18/24 1039       Transfers    Transfers sit-stand transfer;bed-chair transfer;toilet transfer  -AV --    Comment, (Transfers) -- min assist/ RW and cues  -AV      Row Name 01/18/24 1050          Bed-Chair Transfer    Bed-Chair [place] (Transfers) minimum assist (75% patient effort);verbal cues  -AV     Assistive Device (Bed-Chair Transfers) walker, front-wheeled  -AV       Row Name 01/18/24 1050          Sit-Stand Transfer    Sit-Stand [place] (Transfers) contact guard;verbal cues  -AV     Assistive Device (Sit-Stand Transfers) walker, front-wheeled  -AV     Comment, (Sit-Stand Transfer) X 5 during functional ADL session  -AV       Row Name 01/18/24 1050          Toilet Transfer    [place] Level (Toilet Transfer) minimum assist (75% patient effort);verbal  cues;nonverbal cues (demo/gesture)  -AV     Assistive Device (Toilet Transfer) raised toilet seat;walker, front-wheeled  -AV       Row Name 01/18/24 1050          Functional Mobility    Functional Mobility- Comment Ambulated to/from bathroom min assist/RW with cues for safe technique  -AV       Row Name 01/18/24 1050 01/18/24 1039       Activities of Daily Living    BADL Assessment/Intervention --  (I) upper body bathing/dressing with set up while seated.  Min assist lower body bathing.  CGA/min assist to don pants.  Max assist to don and doff slipper socks.  Min assist toilet hygiene using elevated commode (pull pants over hips:1 loss of balance).  -AV --  Independent feeding, grooming and upper body bathing/ dressing with setup while seated. Min assist lower body bathing. Mod assist lower body dressing. Min assist toilet hygiene (elevated commode).  -AV      Row Name 01/18/24 1050          Mobility    Extremity Weight-bearing Status right lower extremity  -AV     Right Lower Extremity (Weight-bearing Status) weight-bearing as tolerated (WBAT)  -AV               User Key  (r) = Recorded By, (t) = Taken By, (c) = Cosigned By      Initials Name Provider Type    AV James Lincoln OT Occupational Therapist                   Obj/Interventions       Row Name 01/18/24 1041          Sensory Assessment (Somatosensory)    Sensory Assessment (Somatosensory) UE sensation intact  -AV       Row Name 01/18/24 1041          Vision Assessment/Intervention    Visual Impairment/Limitations WFL  -AV       Row Name 01/18/24 1041          Range of Motion Comprehensive    General Range of Motion bilateral upper extremity ROM WFL  -AV     Comment, General Range of Motion AROM  -AV       Row Name 01/18/24 1041          Strength Comprehensive (MMT)    Comment, General Manual Muscle Testing (MMT) Assessment 4/5 bilateral upper extremities  -AV       Row Name 01/18/24 1041          Motor Skills    Motor Skills coordination;functional  endurance  -AV     Coordination WFL  right dominant  -AV     Functional Endurance fair minus  -AV       Row Name 01/18/24 1053 01/18/24 1041       Balance    Balance Assessment sitting dynamic balance;sit to stand dynamic balance;standing dynamic balance  -AV sitting dynamic balance;standing dynamic balance  -AV    Dynamic Sitting Balance independent  -AV independent  -AV    Dynamic Standing Balance minimal assist  -AV minimal assist  -AV    Position/Device Used, Standing Balance walker, rolling  -AV walker, rolling  -AV    Balance Interventions sit to stand;supported;minimal challenge;occupation based/functional task  -AV --              User Key  (r) = Recorded By, (t) = Taken By, (c) = Cosigned By      Initials Name Provider Type    AV James Lincoln OT Occupational Therapist                   Goals/Plan       Row Name 01/18/24 1045          Transfer Goal 1 (OT)    Activity/Assistive Device (Transfer Goal 1, OT) transfers, all;walker, rolling  -AV     [place] Level/Cues Needed (Transfer Goal 1, OT) modified independence  -AV     Time Frame (Transfer Goal 1, OT) long term goal (LTG);10 days  -AV       Row Name 01/18/24 1045          Bathing Goal 1 (OT)    Activity/Device (Bathing Goal 1, OT) bathing skills, all;tub bench  -AV     [place] Level/Cues Needed (Bathing Goal 1, OT) modified independence  -AV     Time Frame (Bathing Goal 1, OT) long term goal (LTG);10 days  -AV       Row Name 01/18/24 1045          Dressing Goal 1 (OT)    Activity/Device (Dressing Goal 1, OT) dressing skills, all  -AV     [place]/Cues Needed (Dressing Goal 1, OT) modified independence  -AV     Time Frame (Dressing Goal 1, OT) long term goal (LTG);10 days  -AV       Row Name 01/18/24 1045          Toileting Goal 1 (OT)    Activity/Device (Toileting Goal 1, OT) toileting skills, all;raised toilet seat  -AV     [place] Level/Cues Needed (Toileting Goal 1, OT) modified independence  -AV     Time Frame (Toileting Goal 1,  OT) long term goal (LTG);10 days  -AV       Row Name 01/18/24 1045          Grooming Goal 1 (OT)    Activity/Device (Grooming Goal 1, OT) grooming skills, all  -AV     [place] (Grooming Goal 1, OT) modified independence  Standing at sink  -AV     Time Frame (Grooming Goal 1, OT) long term goal (LTG);10 days  -AV       Row Name 01/18/24 1045          Problem Specific Goal 1 (OT)    Problem Specific Goal 1 (OT) Patient will demonstrate fair endurance/activity tolerance needed to support ADLs.  -AV     Time Frame (Problem Specific Goal 1, OT) long term goal (LTG);10 days  -AV       Row Name 01/18/24 1045          Therapy Assessment/Plan (OT)    Planned Therapy Interventions (OT) activity tolerance training;BADL retraining;functional balance retraining;IADL retraining;occupation/activity based interventions;patient/caregiver education/training;transfer/mobility retraining  -AV               User Key  (r) = Recorded By, (t) = Taken By, (c) = Cosigned By      Initials Name Provider Type    AV James Lincoln, OT Occupational Therapist                   Clinical Impression       Row Name 01/18/24 1042          Pain Assessment    Pretreatment Pain Rating 7/10  -AV     Posttreatment Pain Rating 7/10  -AV     Pain Location - Side/Orientation Right  -AV     Pain Location - knee  -AV     Pain Intervention(s) Nursing Notified;Repositioned  -AV       Row Name 01/18/24 1042          Plan of Care Review    Plan of Care Reviewed With patient  -AV     Progress improving  Through ADL/ transfer retraining, patient is now able to perform bathing/ toileting min assist and lower body dressing mod assist with setup and cues for safe positioning and adaptive techniques.  -AV     Outcome Evaluation Patient presents with limitations of balance, endurance/activity tolerance which impede her ability to perform ADL and transfers as prior.  The skills of a therapist will be required to safely and effectively implement treatment plan to  restore maximal level of function.  -AV       Row Name 01/18/24 1042          Therapy Assessment/Plan (OT)    Patient/Family Therapy Goal Statement (OT) Walk-get around better- back to work  -AV     Rehab Potential (OT) good, to achieve stated therapy goals  -AV     Criteria for Skilled Therapeutic Interventions Met (OT) yes;meets criteria;skilled treatment is necessary  -AV     Therapy Frequency (OT) 5 times/wk  -AV       Row Name 01/18/24 1042          Therapy Plan Review/Discharge Plan (OT)    Equipment Needs Upon Discharge (OT) walker, rolling;commode chair;tub bench  -AV     Anticipated Discharge Disposition (OT) skilled nursing facility  -AV       Row Name 01/18/24 1042          Vital Signs    O2 Delivery Pre Treatment room air  -AV     O2 Delivery Intra Treatment room air  -AV     O2 Delivery Post Treatment room air  -AV       Row Name 01/18/24 1042          Positioning and Restraints    Pre-Treatment Position sitting in chair/recliner  -AV     Post Treatment Position chair  -AV     In Chair call light within reach;encouraged to call for assist;exit alarm on;reclined  -AV               User Key  (r) = Recorded By, (t) = Taken By, (c) = Cosigned By      Initials Name Provider Type    AV James Lincoln, OT Occupational Therapist                   Outcome Measures       Row Name 01/18/24 1046          How much help from another is currently needed...    Putting on and taking off regular lower body clothing? 2  -AV     Bathing (including washing, rinsing, and drying) 3  -AV     Toileting (which includes using toilet bed pan or urinal) 3  -AV     Putting on and taking off regular upper body clothing 4  -AV     Taking care of personal grooming (such as brushing teeth) 4  -AV     Eating meals 4  -AV     AM-PAC 6 Clicks Score (OT) 20  -AV       Row Name 01/18/24 1046          Functional Assessment    Outcome Measure Options AM-PAC 6 Clicks Daily Activity (OT);Optimal Instrument  -AV       Row Name 01/18/24 1046           Optimal Instrument    Optimal Instrument Optimal - 3  -AV     Bending/Stooping 3  -AV     Standing 2  -AV     Reaching 1  -AV     From the list, choose the 3 activities you would most like to be able to do without any difficulty Bending/stooping;Standing;Reaching  -AV     Total Score Optimal - 3 6  -AV               User Key  (r) = Recorded By, (t) = Taken By, (c) = Cosigned By      Initials Name Provider Type    James Kim OT Occupational Therapist                    Occupational Therapy Education       Title: PT OT SLP Therapies (Done)       Topic: Occupational Therapy (Done)       Point: ADL training (Done)       Description:   Instruct learner(s) on proper safety adaptation and remediation techniques during self care or transfers.   Instruct in proper use of assistive devices.                  Learning Progress Summary             Patient Acceptance, E, VU by AV at 1/18/2024 1047    Comment: WBAT  Need for staff assistance for all standing ADL/transfers  Safe transfer techniques  Safe positioning for ADLs  Adaptive techniques for lower body ADLs  ADL home safety/ adaptive equipment recommendations                         Point: Home exercise program (Done)       Description:   Instruct learner(s) on appropriate technique for monitoring, assisting and/or progressing therapeutic exercises/activities.                  Learning Progress Summary             Patient Acceptance, E, VU by AV at 1/18/2024 1047    Comment: WBAT  Need for staff assistance for all standing ADL/transfers  Safe transfer techniques  Safe positioning for ADLs  Adaptive techniques for lower body ADLs  ADL home safety/ adaptive equipment recommendations                         Point: Precautions (Done)       Description:   Instruct learner(s) on prescribed precautions during self-care and functional transfers.                  Learning Progress Summary             Patient Acceptance, E, VU by AV at 1/18/2024 1047    Comment: WBAT  Need  for staff assistance for all standing ADL/transfers  Safe transfer techniques  Safe positioning for ADLs  Adaptive techniques for lower body ADLs  ADL home safety/ adaptive equipment recommendations                                         User Key       Initials Effective Dates Name Provider Type Discipline    AV 06/16/21 -  James Lincoln OT Occupational Therapist OT                  OT Recommendation and Plan  Planned Therapy Interventions (OT): activity tolerance training, BADL retraining, functional balance retraining, IADL retraining, occupation/activity based interventions, patient/caregiver education/training, transfer/mobility retraining  Therapy Frequency (OT): 5 times/wk  Plan of Care Review  Plan of Care Reviewed With: patient  Progress: improving (Through ADL/ transfer retraining, patient is now able to perform bathing/ toileting min assist and lower body dressing mod assist with setup and cues for safe positioning and adaptive techniques.)  Outcome Evaluation: Patient presents with limitations of balance, endurance/activity tolerance which impede her ability to perform ADL and transfers as prior.  The skills of a therapist will be required to safely and effectively implement treatment plan to restore maximal level of function.     Time Calculation:   Evaluation Complexity (OT)  Review Occupational Profile/Medical/Therapy History Complexity: expanded/moderate complexity  Assessment, Occupational Performance/Identification of Deficit Complexity: 1-3 performance deficits  Clinical Decision Making Complexity (OT): problem focused assessment/low complexity  Overall Complexity of Evaluation (OT): low complexity     Time Calculation- OT       Row Name 01/18/24 1048             Time Calculation- OT    OT Received On 01/18/24  -AV      OT Goal Re-Cert Due Date 01/27/24  -AV         Timed Charges    78879 - OT Self Care/Mgmt Minutes 30  -AV         Untimed Charges    OT Eval/Re-eval Minutes 32  -AV         Total  Minutes    Timed Charges Total Minutes 30  -AV      Untimed Charges Total Minutes 32  -AV       Total Minutes 62  -AV                User Key  (r) = Recorded By, (t) = Taken By, (c) = Cosigned By      Initials Name Provider Type    AV James Lincoln OT Occupational Therapist                  Therapy Charges for Today       Code Description Service Date Service Provider Modifiers Qty    [id number] HC OT SELF CARE/MGMT/TRAIN EA 15 MIN 1/18/2024 James Lincoln OT GO 2    [id number]  OT EVAL LOW COMPLEXITY 3 1/18/2024 James Lincoln OT GO 1                 James Lincoln OT  1/18/2024

## 2024-01-18 NOTE — PLAN OF CARE
Goal Outcome Evaluation:  Plan of Care Reviewed With: patient        Progress: improving  Outcome Evaluation: Pt alert and able to make needs known. Pain controlled with PRN Norco. Pt urinating without difficulty. Pt one person assist with rolling walker and gait belt for transfers, ambulated 50+ feet, tolerated well. Pt waiting on rehab placement.

## 2024-01-18 NOTE — THERAPY TREATMENT NOTE
Acute Care - Physical Therapy Treatment Note   T[place]     Patient Name: Ely Coats  : 1945  MRN: [id number]  Today's Date: 2024      Visit Dx:     ICD-10-CM ICD-9-CM   1. Difficulty in walking  R26.2 719.7   2. Primary osteoarthritis of right knee  M17.11 715.16   3. Decreased activities of daily living (ADL)  Z78.9 V49.89     Patient Active Problem List   Diagnosis    Bilateral primary osteoarthritis of knee    Pain in both knees    Essential hypertension    Paroxysmal atrial fibrillation    S/P ERCP    Hyperlipidemia    Right hip pain    Greater trochanteric bursitis of right hip    Primary localized osteoarthritis of right knee    Primary osteoarthritis of right knee     Past Medical History:   Diagnosis Date    Atrial fibrillation with RVR     FOLLOWS W/LINK, NO RECENT ISSUES, NO CP    Calculus of gallbladder with acute on chronic cholecystitis with obstruction 2022    Cancer     SKIN CANCER REMOVED    Colon polyp     Contusion of face     Diabetes mellitus     BS IN  THE A.M. 120 ON AVERAGE    Essential hypertension     CONT W/MEDS    Fall     Gallstones     Hyperlipidemia     Kidney stone     Osteoarthritis     Osteopenia     Renal insufficiency     due to nsaids (2017 states labs wnl) NO CURRENT PROBLEMS    Snoring     NO CPAP    Wound infection after surgery 2022     Past Surgical History:   Procedure Laterality Date    BLEPHAROPLASTY Bilateral     CARDIAC CATHETERIZATION Left     [facility], Dr. Cooper Ramires, NO INTERVENTION    CHOLECYSTECTOMY N/A 2022    Procedure: CHOLECYSTECTOMY LAPAROSCOPIC;  Surgeon: Josue Jernigan MD;  Location: [facility] MAIN OR;  Service: General;  Laterality: N/A;    COLONOSCOPY      negative    COLONOSCOPY      polyp-repeat     CYSTOSCOPY  2016    negative- Dr. Murphy per patient urethral polyp noted    ERCP N/A 2022    Procedure: ENDOSCOPIC RETROGRADE CHOLANGIOPANCREATOGRAPHY WITH  SPHINCTEROTOMY, BALLOON SWEEP, COMMON BILE DUCT STENT PLACED, PANCREATIC DUCT STENT PLACED;  Surgeon: Gurvinder Perry MD;  Location: [facility] ENDOSCOPY;  Service: Gastroenterology;  Laterality: N/A;  BILE DUCT STONES    ERCP N/A 5/10/2022    Procedure: ENDOSCOPIC RETROGRADE CHOLANGIOPANCREATOGRAPHY WITH STENT REMOVAL X2, SPHINCTEROTOMY, 12-15MM BALLOON SWEEP WITH BILE DUCT STONE REMOVAL;  Surgeon: Gurvinder Perry MD;  Location: [facility] ENDOSCOPY;  Service: Gastroenterology;  Laterality: N/A;  BILE DUCT STONES    SKIN CANCER EXCISION  04/2016    basal cell on back    TOTAL KNEE ARTHROPLASTY Right 1/17/2024    Procedure: RIGHT TOTAL KNEE ARTHROPLASTY WITH CLARISSA ROBOT;  Surgeon: Geraldo Gann MD;  Location: [facility] MAIN OR;  Service: Robotics - Ortho;  Laterality: Right;    TUBAL ABDOMINAL LIGATION      WRIST FRACTURE SURGERY Left     ORIF     PT Assessment (last 12 hours)       PT Evaluation and Treatment       Row Name 01/18/24 1242          Physical Therapy Time and Intention    Subjective Information complains of;pain  -RH     Document Type therapy note (daily note)  -     Mode of Treatment individual therapy;group therapy;physical therapy  -     Patient Effort good  -RH     Comment Gait training performed individually; therapeutic exercises performed in a group setting with 7 participants  -       Row Name 01/18/24 1242          General Information    Patient Observations alert;cooperative;agree to therapy  -       Row Name 01/18/24 1242          Pain    Additional Documentation Pain Scale: FACES Pre/Post-Treatment (Group)  -       Row Name 01/18/24 1242          Pain Scale: FACES Pre/Post-Treatment    Pain: FACES Scale, Pretreatment 0-->no hurt  -     Posttreatment Pain Rating --  3/10  -     Pain Location - Side/Orientation Right  -     Pain Location - knee  -       Row Name 01/18/24 1242          Range of Motion (ROM)    Range of Motion --  Pt R knee AAROM at 90 degrees flex and 7  degrees ext.  -       Row Name 01/18/24 1242          Strength (Manual Muscle Testing)    Strength (Manual Muscle Testing) --  Pt R knee ext strength at 3-/5.  -[facility] Name 01/18/24 1242          Mobility    Extremity Weight-bearing Status right lower extremity  -RH     Right Lower Extremity (Weight-bearing Status) weight-bearing as tolerated (WBAT)  -[facility] Name 01/18/24 1242          Transfers    Transfers sit-stand transfer;stand-sit transfer  -[facility] Name 01/18/24 1242          Sit-Stand Transfer    Sit-Stand [place] (Transfers) contact guard  -     Assistive Device (Sit-Stand Transfers) walker, front-wheeled  -[facility] Name 01/18/24 1242          Stand-Sit Transfer    Stand-Sit [place] (Transfers) contact guard  -     Assistive Device (Stand-Sit Transfers) walker, front-wheeled  -[facility] Name 01/18/24 1242          Gait/Stairs (Locomotion)    Gait/Stairs Locomotion gait/ambulation assistive device  -RH     [place] Level (Gait) contact guard  -     Assistive Device (Gait) walker, front-wheeled  -     Patient was able to Ambulate yes  -RH     Distance in Feet (Gait) 75  -RH     Pattern (Gait) 3-point;step-through  -RH     Deviations/Abnormal Patterns (Gait) antalgic;gait speed decreased;stride length decreased  -RH     Bilateral Gait Deviations heel strike decreased  -RH     Gait Assessment/Intervention Pt amb with RW and CGA with 3 point step-through gait pattern with pt rushing advancement of the LLE to minimize RLE weight-bearing.  -[facility] Name 01/18/24 1242          Safety Issues, Functional Mobility    Impairments Affecting Function (Mobility) balance;pain;range of motion (ROM);strength  -[facility] Name 01/18/24 1242          Balance    Balance Assessment standing dynamic balance  -     Dynamic Standing Balance contact guard  -     Position/Device Used, Standing Balance walker, front-wheeled  -[facility] Name 01/18/24 1242          Motor  Skills    Therapeutic Exercise knee;hip;ankle  -       Row Name 01/18/24 1242          Hip (Therapeutic Exercise)    Hip (Therapeutic Exercise) isometric exercises  -     Hip Isometrics (Therapeutic Exercise) right;gluteal sets;10 repetitions;2 sets  -       Row Name 01/18/24 1242          Knee (Therapeutic Exercise)    Knee (Therapeutic Exercise) isometric exercises;strengthening exercise  -     Knee Isometrics (Therapeutic Exercise) right;quad sets;10 repetitions;2 sets  -     Knee Strengthening (Therapeutic Exercise) right;heel slides;SLR (straight leg raise);SAQ (short arc quad);LAQ (long arc quad);10 repetitions;2 sets  -       Row Name 01/18/24 1242          Ankle (Therapeutic Exercise)    Ankle (Therapeutic Exercise) AROM (active range of motion)  -     Ankle AROM (Therapeutic Exercise) right;dorsiflexion;plantarflexion;10 repetitions;2 sets  -       Row Name             Wound 01/17/24 1029 Right anterior knee Incision    Wound - Properties Group Placement Date: 01/17/24  -SC Placement Time: 1029  -SC Present on Original Admission: N  -SC Side: Right  -SC Orientation: anterior  -SC Location: knee  -SC Primary Wound Type: Incision  -SC    Retired Wound - Properties Group Placement Date: 01/17/24  -SC Placement Time: 1029  -SC Present on Original Admission: N  -SC Side: Right  -SC Orientation: anterior  -SC Location: knee  -SC Primary Wound Type: Incision  -SC    Retired Wound - Properties Group Date first assessed: 01/17/24  -SC Time first assessed: 1029  -SC Present on Original Admission: N  -SC Side: Right  -SC Location: knee  -SC Primary Wound Type: Incision  -SC      Row Name             Wound 01/17/24 1030 Right proximal leg Puncture    Wound - Properties Group Placement Date: 01/17/24  -SC Placement Time: 1030  -SC Present on Original Admission: N  -SC Side: Right  -SC Orientation: proximal  -SC Location: leg  -SC Primary Wound Type: Puncture  -SC    Retired Wound - Properties Group  Placement Date: 01/17/24  -SC Placement Time: 1030  -SC Present on Original Admission: N  -SC Side: Right  -SC Orientation: proximal  -SC Location: leg  -SC Primary Wound Type: Puncture  -SC    Retired Wound - Properties Group Date first assessed: 01/17/24  -SC Time first assessed: 1030  -SC Present on Original Admission: N  -SC Side: Right  -SC Location: leg  -SC Primary Wound Type: Puncture  -SC      Row Name 01/18/24 1242          Positioning and Restraints    In Chair call light within reach;encouraged to call for assist;exit alarm on  -RH       Row Name 01/18/24 1242          Progress Summary (PT)    Progress Toward Functional Goals (PT) progress toward functional goals is good  -     Daily Progress Summary (PT) Pt is progressing well with their exercise program.  Will continue current plan of care.  -               User Key  (r) = Recorded By, (t) = Taken By, (c) = Cosigned By      Initials Name Provider Type    Martha Cullen, RN Registered Nurse     Martell Kang PTA Physical Therapist Assistant                    Physical Therapy Education       Title: PT OT SLP Therapies (Done)       Topic: Physical Therapy (Done)       Point: Mobility training (Done)       Learning Progress Summary             Patient Acceptance, E, VU by AV at 1/18/2024 1047    Comment: WBAT  Need for staff assistance for all standing ADL/transfers  Safe transfer techniques  Safe positioning for ADLs  Adaptive techniques for lower body ADLs  ADL home safety/ adaptive equipment recommendations    Acceptance, E, VU,NR by RILEY at 1/18/2024 0352    Acceptance, E,TB, VU by GRAYSON at 1/17/2024 1358   Family Acceptance, E, VU,NR by RILEY at 1/18/2024 0352                         Point: Precautions (Done)       Learning Progress Summary             Patient Acceptance, E, VU by AV at 1/18/2024 1047    Comment: WBAT  Need for staff assistance for all standing ADL/transfers  Safe transfer techniques  Safe positioning for ADLs  Adaptive  techniques for lower body ADLs  ADL home safety/ adaptive equipment recommendations    Acceptance, E, VU,NR by RILEY at 1/18/2024 0352    Acceptance, E,TB, VU by GRAYSON at 1/17/2024 1358   Family Acceptance, E, VU,NR by RILEY at 1/18/2024 0352                                         User Key       Initials Effective Dates Name Provider Type Discipline    RK 01/16/23 -  Mary Perdomo, RN Registered Nurse Nurse    RADHA 06/16/21 -  James Lincoln OT Occupational Therapist OT    GRAYSON 06/03/21 -  Addy Li, PT Physical Therapist PT                  PT Recommendation and Plan     Progress Summary (PT)  Progress Toward Functional Goals (PT): progress toward functional goals is good  Daily Progress Summary (PT): Pt is progressing well with their exercise program.  Will continue current plan of care.   Outcome Measures       Row Name 01/18/24 1200 01/17/24 1358 01/17/24 1302       How much help from another person do you currently need...    Turning from your back to your side while in flat bed without using bedrails? 3  -RH 3  -GRAYSON --    Moving from lying on back to sitting on the side of a flat bed without bedrails? 3  -RH 3  -GRAYSON --    Moving to and from a bed to a chair (including a wheelchair)? 3  -RH 3  -GRAYSON --    Standing up from a chair using your arms (e.g., wheelchair, bedside chair)? 3  -RH 3  -RGAYSON --    Climbing 3-5 steps with a railing? 3  -RH 3  -GRAYSON --    To walk in hospital room? 4  -RH 3  -GRAYSON --    AM-PAC 6 Clicks Score (PT) 19  -RH 18  -GRAYSON --    Highest Level of Mobility Goal 6 --> Walk 10 steps or more  -RH 6 --> Walk 10 steps or more  -GRAYSON --       Functional Assessment    Outcome Measure Options -- -- AM-PAC 6 Clicks Basic Mobility (PT)  -GRAYSON              User Key  (r) = Recorded By, (t) = Taken By, (c) = Cosigned By      Initials Name Provider Type     Martell Kang, PTA Physical Therapist Assistant    Addy Arias, PT Physical Therapist                     Time Calculation:    PT Charges       Row Name  01/18/24 1241             Time Calculation    PT Received On 01/18/24  -RH         Timed Charges    09304 - Gait Training Minutes  8  -RH      73115 - PT Therapeutic Activity Minutes 5  -RH         Untimed Charges    PT Group Therapy Minutes 30  -RH         Total Minutes    Timed Charges Total Minutes 13  -RH      Untimed Charges Total Minutes 30  -RH       Total Minutes 43  -RH                User Key  (r) = Recorded By, (t) = Taken By, (c) = Cosigned By      Initials Name Provider Type     Martell Kang PTA Physical Therapist Assistant                  Therapy Charges for Today       Code Description Service Date Service Provider Modifiers Qty    10060526818 HC GAIT TRAINING EA 15 MIN 1/18/2024 Martell Kang PTA GP 1    18301113740 HC PT THER PROC GROUP 1/18/2024 Martell Kang PTA GP 1            PT G-Codes  Outcome Measure Options: AM-PAC 6 Clicks Daily Activity (OT), Optimal Instrument  AM-PAC 6 Clicks Score (PT): 19  AM-PAC 6 Clicks Score (OT): 20    Martell Kang PTA  1/18/2024

## 2024-01-18 NOTE — PLAN OF CARE
Goal Outcome Evaluation:  Plan of Care Reviewed With: patient        Progress: improving (Through ADL/ transfer retraining, patient is now able to perform bathing/ toileting min assist and lower body dressing mod assist with setup and cues for safe positioning and adaptive techniques.)  Outcome Evaluation: Patient presents with limitations of balance, endurance/activity tolerance which impede her ability to perform ADL and transfers as prior.  The skills of a therapist will be required to safely and effectively implement treatment plan to restore maximal level of function.      Anticipated Discharge Disposition (OT): skilled nursing facility

## 2024-01-18 NOTE — SIGNIFICANT NOTE
01/18/24 1429   Plan   Final Discharge Disposition Code 06 - home with home health care   Final Note Home with [facility].

## 2024-01-18 NOTE — PLAN OF CARE
Goal Outcome Evaluation:  Plan of Care Reviewed With: patient        Progress: improving  Outcome Evaluation: Pt A&O x 4, pt has had some hypotension throughout shift, held AM and evening blood pressure medication, notified MD. Pt has c/o of moderate to severe pain to right knee, administered prn pain medication. Pt ambulates to bathroom x 1 assist with walker and gait belt. Educated patient on pain rating and fall precautions. Pt verbalized understanding. Call light in reach. Pt's referral to [facility] has been denied, patient is aware.

## 2024-01-18 NOTE — THERAPY TREATMENT NOTE
Acute Care - Physical Therapy Treatment Note   T[facility]     Patient Name: Ely Coats  : 1945  MRN: [id number]  Today's Date: 2024      Visit Dx:     ICD-10-CM ICD-9-CM   1. Difficulty in walking  R26.2 719.7   2. Primary osteoarthritis of right knee  M17.11 715.16   3. Decreased activities of daily living (ADL)  Z78.9 V49.89     Patient Active Problem List   Diagnosis    Bilateral primary osteoarthritis of knee    Pain in both knees    Essential hypertension    Paroxysmal atrial fibrillation    S/P ERCP    Hyperlipidemia    Right hip pain    Greater trochanteric bursitis of right hip    Primary localized osteoarthritis of right knee    Primary osteoarthritis of right knee     Past Medical History:   Diagnosis Date    Atrial fibrillation with RVR     FOLLOWS W/LINK, NO RECENT ISSUES, NO CP    Calculus of gallbladder with acute on chronic cholecystitis with obstruction 2022    Cancer     SKIN CANCER REMOVED    Colon polyp     Contusion of face     Diabetes mellitus     BS IN  THE A.M. 120 ON AVERAGE    Essential hypertension     CONT W/MEDS    Fall     Gallstones     Hyperlipidemia     Kidney stone     Osteoarthritis     Osteopenia     Renal insufficiency     due to nsaids (2017 states labs wnl) NO CURRENT PROBLEMS    Snoring     NO CPAP    Wound infection after surgery 2022     Past Surgical History:   Procedure Laterality Date    BLEPHAROPLASTY Bilateral     CARDIAC CATHETERIZATION Left     [facility], Dr. Cooper Ramires, NO INTERVENTION    CHOLECYSTECTOMY N/A 2022    Procedure: CHOLECYSTECTOMY LAPAROSCOPIC;  Surgeon: Josue Jernigan MD;  Location: [facility] MAIN OR;  Service: General;  Laterality: N/A;    COLONOSCOPY      negative    COLONOSCOPY      polyp-repeat     CYSTOSCOPY  2016    negative- Dr. Murphy per patient urethral polyp noted    ERCP N/A 2022    Procedure: ENDOSCOPIC RETROGRADE CHOLANGIOPANCREATOGRAPHY WITH  SPHINCTEROTOMY, BALLOON SWEEP, COMMON BILE DUCT STENT PLACED, PANCREATIC DUCT STENT PLACED;  Surgeon: Gurvinder Perry MD;  Location: [facility] ENDOSCOPY;  Service: Gastroenterology;  Laterality: N/A;  BILE DUCT STONES    ERCP N/A 5/10/2022    Procedure: ENDOSCOPIC RETROGRADE CHOLANGIOPANCREATOGRAPHY WITH STENT REMOVAL X2, SPHINCTEROTOMY, 12-15MM BALLOON SWEEP WITH BILE DUCT STONE REMOVAL;  Surgeon: Gurvinder Perry MD;  Location: [facility] ENDOSCOPY;  Service: Gastroenterology;  Laterality: N/A;  BILE DUCT STONES    SKIN CANCER EXCISION  04/2016    basal cell on back    TOTAL KNEE ARTHROPLASTY Right 1/17/2024    Procedure: RIGHT TOTAL KNEE ARTHROPLASTY WITH CLARISSA ROBOT;  Surgeon: Geraldo Gann MD;  Location: [facility] MAIN OR;  Service: Robotics - Ortho;  Laterality: Right;    TUBAL ABDOMINAL LIGATION      WRIST FRACTURE SURGERY Left     ORIF     PT Assessment (last 12 hours)       PT Evaluation and Treatment       [facility] Name 01/18/24 152 01/18/24 1242       Physical Therapy Time and Intention    Subjective Information complains of;pain;weakness  - complains of;pain  -RH    Document Type therapy note (daily note)  - therapy note (daily note)  -    Mode of Treatment physical therapy;individual therapy  - individual therapy;group therapy;physical therapy  -    Patient Effort good  -RH good  -    Comment -- Gait training performed individually; therapeutic exercises performed in a group setting with 7 participants  -[facility] Name 01/18/24 1242          General Information    Patient Observations alert;cooperative;agree to therapy  -[facility] Name 01/18/24 1242          Pain    Additional Documentation Pain Scale: FACES Pre/Post-Treatment (Group)  -       Row Name 01/18/24 1521 01/18/24 1242       Pain Scale: FACES Pre/Post-Treatment    Pain: FACES Scale, Pretreatment 2-->hurts little bit  - 0-->no hurt  -    Posttreatment Pain Rating 2-->hurts little bit  -RH --  3/10  -RH    Pain Location  - Side/Orientation Right  -RH Right  -RH    Pain Location - knee  -RH knee  -RH      Row Name 01/18/24 1242          Range of Motion (ROM)    Range of Motion --  Pt R knee AAROM at 90 degrees flex and 7 degrees ext.  -       Row Name 01/18/24 1242          Strength (Manual Muscle Testing)    Strength (Manual Muscle Testing) --  Pt R knee ext strength at 3-/5.  -       Row Name 01/18/24 1521 01/18/24 1242       Mobility    Extremity Weight-bearing Status right lower extremity  -RH right lower extremity  -RH    Right Lower Extremity (Weight-bearing Status) weight-bearing as tolerated (WBAT)  -RH weight-bearing as tolerated (WBAT)  -      Row Name 01/18/24 1521 01/18/24 1242       Transfers    Transfers sit-stand transfer;stand-sit transfer  -RH sit-stand transfer;stand-sit transfer  -RH    Maintains Weight-bearing Status (Transfers) able to maintain  -RH --      Row Name 01/18/24 1521 01/18/24 1242       Sit-Stand Transfer    Sit-Stand Antelope (Transfers) contact guard  - contact guard  -    Assistive Device (Sit-Stand Transfers) walker, front-wheeled  - walker, front-wheeled  -      Row Name 01/18/24 1521 01/18/24 1242       Stand-Sit Transfer    Stand-Sit Antelope (Transfers) contact guard  - contact guard  -    Assistive Device (Stand-Sit Transfers) walker, front-wheeled  - walker, front-wheeled  -      Row Name 01/18/24 1521 01/18/24 1242       Gait/Stairs (Locomotion)    Gait/Stairs Locomotion gait/ambulation independence;gait/ambulation assistive device;distance ambulated;gait pattern;gait deviations  -RH gait/ambulation assistive device  -RH    Antelope Level (Gait) contact guard  - contact guard  -    Assistive Device (Gait) walker, front-wheeled  - walker, front-wheeled  -    Patient was able to Ambulate yes  -RH yes  -RH    Distance in Feet (Gait) 100  -RH 75  -RH    Pattern (Gait) 3-point;step-through  -RH 3-point;step-through  -RH    Deviations/Abnormal Patterns  (Gait) base of support, narrow;gait speed decreased;stride length decreased  - antalgic;gait speed decreased;stride length decreased  -    Bilateral Gait Deviations heel strike decreased  - heel strike decreased  -    Gait Assessment/Intervention Pt amb with RW and CGA with 3 point step-through gait pattern with narrow base of support and decreased gait speed, stride length, and bilateral heel strike.  - Pt amb with RW and CGA with 3 point step-through gait pattern with pt rushing advancement of the LLE to minimize RLE weight-bearing.  -      Row Name 01/18/24 1242          Safety Issues, Functional Mobility    Impairments Affecting Function (Mobility) balance;pain;range of motion (ROM);strength  -       Row Name 01/18/24 1521 01/18/24 1242       Balance    Balance Assessment -- standing dynamic balance  -    Dynamic Standing Balance contact guard  - contact guard  -    Position/Device Used, Standing Balance walker, front-wheeled  - walker, front-wheeled  -      Row Name 01/18/24 1242          Motor Skills    Therapeutic Exercise knee;hip;ankle  -       Row Name 01/18/24 1242          Hip (Therapeutic Exercise)    Hip (Therapeutic Exercise) isometric exercises  -     Hip Isometrics (Therapeutic Exercise) right;gluteal sets;10 repetitions;2 sets  -       Row Name 01/18/24 1242          Knee (Therapeutic Exercise)    Knee (Therapeutic Exercise) isometric exercises;strengthening exercise  -     Knee Isometrics (Therapeutic Exercise) right;quad sets;10 repetitions;2 sets  -     Knee Strengthening (Therapeutic Exercise) right;heel slides;SLR (straight leg raise);SAQ (short arc quad);LAQ (long arc quad);10 repetitions;2 sets  -       Row Name 01/18/24 1242          Ankle (Therapeutic Exercise)    Ankle (Therapeutic Exercise) AROM (active range of motion)  -     Ankle AROM (Therapeutic Exercise) right;dorsiflexion;plantarflexion;10 repetitions;2 sets  -       Row Name              Wound 01/17/24 1029 Right anterior knee Incision    Wound - Properties Group Placement Date: 01/17/24  -SC Placement Time: 1029  -SC Present on Original Admission: N  -SC Side: Right  -SC Orientation: anterior  -SC Location: knee  -SC Primary Wound Type: Incision  -SC    Retired Wound - Properties Group Placement Date: 01/17/24  -SC Placement Time: 1029  -SC Present on Original Admission: N  -SC Side: Right  -SC Orientation: anterior  -SC Location: knee  -SC Primary Wound Type: Incision  -SC    Retired Wound - Properties Group Date first assessed: 01/17/24  -SC Time first assessed: 1029  -SC Present on Original Admission: N  -SC Side: Right  -SC Location: knee  -SC Primary Wound Type: Incision  -SC      Row Name             Wound 01/17/24 1030 Right proximal leg Puncture    Wound - Properties Group Placement Date: 01/17/24  -SC Placement Time: 1030  -SC Present on Original Admission: N  -SC Side: Right  -SC Orientation: proximal  -SC Location: leg  -SC Primary Wound Type: Puncture  -SC    Retired Wound - Properties Group Placement Date: 01/17/24  -SC Placement Time: 1030  -SC Present on Original Admission: N  -SC Side: Right  -SC Orientation: proximal  -SC Location: leg  -SC Primary Wound Type: Puncture  -SC    Retired Wound - Properties Group Date first assessed: 01/17/24  -SC Time first assessed: 1030  -SC Present on Original Admission: N  -SC Side: Right  -SC Location: leg  -SC Primary Wound Type: Puncture  -SC      Row Name 01/18/24 1521 01/18/24 1242       Positioning and Restraints    In Chair reclined;call light within reach;encouraged to call for assist  - call light within reach;encouraged to call for assist;exit alarm on  -      Row Name 01/18/24 1521 01/18/24 1242       Progress Summary (PT)    Progress Toward Functional Goals (PT) progress toward functional goals is fair  - progress toward functional goals is good  -    Daily Progress Summary (PT) -- Pt is progressing well with their exercise  program.  Will continue current plan of care.  -              User Key  (r) = Recorded By, (t) = Taken By, (c) = Cosigned By      Initials Name Provider Type    Martha Cullen, RN Registered Nurse     Martell Kang, PTA Physical Therapist Assistant                 Right Knee Ther-ex   Exercise  Reps  Sets    Long arc Quads   10 2   Short arc Quads  10 2   Heel Slides  10 2   Ankle Pumps  10 2   Quad sets  10 2   Glut sets  10 2   Straight leg raise  10 2          Physical Therapy Education       Title: PT OT SLP Therapies (Done)       Topic: Physical Therapy (Done)       Point: Mobility training (Done)       Learning Progress Summary             Patient Acceptance, E, VU by AV at 1/18/2024 1047    Comment: WBAT  Need for staff assistance for all standing ADL/transfers  Safe transfer techniques  Safe positioning for ADLs  Adaptive techniques for lower body ADLs  ADL home safety/ adaptive equipment recommendations    Acceptance, E, VU,NR by  at 1/18/2024 0352    Acceptance, E,TB, VU by GRAYSON at 1/17/2024 1358   Family Acceptance, E, VU,NR by  at 1/18/2024 0352                         Point: Precautions (Done)       Learning Progress Summary             Patient Acceptance, E, VU by RADHA at 1/18/2024 1047    Comment: WBAT  Need for staff assistance for all standing ADL/transfers  Safe transfer techniques  Safe positioning for ADLs  Adaptive techniques for lower body ADLs  ADL home safety/ adaptive equipment recommendations    Acceptance, E, VU,NR by RILEY at 1/18/2024 0352    Acceptance, E,TB, VU by GRAYSON at 1/17/2024 1358   Family Acceptance, E, VU,NR by  at 1/18/2024 0352                                         User Key       Initials Effective Dates Name Provider Type Discipline     01/16/23 -  Mary Perdomo, RN Registered Nurse Nurse    RADHA 06/16/21 -  James Lincoln OT Occupational Therapist OT    GRAYSON 06/03/21 -  Addy Li PT Physical Therapist PT                  PT Recommendation and Plan      Progress Summary (PT)  Progress Toward Functional Goals (PT): progress toward functional goals is fair  Daily Progress Summary (PT): Pt is progressing well with their exercise program.  Will continue current plan of care.   Outcome Measures       Row Name 01/18/24 1200 01/17/24 1358 01/17/24 1302       How much help from another person do you currently need...    Turning from your back to your side while in flat bed without using bedrails? 3  -RH 3  -GRAYSON --    Moving from lying on back to sitting on the side of a flat bed without bedrails? 3  -RH 3  -GRAYSON --    Moving to and from a bed to a chair (including a wheelchair)? 3  -RH 3  -GRAYSON --    Standing up from a chair using your arms (e.g., wheelchair, bedside chair)? 3  -RH 3  -GRAYSON --    Climbing 3-5 steps with a railing? 3  -RH 3  -GRAYSON --    To walk in hospital room? 4  -RH 3  -GRAYSON --    AM-PAC 6 Clicks Score (PT) 19  -RH 18  -GRAYSON --    Highest Level of Mobility Goal 6 --> Walk 10 steps or more  -RH 6 --> Walk 10 steps or more  -GRAYSON --       Functional Assessment    Outcome Measure Options -- -- AM-PAC 6 Clicks Basic Mobility (PT)  -GRAYSON              User Key  (r) = Recorded By, (t) = Taken By, (c) = Cosigned By      Initials Name Provider Type    RH Martell Kang, PTA Physical Therapist Assistant    Addy Arias, PT Physical Therapist                     Time Calculation:    PT Charges       Row Name 01/18/24 1520 01/18/24 1241          Time Calculation    PT Received On 01/18/24  -RH 01/18/24  -RH        Timed Charges    84867 - PT Therapeutic Exercise Minutes 17  -RH --     74794 - Gait Training Minutes  4  -RH 8  -RH     89475 - PT Therapeutic Activity Minutes 3  -RH 5  -RH        Untimed Charges    PT Group Therapy Minutes -- 30  -RH        Total Minutes    Timed Charges Total Minutes 24  -RH 13  -RH     Untimed Charges Total Minutes -- 30  -RH      Total Minutes 24  -RH 43  -RH               User Key  (r) = Recorded By, (t) = Taken By, (c) = Cosigned By       Initials Name Provider Type     Martell Kang PTA Physical Therapist Assistant                  Therapy Charges for Today       Code Description Service Date Service Provider Modifiers Qty    93266664251 HC GAIT TRAINING EA 15 MIN 1/18/2024 Martell Kang PTA GP 1    56528595911 HC PT THER PROC GROUP 1/18/2024 Martell Kang PTA GP 1    79923400543 HC PT THER PROC EA 15 MIN 1/18/2024 Martell Kang PTA GP 1    21711711469 HC GAIT TRAINING EA 15 MIN 1/18/2024 Martell Kang PTA GP 1            PT G-Codes  Outcome Measure Options: AM-PAC 6 Clicks Daily Activity (OT), Optimal Instrument  AM-PAC 6 Clicks Score (PT): 19  AM-PAC 6 Clicks Score (OT): 20    Martell Kang PTA  1/18/2024

## 2024-01-18 NOTE — PROGRESS NOTES
[facility]     Progress Note    Patient Name: Ely Coats  : 1945  MRN: [id number]  Primary Care Physician:  Julius Tijerina MD  Date of admission: 2024    Subjective   Subjective     HPI:  Patient Reports no new complaints.  Pain is controlled.  She denies any chest pain or shortness of air.    Review of Systems   See HPI    Objective   Objective     Vitals:   Temp:  [97.1 °F (36.2 °C)-99.3 °F (37.4 °C)] 98.2 °F (36.8 °C)  Heart Rate:  [57-76] 70  Resp:  [14-18] 18  BP: ()/(52-80) 99/57  Flow (L/min):  [2-3] 2  Physical Exam    General: Alert, no acute distress   Chest: Unlabored breathing, cardiovascular: Regular heart rate   Musculoskeletal: Neurovascular intact extremity.  Dressing intact.  Positive pulses.  Full extension.  Negative Clint.    Result Review      Hemoglobin   Date Value Ref Range Status   2024 9.6 (L) 12.0 - 15.9 g/dL Final     Hematocrit   Date Value Ref Range Status   2024 30.0 (L) 34.0 - 46.6 % Final        Result Review:  I have personally reviewed the results from the time of this admission to 2024 07:21 EST and agree with these findings:  [x]  Laboratory  []  Microbiology  [x]  Radiology  []  EKG/Telemetry   []  Cardiology/Vascular   []  Pathology  []  Old records  []  Other:      Assessment & Plan   Assessment / Plan     Brief Patient Summary:  Ely Coats is a 78 y.o. female who is status post right total knee replacement    Active Hospital Problems:  Active Hospital Problems    Diagnosis     **Primary osteoarthritis of right knee     Primary localized osteoarthritis of right knee      Plan: Weightbearing as tolerated with walker   physical and Occupational Therapy  Pain control  DVT prophylaxis  Discharge planning: Patient requesting inpatient rehab.  May plan for home tomorrow with home health services if unable to obtain rehab bed.       DVT prophylaxis:  Mechanical DVT prophylaxis orders are present.    CODE STATUS:    Code Status (Patient has no pulse and is not breathing): CPR (Attempt to Resuscitate)  Medical Interventions (Patient has pulse or is breathing): Full Support    Disposition:  I expect patient to be discharged when medically able.    Electronically signed by Geraldo Gann MD, 01/18/24, 7:21 AM EST.

## 2024-01-18 NOTE — PROGRESS NOTES
[facility]   Hospitalist Progress Note    Date of admission: 1/17/2024  Patient Name: Ely Coats  1945  Date: 1/18/2024      Subjective     Follow up from surgery/medical management of co-morbidities     Interval Followup: pain ok overall.  No soa.  Wants to got o rehab, if unable to get a spot will do HH.  Ortho recommending watching overnight        Objective     Vitals:   Temp:  [97.7 °F (36.5 °C)-98.8 °F (37.1 °C)] 98.2 °F (36.8 °C)  Heart Rate:  [59-71] 63  Resp:  [18] 18  BP: ()/(47-64) 102/59  Flow (L/min):  [2] 2    Physical Exam  Awake, conversant, NAD   Breathing comfortably on room air today no wheezing  rrr  Stable appearing postoperative wound    Result Review:  Vital signs, labs and recent relevant imaging reviewed.      acetaminophen, 1,000 mg, Oral, Q8H  apixaban, 5 mg, Oral, Q12H  carvedilol, 3.125 mg, Oral, BID With Meals  dilTIAZem CD, 240 mg, Oral, Daily  [Held by provider] furosemide, 20 mg, Oral, Daily  insulin lispro, 2-7 Units, Subcutaneous, 4x Daily AC & at Bedtime  [Held by provider] losartan, 25 mg, Oral, Daily  pantoprazole, 40 mg, Oral, QAM  polyethylene glycol, 17 g, Oral, BID  senna-docusate sodium, 1 tablet, Oral, BID          dextrose    dextrose    glucagon (human recombinant)    HYDROcodone-acetaminophen    HYDROcodone-acetaminophen    lactated ringers    magnesium hydroxide    Morphine **AND** naloxone    ondansetron ODT **OR** ondansetron      Assessment / Plan     Assessment/Plan:  Severe osteoarthritis s/p right total knee surgery on 1/17/2024  Postoperative Hypoxia and atelectasis  Hypotension with hx of HTN  DM2, on metformin outpt, A1c 6.5  HTN  Afib, on apixaban outpt  Borderline reduced GFR outpatient   GERD     Bowel regimen  AC changed back to patients home apixaban 5mg bid per d/w ortho,   Hold home coreg / dilt this am given softer bp, finish fluids, increase po, reassess later, suspect decrease hb and pain medications contributing, hb down  to 9.6 from 12.3, suspect from surgical loss  Hold home losartan as well given hypotension  Repeat hnh in am, iron studies  Weaned off oxygen, lower normal on room air, cont respiratory hygiene  SSI for diabetes, monitor glucose  A1c   Monitor for sedation while on opiates   cont to monitor vitals/o2  pt/ot  continue postoperative wound care   Repeat hnh in am and iron studies   Home when ok per surgery    D/w jasmyne, rn    DVT prophylaxis:  Medical and mechanical DVT prophylaxis orders are present.    Code Status (Patient has no pulse and is not breathing): CPR (Attempt to Resuscitate)  Medical Interventions (Patient has pulse or is breathing): Full Support        CBC          10/24/2023    10:42 1/9/2024    09:05 1/18/2024    05:24   CBC   WBC 8.45     5.76     RBC 3.90     3.84     Hemoglobin 12.2     12.3  9.6    Hematocrit 39.2     38.0  30.0    .5     99.0     MCH 31.3     32.0     MCHC 31.1     32.4     RDW 14.0     13.8     Platelets 323     268        Details          This result is from an external source.               CMP          12/21/2023    09:54 1/9/2024    09:05 1/18/2024    05:24   CMP   Glucose 93  108  138    BUN 16  18  15    Creatinine 0.81  0.96  0.78    EGFR 73.5  59.9  76.9    Sodium 145  141  137    Potassium 4.4  4.4  4.0    Chloride 107  105  105    Calcium 9.1  9.2  8.6    Total Protein 6.3  6.5     Albumin 4.0  4.0     Globulin 2.3  2.5     Total Bilirubin 0.3  0.2     Alkaline Phosphatase 60  62     AST (SGOT) 20  14     ALT (SGPT) 17  11     Albumin/Globulin Ratio 1.7  1.6     BUN/Creatinine Ratio 19.8  18.8  19.2    Anion Gap 12.6  11.3  9.4

## 2024-01-19 LAB
FERRITIN SERPL-MCNC: 52.91 NG/ML (ref 13–150)
GLUCOSE BLDC GLUCOMTR-MCNC: 109 MG/DL (ref 70–99)
GLUCOSE BLDC GLUCOMTR-MCNC: 117 MG/DL (ref 70–99)
GLUCOSE BLDC GLUCOMTR-MCNC: 134 MG/DL (ref 70–99)
GLUCOSE BLDC GLUCOMTR-MCNC: 176 MG/DL (ref 70–99)
HCT VFR BLD AUTO: 28.9 % (ref 34–46.6)
HGB BLD-MCNC: 9.2 G/DL (ref 12–15.9)
IRON 24H UR-MRATE: 18 MCG/DL (ref 37–145)
IRON SATN MFR SERPL: 6 % (ref 20–50)
TIBC SERPL-MCNC: 298 MCG/DL (ref 298–536)
TRANSFERRIN SERPL-MCNC: 200 MG/DL (ref 200–360)

## 2024-01-19 PROCEDURE — A9270 NON-COVERED ITEM OR SERVICE: HCPCS | Performed by: ORTHOPAEDIC SURGERY

## 2024-01-19 PROCEDURE — 84466 ASSAY OF TRANSFERRIN: CPT | Performed by: INTERNAL MEDICINE

## 2024-01-19 PROCEDURE — 63710000001 HYDROCODONE-ACETAMINOPHEN 7.5-325 MG TABLET: Performed by: ORTHOPAEDIC SURGERY

## 2024-01-19 PROCEDURE — 63710000001 ACETAMINOPHEN EXTRA STRENGTH 500 MG TABLET: Performed by: ORTHOPAEDIC SURGERY

## 2024-01-19 PROCEDURE — 82728 ASSAY OF FERRITIN: CPT | Performed by: INTERNAL MEDICINE

## 2024-01-19 PROCEDURE — A9270 NON-COVERED ITEM OR SERVICE: HCPCS | Performed by: INTERNAL MEDICINE

## 2024-01-19 PROCEDURE — 63710000001 INSULIN LISPRO (HUMAN) PER 5 UNITS: Performed by: INTERNAL MEDICINE

## 2024-01-19 PROCEDURE — 85014 HEMATOCRIT: CPT | Performed by: INTERNAL MEDICINE

## 2024-01-19 PROCEDURE — 63710000001 SENNOSIDES-DOCUSATE 8.6-50 MG TABLET: Performed by: INTERNAL MEDICINE

## 2024-01-19 PROCEDURE — 83540 ASSAY OF IRON: CPT | Performed by: INTERNAL MEDICINE

## 2024-01-19 PROCEDURE — 63710000001 POLYETHYLENE GLYCOL 17 G PACK: Performed by: INTERNAL MEDICINE

## 2024-01-19 PROCEDURE — 97112 NEUROMUSCULAR REEDUCATION: CPT

## 2024-01-19 PROCEDURE — 63710000001 APIXABAN 5 MG TABLET: Performed by: ORTHOPAEDIC SURGERY

## 2024-01-19 PROCEDURE — 63710000001 PANTOPRAZOLE 40 MG TABLET DELAYED-RELEASE: Performed by: INTERNAL MEDICINE

## 2024-01-19 PROCEDURE — 85018 HEMOGLOBIN: CPT | Performed by: INTERNAL MEDICINE

## 2024-01-19 PROCEDURE — 82948 REAGENT STRIP/BLOOD GLUCOSE: CPT | Performed by: INTERNAL MEDICINE

## 2024-01-19 PROCEDURE — 97150 GROUP THERAPEUTIC PROCEDURES: CPT

## 2024-01-19 PROCEDURE — 82948 REAGENT STRIP/BLOOD GLUCOSE: CPT

## 2024-01-19 PROCEDURE — 94799 UNLISTED PULMONARY SVC/PX: CPT

## 2024-01-19 PROCEDURE — 99214 OFFICE O/P EST MOD 30 MIN: CPT | Performed by: INTERNAL MEDICINE

## 2024-01-19 PROCEDURE — 97116 GAIT TRAINING THERAPY: CPT

## 2024-01-19 PROCEDURE — 63710000001 CARVEDILOL 3.125 MG TABLET: Performed by: INTERNAL MEDICINE

## 2024-01-19 PROCEDURE — 97530 THERAPEUTIC ACTIVITIES: CPT

## 2024-01-19 RX ORDER — NALOXONE HYDROCHLORIDE 4 MG/.1ML
SPRAY NASAL
Qty: 2 EACH | Refills: 0 | Status: SHIPPED | OUTPATIENT
Start: 2024-01-19

## 2024-01-19 RX ORDER — OXYCODONE HYDROCHLORIDE 5 MG/1
5 TABLET ORAL EVERY 4 HOURS PRN
Status: COMPLETED | OUTPATIENT
Start: 2024-01-20 | End: 2024-01-20

## 2024-01-19 RX ORDER — OXYCODONE HYDROCHLORIDE 5 MG/1
10 TABLET ORAL EVERY 4 HOURS PRN
Status: DISCONTINUED | OUTPATIENT
Start: 2024-01-20 | End: 2024-01-20 | Stop reason: HOSPADM

## 2024-01-19 RX ORDER — FERROUS SULFATE 325(65) MG
325 TABLET ORAL
Status: DISCONTINUED | OUTPATIENT
Start: 2024-01-20 | End: 2024-01-20 | Stop reason: HOSPADM

## 2024-01-19 RX ORDER — FERROUS SULFATE 325(65) MG
325 TABLET ORAL
Qty: 90 TABLET | Refills: 0 | Status: SHIPPED | OUTPATIENT
Start: 2024-01-19

## 2024-01-19 RX ORDER — POLYETHYLENE GLYCOL 3350 17 G/17G
17 POWDER, FOR SOLUTION ORAL 2 TIMES DAILY PRN
Qty: 238 G | Refills: 0 | Status: SHIPPED | OUTPATIENT
Start: 2024-01-19

## 2024-01-19 RX ORDER — HYDROCODONE BITARTRATE AND ACETAMINOPHEN 7.5; 325 MG/1; MG/1
1-2 TABLET ORAL EVERY 4 HOURS PRN
Qty: 40 TABLET | Refills: 0 | Status: SHIPPED | OUTPATIENT
Start: 2024-01-19

## 2024-01-19 RX ADMIN — HYDROCODONE BITARTRATE AND ACETAMINOPHEN 2 TABLET: 7.5; 325 TABLET ORAL at 11:36

## 2024-01-19 RX ADMIN — APIXABAN 5 MG: 5 TABLET, FILM COATED ORAL at 20:09

## 2024-01-19 RX ADMIN — PANTOPRAZOLE SODIUM 40 MG: 40 TABLET, DELAYED RELEASE ORAL at 06:12

## 2024-01-19 RX ADMIN — DOCUSATE SODIUM 50MG AND SENNOSIDES 8.6MG 1 TABLET: 8.6; 5 TABLET, FILM COATED ORAL at 20:09

## 2024-01-19 RX ADMIN — ACETAMINOPHEN 1000 MG: 500 TABLET ORAL at 15:46

## 2024-01-19 RX ADMIN — DOCUSATE SODIUM 50MG AND SENNOSIDES 8.6MG 1 TABLET: 8.6; 5 TABLET, FILM COATED ORAL at 08:22

## 2024-01-19 RX ADMIN — POLYETHYLENE GLYCOL 3350 17 G: 17 POWDER, FOR SOLUTION ORAL at 08:22

## 2024-01-19 RX ADMIN — ACETAMINOPHEN 1000 MG: 500 TABLET ORAL at 08:21

## 2024-01-19 RX ADMIN — HYDROCODONE BITARTRATE AND ACETAMINOPHEN 1 TABLET: 7.5; 325 TABLET ORAL at 02:32

## 2024-01-19 RX ADMIN — APIXABAN 5 MG: 5 TABLET, FILM COATED ORAL at 08:21

## 2024-01-19 RX ADMIN — INSULIN LISPRO 2 UNITS: 100 INJECTION, SOLUTION INTRAVENOUS; SUBCUTANEOUS at 20:16

## 2024-01-19 RX ADMIN — CARVEDILOL 3.12 MG: 3.12 TABLET, FILM COATED ORAL at 17:35

## 2024-01-19 RX ADMIN — CARVEDILOL 3.12 MG: 3.12 TABLET, FILM COATED ORAL at 08:21

## 2024-01-19 RX ADMIN — HYDROCODONE BITARTRATE AND ACETAMINOPHEN 1 TABLET: 7.5; 325 TABLET ORAL at 07:15

## 2024-01-19 RX ADMIN — HYDROCODONE BITARTRATE AND ACETAMINOPHEN 1 TABLET: 7.5; 325 TABLET ORAL at 20:09

## 2024-01-19 NOTE — CONSULTS
Discharge Planning Assessment  [facility]     Patient Name: Ely Coats  MRN: [id number]  Today's Date: 1/19/2024    Admit Date: 1/17/2024     Home Medical Care       Service Provider Request Status Selected Services Address Phone Fax Patient Preferred    [facility], [place] Accepted N/A [address] [address] [phone] --                         Expected Discharge Date and Time       Expected Discharge Date Expected Discharge Time    Jan 19, 2024         AYDIN Black

## 2024-01-19 NOTE — PROGRESS NOTES
[facility]   Hospitalist Progress Note    Date of admission: 1/17/2024  Patient Name: Ely Coats  1945  Date: 1/19/2024      Subjective     Follow up from surgery/medical management of co-morbidities     Interval Followup: pt still feeling week, has some pain concerns and mobility concerns     Objective     Vitals:   Temp:  [98.1 °F (36.7 °C)-98.9 °F (37.2 °C)] 98.1 °F (36.7 °C)  Heart Rate:  [63-88] 82  Resp:  [18-20] 18  BP: (102-148)/(49-84) 148/84    Physical Exam  Awake, conversant, NAD in chair  Breathing comfortably on room air today no wheezing  rrr  Stable appearing postoperative wound    Result Review:  Vital signs, labs and recent relevant imaging reviewed.      acetaminophen, 1,000 mg, Oral, Q8H  apixaban, 5 mg, Oral, Q12H  carvedilol, 3.125 mg, Oral, BID With Meals  dilTIAZem CD, 240 mg, Oral, Daily  [START ON 1/20/2024] ferrous sulfate, 325 mg, Oral, Daily With Breakfast  [Held by provider] furosemide, 20 mg, Oral, Daily  insulin lispro, 2-7 Units, Subcutaneous, 4x Daily AC & at Bedtime  [Held by provider] losartan, 25 mg, Oral, Daily  pantoprazole, 40 mg, Oral, QAM  polyethylene glycol, 17 g, Oral, BID  senna-docusate sodium, 1 tablet, Oral, BID          dextrose    dextrose    glucagon (human recombinant)    HYDROcodone-acetaminophen    HYDROcodone-acetaminophen    lactated ringers    magnesium hydroxide    Morphine **AND** naloxone    ondansetron ODT **OR** ondansetron      Assessment / Plan     Assessment/Plan:  Severe osteoarthritis s/p right total knee surgery on 1/17/2024  Postoperative Hypoxia and atelectasis  Hypotension with hx of HTN  DM2, on metformin outpt, A1c 6.5  HTN  Afib, on apixaban outpt  Borderline reduced GFR outpatient   GERD  Iron deficiency anemia    Ok to continue on pt's apixaban for afib, continuing coreg, diltiazem, bp improving  Hold losartan for now, monitor bp, likely can resume in 1-2 days   Hb 9.2 reasonable on repeat, cont on iron, tsat low,    Cont resp hygiene  SSI for diabetes, monitor glucose    Monitor for sedation while on opiates   cont to monitor vitals/o2  pt/ot  continue postoperative wound care   Home when ok per surgery, recommending monitoring additional day, pt also concerned about going home as she is the primary caregiver for her , but I'm not sure who is otherwise taking care of him while he is here    D/w jasmyne rn, c/s recs reviewed     DVT prophylaxis:  Medical and mechanical DVT prophylaxis orders are present.    Code Status (Patient has no pulse and is not breathing): CPR (Attempt to Resuscitate)  Medical Interventions (Patient has pulse or is breathing): Full Support        CBC          1/9/2024    09:05 1/18/2024    05:24 1/19/2024    04:19   CBC   WBC 5.76      RBC 3.84      Hemoglobin 12.3  9.6  9.2    Hematocrit 38.0  30.0  28.9    MCV 99.0      MCH 32.0      MCHC 32.4      RDW 13.8      Platelets 268          CMP          12/21/2023    09:54 1/9/2024    09:05 1/18/2024    05:24   CMP   Glucose 93  108  138    BUN 16  18  15    Creatinine 0.81  0.96  0.78    EGFR 73.5  59.9  76.9    Sodium 145  141  137    Potassium 4.4  4.4  4.0    Chloride 107  105  105    Calcium 9.1  9.2  8.6    Total Protein 6.3  6.5     Albumin 4.0  4.0     Globulin 2.3  2.5     Total Bilirubin 0.3  0.2     Alkaline Phosphatase 60  62     AST (SGOT) 20  14     ALT (SGPT) 17  11     Albumin/Globulin Ratio 1.7  1.6     BUN/Creatinine Ratio 19.8  18.8  19.2    Anion Gap 12.6  11.3  9.4

## 2024-01-19 NOTE — THERAPY TREATMENT NOTE
Patient Name: Ely Coats  : 1945    MRN: [id number]                              Today's Date: 2024       Admit Date: 2024    Visit Dx:     ICD-10-CM ICD-9-CM   1. Difficulty in walking  R26.2 719.7   2. Primary osteoarthritis of right knee  M17.11 715.16   3. Decreased activities of daily living (ADL)  Z78.9 V49.89   4. Primary localized osteoarthritis of right knee  M17.11 715.16     Patient Active Problem List   Diagnosis    Bilateral primary osteoarthritis of knee    Pain in both knees    Essential hypertension    Paroxysmal atrial fibrillation    S/P ERCP    Hyperlipidemia    Right hip pain    Greater trochanteric bursitis of right hip    Primary localized osteoarthritis of right knee    Primary osteoarthritis of right knee     Past Medical History:   Diagnosis Date    Atrial fibrillation with RVR     FOLLOWS W/LINK, NO RECENT ISSUES, NO CP    Calculus of gallbladder with acute on chronic cholecystitis with obstruction 2022    Cancer     SKIN CANCER REMOVED    Colon polyp     Contusion of face     Diabetes mellitus     BS IN  THE A.M. 120 ON AVERAGE    Essential hypertension     CONT W/MEDS    Fall     Gallstones     Hyperlipidemia     Kidney stone     Osteoarthritis     Osteopenia     Renal insufficiency     due to nsaids (2017 states labs wnl) NO CURRENT PROBLEMS    Snoring     NO CPAP    Wound infection after surgery 2022     Past Surgical History:   Procedure Laterality Date    BLEPHAROPLASTY Bilateral     CARDIAC CATHETERIZATION Left     [facility], Dr. Cooper Ramires, NO INTERVENTION    CHOLECYSTECTOMY N/A 2022    Procedure: CHOLECYSTECTOMY LAPAROSCOPIC;  Surgeon: Josue Jernigan MD;  Location: [facility] MAIN OR;  Service: General;  Laterality: N/A;    COLONOSCOPY      negative    COLONOSCOPY      polyp-repeat     CYSTOSCOPY  2016    negative- Dr. Murphy per patient urethral polyp noted    ERCP N/A 2022    Procedure:  ENDOSCOPIC RETROGRADE CHOLANGIOPANCREATOGRAPHY WITH SPHINCTEROTOMY, BALLOON SWEEP, COMMON BILE DUCT STENT PLACED, PANCREATIC DUCT STENT PLACED;  Surgeon: Gurvinder Perry MD;  Location: [facility] ENDOSCOPY;  Service: Gastroenterology;  Laterality: N/A;  BILE DUCT STONES    ERCP N/A 5/10/2022    Procedure: ENDOSCOPIC RETROGRADE CHOLANGIOPANCREATOGRAPHY WITH STENT REMOVAL X2, SPHINCTEROTOMY, 12-15MM BALLOON SWEEP WITH BILE DUCT STONE REMOVAL;  Surgeon: Gurvinder Perry MD;  Location: [facility] ENDOSCOPY;  Service: Gastroenterology;  Laterality: N/A;  BILE DUCT STONES    SKIN CANCER EXCISION  04/2016    basal cell on back    TOTAL KNEE ARTHROPLASTY Right 1/17/2024    Procedure: RIGHT TOTAL KNEE ARTHROPLASTY WITH CLARISSA ROBOT;  Surgeon: Geraldo Gann MD;  Location: [facility] MAIN OR;  Service: Robotics - Ortho;  Laterality: Right;    TUBAL ABDOMINAL LIGATION      WRIST FRACTURE SURGERY Left     ORIF      General Information       Row Name 01/19/24 0932          OT Time and Intention    Document Type therapy note (daily note)  -EG     Mode of Treatment individual therapy;occupational therapy  -EG       Row Name 01/19/24 0932          General Information    Existing Precautions/Restrictions fall  -EG       Row Name 01/19/24 0932          Safety Issues, Functional Mobility    Impairments Affecting Function (Mobility) balance;pain;range of motion (ROM);strength  -EG               User Key  (r) = Recorded By, (t) = Taken By, (c) = Cosigned By      Initials Name Provider Type    EG Yolette Alarcon OT Occupational Therapist                     Mobility/ADL's       Row Name 01/19/24 0932          Bed Mobility    Comment, (Bed Mobility) up in chair upon OT arrival  -EG       Row Name 01/19/24 0932          Transfers    Transfers sit-stand transfer;stand-sit transfer  -EG     Comment, (Transfers) transfers in and out of recliner chair w/RW; CGA  -EG       Row Name 01/19/24 0932          Sit-Stand Transfer    Sit-Stand  [place] (Transfers) contact guard  -EG     Assistive Device (Sit-Stand Transfers) walker, front-wheeled  -EG     Comment, (Sit-Stand Transfer) 3x5 for repetitive functional exercise and carryover on hand placement  -EG       Row Name 01/19/24 0932          Stand-Sit Transfer    Stand-Sit [place] (Transfers) contact guard  -EG     Assistive Device (Stand-Sit Transfers) walker, front-wheeled  -EG     Comment, (Stand-Sit Transfer) 3x5 for repetitive functional exercise and carryover on hand placement  -EG       [facility] Name 01/19/24 0932          Functional Mobility    Functional Mobility- Ind. Level contact guard assist  -EG     Functional Mobility- Device walker, front-wheeled  -EG     Functional Mobility- Comment Patient able to perform functional mobility in room and down hallways with RW CGA, OT educated on safe use of AD  -EG       [facility] Name 01/19/24 0932          Activities of Daily Living    BADL Assessment/Intervention --  Patient decline to sponge bath or change clothes this date; OT did educated and simulate LB dressing with patient again for increased carryover prior to discharge  -EG       [facility] Name 01/19/24 0932          Mobility    Extremity Weight-bearing Status right lower extremity  -EG     Right Lower Extremity (Weight-bearing Status) weight-bearing as tolerated (WBAT)  -EG               User Key  (r) = Recorded By, (t) = Taken By, (c) = Cosigned By      Initials Name Provider Type    EG Yolette Alarcon OT Occupational Therapist                   Obj/Interventions       [facility] Name 01/19/24 0937          Sensory Assessment (Somatosensory)    Sensory Assessment (Somatosensory) sensation intact  -EG       [facility] Name 01/19/24 0937          Vision Assessment/Intervention    Visual Impairment/Limitations WFL  -EG       [facility] Name 01/19/24 0937          Motor Skills    Motor Skills functional endurance  -EG     Functional Endurance fair- on room air  -EG       [facility] Name 01/19/24 0937          Balance     Balance Interventions standing;supported;sit to stand;static;dynamic;occupation based/functional task;weight shifting activity;UE activity with balance activity  -EG     Comment, Balance Patient participated in standing tasks at edge of recliner to promote balance and endurance; Patient able to stand unsupported with minimal weight shifting and [place] education/training  -EG               User Key  (r) = Recorded By, (t) = Taken By, (c) = Cosigned By      Initials Name Provider Type    EG Yolette Alarcon OT Occupational Therapist                   Goals/Plan    No documentation.                  Clinical Impression       Row Name 01/19/24 0939          Pain Assessment    Pretreatment Pain Rating 8/10  -EG     Posttreatment Pain Rating 8/10  -EG     Pain Location - Side/Orientation Right  -EG     Pain Location - knee  -EG       Row Name 01/19/24 0939          Plan of Care Review    Plan of Care Reviewed With patient  -EG     Progress improving  -EG     Outcome Evaluation Patient is pleasant and cooperative with increased complaints of pain this date; Requires slow pace of performance for tasks; OT services reccomends continued treatment and POC follow through due to limitations noted in all performance skill areas. Ax1 w/RW.  -EG       Row Name 01/19/24 0939          Therapy Assessment/Plan (OT)    Rehab Potential (OT) good, to achieve stated therapy goals  -EG     Criteria for Skilled Therapeutic Interventions Met (OT) yes;meets criteria;skilled treatment is necessary  -EG     Therapy Frequency (OT) 5 times/wk  -EG       Row Name 01/19/24 0939          Positioning and Restraints    Post Treatment Position chair  -EG     In Chair reclined;call light within reach;encouraged to call for assist;exit alarm on  -EG               User Key  (r) = Recorded By, (t) = Taken By, (c) = Cosigned By      Initials Name Provider Type    EG Yolette Alarcon, AMPARO Occupational Therapist                   Outcome Measures       Row Name  01/19/24 0940          How much help from another is currently needed...    Putting on and taking off regular lower body clothing? 2  -EG     Bathing (including washing, rinsing, and drying) 3  -EG     Toileting (which includes using toilet bed pan or urinal) 3  -EG     Putting on and taking off regular upper body clothing 4  -EG     Taking care of personal grooming (such as brushing teeth) 4  -EG     Eating meals 4  -EG     AM-PAC 6 Clicks Score (OT) 20  -EG       Row Name 01/19/24 0800          How much help from another person do you currently need...    Turning from your back to your side while in flat bed without using bedrails? 3  -JS     Moving from lying on back to sitting on the side of a flat bed without bedrails? 3  -JS     Moving to and from a bed to a chair (including a wheelchair)? 3  -JS     Standing up from a chair using your arms (e.g., wheelchair, bedside chair)? 3  -JS     Climbing 3-5 steps with a railing? 3  -JS     To walk in hospital room? 3  -JS     AM-PAC 6 Clicks Score (PT) 18  -JS     Highest Level of Mobility Goal 6 --> Walk 10 steps or more  -       Row Name 01/19/24 0940          Functional Assessment    Outcome Measure Options AM-PAC 6 Clicks Daily Activity (OT);Optimal Instrument  -EG       Row Name 01/19/24 0940          Optimal Instrument    Optimal Instrument Optimal - 3  -EG     Bending/Stooping 3  -EG     Standing 2  -EG     Reaching 1  -EG               User Key  (r) = Recorded By, (t) = Taken By, (c) = Cosigned By      Initials Name Provider Type    Livier Alejandre, RN Registered Nurse    Yolette Moore OT Occupational Therapist                    Occupational Therapy Education       Title: PT OT SLP Therapies (Done)       Topic: Occupational Therapy (Done)       Point: ADL training (Done)       Description:   Instruct learner(s) on proper safety adaptation and remediation techniques during self care or transfers.   Instruct in proper use of assistive devices.                   Learning Progress Summary             Patient Acceptance, E, VU,NR by RK at 1/19/2024 0210    Acceptance, E, VU by RADHA at 1/18/2024 1047    Comment: WBAT  Need for staff assistance for all standing ADL/transfers  Safe transfer techniques  Safe positioning for ADLs  Adaptive techniques for lower body ADLs  ADL home safety/ adaptive equipment recommendations                         Point: Home exercise program (Done)       Description:   Instruct learner(s) on appropriate technique for monitoring, assisting and/or progressing therapeutic exercises/activities.                  Learning Progress Summary             Patient Acceptance, E, VU,NR by RK at 1/19/2024 0210    Acceptance, E, VU by RADHA at 1/18/2024 1047    Comment: WBAT  Need for staff assistance for all standing ADL/transfers  Safe transfer techniques  Safe positioning for ADLs  Adaptive techniques for lower body ADLs  ADL home safety/ adaptive equipment recommendations                         Point: Precautions (Done)       Description:   Instruct learner(s) on prescribed precautions during self-care and functional transfers.                  Learning Progress Summary             Patient Acceptance, E, VU,NR by RK at 1/19/2024 0210    Acceptance, E, VU by RADHA at 1/18/2024 1047    Comment: WBAT  Need for staff assistance for all standing ADL/transfers  Safe transfer techniques  Safe positioning for ADLs  Adaptive techniques for lower body ADLs  ADL home safety/ adaptive equipment recommendations                                         User Key       Initials Effective Dates Name Provider Type Discipline    RILEY 01/16/23 -  Mary Perdomo, RN Registered Nurse Nurse    RADHA 06/16/21 -  James Lincoln OT Occupational Therapist OT                  OT Recommendation and Plan  Therapy Frequency (OT): 5 times/wk  Plan of Care Review  Plan of Care Reviewed With: patient  Progress: improving  Outcome Evaluation: Patient is pleasant and cooperative with  increased complaints of pain this date; Requires slow pace of performance for tasks; OT services reccomends continued treatment and POC follow through due to limitations noted in all performance skill areas. Ax1 w/RW.     Time Calculation:         Time Calculation- OT       Row Name 01/19/24 0941             Time Calculation- OT    OT Received On 01/19/24  -EG      OT Goal Re-Cert Due Date 01/27/24  -EG         Timed Charges    96071 -  OT Neuromuscular Reeducation Minutes 8  -EG      79033 - OT Therapeutic Activity Minutes 12  -EG      24991 - OT Self Care/Mgmt Minutes 5  -EG         SNF Occupational Therapy Minutes    Skilled Minutes- OT 25 min  -EG         Total Minutes    Timed Charges Total Minutes 25  -EG       Total Minutes 25  -EG                User Key  (r) = Recorded By, (t) = Taken By, (c) = Cosigned By      Initials Name Provider Type    EG Yolette Alarcon OT Occupational Therapist                  Therapy Charges for Today       Code Description Service Date Service Provider Modifiers Qty    84336139558 HC OT NEUROMUSC RE EDUCATION EA 15 MIN 1/19/2024 Yolette Alarcon OT GO 1    58986809361 HC OT THERAPEUTIC ACT EA 15 MIN 1/19/2024 Yolette Alarcon OT GO 1                 Yolette Alarcon OT  1/19/2024

## 2024-01-19 NOTE — THERAPY TREATMENT NOTE
Acute Care - Physical Therapy Treatment Note   T[facility]     Patient Name: Ely Coats  : 1945  MRN: [id number]  Today's Date: 2024      Visit Dx:     ICD-10-CM ICD-9-CM   1. Difficulty in walking  R26.2 719.7   2. Primary osteoarthritis of right knee  M17.11 715.16   3. Decreased activities of daily living (ADL)  Z78.9 V49.89   4. Primary localized osteoarthritis of right knee  M17.11 715.16     Patient Active Problem List   Diagnosis    Bilateral primary osteoarthritis of knee    Pain in both knees    Essential hypertension    Paroxysmal atrial fibrillation    S/P ERCP    Hyperlipidemia    Right hip pain    Greater trochanteric bursitis of right hip    Primary localized osteoarthritis of right knee    Primary osteoarthritis of right knee     Past Medical History:   Diagnosis Date    Atrial fibrillation with RVR     FOLLOWS W/LINK, NO RECENT ISSUES, NO CP    Calculus of gallbladder with acute on chronic cholecystitis with obstruction 2022    Cancer     SKIN CANCER REMOVED    Colon polyp     Contusion of face     Diabetes mellitus     BS IN  THE A.M. 120 ON AVERAGE    Essential hypertension     CONT W/MEDS    Fall     Gallstones     Hyperlipidemia     Kidney stone     Osteoarthritis     Osteopenia     Renal insufficiency     due to nsaids (2017 states labs wnl) NO CURRENT PROBLEMS    Snoring     NO CPAP    Wound infection after surgery 2022     Past Surgical History:   Procedure Laterality Date    BLEPHAROPLASTY Bilateral     CARDIAC CATHETERIZATION Left     [facility], Dr. Cooper Ramires, NO INTERVENTION    CHOLECYSTECTOMY N/A 2022    Procedure: CHOLECYSTECTOMY LAPAROSCOPIC;  Surgeon: Josue Jernigan MD;  Location: [facility] MAIN OR;  Service: General;  Laterality: N/A;    COLONOSCOPY      negative    COLONOSCOPY      polyp-repeat     CYSTOSCOPY  2016    negative- Dr. Murphy per patient urethral polyp noted    ERCP N/A 2022    Procedure:  ENDOSCOPIC RETROGRADE CHOLANGIOPANCREATOGRAPHY WITH SPHINCTEROTOMY, BALLOON SWEEP, COMMON BILE DUCT STENT PLACED, PANCREATIC DUCT STENT PLACED;  Surgeon: Gurvinder Perry MD;  Location: [facility] ENDOSCOPY;  Service: Gastroenterology;  Laterality: N/A;  BILE DUCT STONES    ERCP N/A 5/10/2022    Procedure: ENDOSCOPIC RETROGRADE CHOLANGIOPANCREATOGRAPHY WITH STENT REMOVAL X2, SPHINCTEROTOMY, 12-15MM BALLOON SWEEP WITH BILE DUCT STONE REMOVAL;  Surgeon: Gurvinder Perry MD;  Location: [facility] ENDOSCOPY;  Service: Gastroenterology;  Laterality: N/A;  BILE DUCT STONES    SKIN CANCER EXCISION  04/2016    basal cell on back    TOTAL KNEE ARTHROPLASTY Right 1/17/2024    Procedure: RIGHT TOTAL KNEE ARTHROPLASTY WITH CLARISSA ROBOT;  Surgeon: Geraldo Gann MD;  Location: [facility] MAIN OR;  Service: Robotics - Ortho;  Laterality: Right;    TUBAL ABDOMINAL LIGATION      WRIST FRACTURE SURGERY Left     ORIF     PT Assessment (last 12 hours)       PT Evaluation and Treatment       Row Name 01/19/24 1010          Physical Therapy Time and Intention    Subjective Information complains of;pain  -VK     Document Type therapy note (daily note)  -VK     Mode of Treatment individual therapy;group therapy;physical therapy  -VK     Patient Effort good  -VK     Comment Gait training performed individually; therapeutic exercises performed in a group setting with 3 participants  -VK       Row Name 01/19/24 1010          General Information    Patient Profile Reviewed yes  -VK     Patient Observations alert;cooperative;agree to therapy  -VK       Row Name 01/19/24 1010          Pain    Posttreatment Pain Rating 7/10  -VK     Pain Location - Side/Orientation Right  -VK     Pain Location - knee  -VK       Row Name 01/19/24 1010          Cognition    Affect/Mental Status (Cognition) WFL  -VK     Personal Safety Interventions fall prevention program maintained;gait belt;nonskid shoes/slippers when out of bed  -VK       Row Name 01/19/24 1010           Range of Motion (ROM)    Range of Motion right lower extremity  -VK       Row Name 01/19/24 1010          Range of Motion Comprehensive    Comment, General Range of Motion Flexion: 90   Extension: -2  -VK       Row Name 01/19/24 1010          Mobility    Extremity Weight-bearing Status right lower extremity  -VK     Right Lower Extremity (Weight-bearing Status) weight-bearing as tolerated (WBAT)  -VK       Row Name 01/19/24 1010          Transfers    Transfers sit-stand transfer;stand-sit transfer;car transfer  -VK       Row Name 01/19/24 1010          Sit-Stand Transfer    Sit-Stand Dexter (Transfers) contact guard  -VK     Assistive Device (Sit-Stand Transfers) walker, front-wheeled  -VK       Row Name 01/19/24 1010          Stand-Sit Transfer    Stand-Sit Dexter (Transfers) contact guard  -VK     Assistive Device (Stand-Sit Transfers) walker, front-wheeled  -VK       Row Name 01/19/24 1010          Car Transfer    Type (Car Transfer) sit-stand;stand-sit  -VK     Dexter Level (Car Transfer) contact guard;minimum assist (75% patient effort)  -VK     Assistive Device (Car Transfer) walker, front-wheeled  -VK       Row Name 01/19/24 1010          Gait/Stairs (Locomotion)    Gait/Stairs Locomotion gait/ambulation assistive device  -VK     Dexter Level (Gait) contact guard  -VK     Assistive Device (Gait) walker, front-wheeled  -VK     Patient was able to Ambulate yes  -VK     Distance in Feet (Gait) 55ft, 65ft  -VK     Pattern (Gait) step-to  -VK     Deviations/Abnormal Patterns (Gait) base of support, narrow;gait speed decreased;stride length decreased  -VK     Right Sided Gait Deviations weight shift ability decreased;heel strike decreased  -VK     Gait Assessment/Intervention Required verbal cues for walker placement, pt demonstrated and understood.  -VK     Dexter Level (Stairs) contact guard  -VK     Assistive Device (Stairs) walker, front-wheeled  -VK     Handrail Location  (Stairs) both sides  -VK     Number of Steps (Stairs) 2x2  -VK     Ascending Technique (Stairs) step-to-step  -VK     Descending Technique (Stairs) step-to-step  -VK     Stairs, Safety Issues sequencing ability decreased;weight-shifting ability decreased  -VK       Row Name 01/19/24 1010          Safety Issues, Functional Mobility    Impairments Affecting Function (Mobility) balance;pain;range of motion (ROM);strength  -VK       Row Name 01/19/24 1010          Balance    Balance Assessment standing dynamic balance  -VK     Dynamic Standing Balance contact guard  -VK     Position/Device Used, Standing Balance walker, front-wheeled  -VK       Row Name 01/19/24 1010          Motor Skills    Therapeutic Exercise knee;hip;ankle  -       Row Name 01/19/24 1010          Hip (Therapeutic Exercise)    Hip (Therapeutic Exercise) isometric exercises  -     Hip Isometrics (Therapeutic Exercise) right;gluteal sets;10 repetitions;2 sets  -       Row Name 01/19/24 1010          Knee (Therapeutic Exercise)    Knee (Therapeutic Exercise) isometric exercises;strengthening exercise  -     Knee Isometrics (Therapeutic Exercise) right;quad sets;10 repetitions;2 sets  -     Knee Strengthening (Therapeutic Exercise) right;heel slides;SLR (straight leg raise);SAQ (short arc quad);LAQ (long arc quad);10 repetitions;2 sets  -       Row Name 01/19/24 1010          Ankle (Therapeutic Exercise)    Ankle (Therapeutic Exercise) AROM (active range of motion)  -     Ankle AROM (Therapeutic Exercise) right;dorsiflexion;plantarflexion;10 repetitions;2 sets  -       Row Name             Wound 01/17/24 1029 Right anterior knee Incision    Wound - Properties Group Placement Date: 01/17/24  -SC Placement Time: 1029  -SC Present on Original Admission: N  -SC Side: Right  -SC Orientation: anterior  -SC Location: knee  -SC Primary Wound Type: Incision  -SC    Retired Wound - Properties Group Placement Date: 01/17/24  -SC Placement Time: 1029   -SC Present on Original Admission: N  -SC Side: Right  -SC Orientation: anterior  -SC Location: knee  -SC Primary Wound Type: Incision  -SC    Retired Wound - Properties Group Date first assessed: 01/17/24  -SC Time first assessed: 1029  -SC Present on Original Admission: N  -SC Side: Right  -SC Location: knee  -SC Primary Wound Type: Incision  -SC      Row Name             Wound 01/17/24 1030 Right proximal leg Puncture    Wound - Properties Group Placement Date: 01/17/24  -SC Placement Time: 1030  -SC Present on Original Admission: N  -SC Side: Right  -SC Orientation: proximal  -SC Location: leg  -SC Primary Wound Type: Puncture  -SC    Retired Wound - Properties Group Placement Date: 01/17/24  -SC Placement Time: 1030  -SC Present on Original Admission: N  -SC Side: Right  -SC Orientation: proximal  -SC Location: leg  -SC Primary Wound Type: Puncture  -SC    Retired Wound - Properties Group Date first assessed: 01/17/24  -SC Time first assessed: 1030  -SC Present on Original Admission: N  -SC Side: Right  -SC Location: leg  -SC Primary Wound Type: Puncture  -SC      Row Name 01/19/24 1010          Positioning and Restraints    Pre-Treatment Position sitting in chair/recliner  -VK     Post Treatment Position chair  -VK     In Chair reclined;call light within reach;encouraged to call for assist;exit alarm on;with nsg  -VK       Row Name 01/19/24 1010          Progress Summary (PT)    Progress Toward Functional Goals (PT) progress toward functional goals is good  -VK     Daily Progress Summary (PT) Pt is progressing well with their exercise program. Will continue current plan of care.  -VK               User Key  (r) = Recorded By, (t) = Taken By, (c) = Cosigned By      Initials Name Provider Type    Martha Cullen, RN Registered Nurse    Velma Schaefer PTA Physical Therapist Assistant                    Physical Therapy Education       Title: PT OT SLP Therapies (Done)       Topic: Physical Therapy  (Done)       Point: Mobility training (Done)       Learning Progress Summary             Patient Acceptance, E, VU by TAMIA at 1/19/2024 1031    Acceptance, E, VU,NR by RILEY at 1/19/2024 0210    Acceptance, E, VU by RADHA at 1/18/2024 1047    Comment: WBAT  Need for staff assistance for all standing ADL/transfers  Safe transfer techniques  Safe positioning for ADLs  Adaptive techniques for lower body ADLs  ADL home safety/ adaptive equipment recommendations    Acceptance, E, VU,NR by RILEY at 1/18/2024 0352    Acceptance, E,TB, VU by GRAYSON at 1/17/2024 1358   Family Acceptance, E, VU by TAMIA at 1/19/2024 1031    Acceptance, E, VU,NR by RILEY at 1/18/2024 0352                         Point: Precautions (Done)       Learning Progress Summary             Patient Acceptance, E, VU by TAMIA at 1/19/2024 1031    Acceptance, E, VU,NR by RILEY at 1/19/2024 0210    Acceptance, E, VU by RADHA at 1/18/2024 1047    Comment: WBAT  Need for staff assistance for all standing ADL/transfers  Safe transfer techniques  Safe positioning for ADLs  Adaptive techniques for lower body ADLs  ADL home safety/ adaptive equipment recommendations    Acceptance, E, VU,NR by RILEY at 1/18/2024 0352    Acceptance, E,TB, VU by GRAYSON at 1/17/2024 1358   Family Acceptance, E, VU by TAMIA at 1/19/2024 1031    Acceptance, E, VU,NR by RILEY at 1/18/2024 0352                                         User Key       Initials Effective Dates Name Provider Type Discipline    RILEY 01/16/23 -  Mary Perdomo, RN Registered Nurse Nurse    TAMIA 09/21/21 -  Livier Dee RN Registered Nurse Nurse    RADHA 06/16/21 -  James Lincoln OT Occupational Therapist OT    GRAYSON 06/03/21 -  Addy Li, BRANDY Physical Therapist PT                  PT Recommendation and Plan     Progress Summary (PT)  Progress Toward Functional Goals (PT): progress toward functional goals is good  Daily Progress Summary (PT): Pt is progressing well with their exercise program. Will continue current plan of care.   Outcome Measures        Row Name 01/19/24 1300 01/18/24 1200 01/17/24 1358       How much help from another person do you currently need...    Turning from your back to your side while in flat bed without using bedrails? 3  -VK 3  -RH 3  -GRAYSON    Moving from lying on back to sitting on the side of a flat bed without bedrails? 3  -VK 3  -RH 3  -GRAYSON    Moving to and from a bed to a chair (including a wheelchair)? 3  -VK 3  -RH 3  -GRAYSON    Standing up from a chair using your arms (e.g., wheelchair, bedside chair)? 3  -VK 3  -RH 3  -GRAYSON    Climbing 3-5 steps with a railing? 3  -VK 3  -RH 3  -GRAYSON    To walk in hospital room? 3  -VK 4  -RH 3  -GRAYSON    AM-PAC 6 Clicks Score (PT) 18  -VK 19  -RH 18  -GRAYSON    Highest Level of Mobility Goal 6 --> Walk 10 steps or more  -VK 6 --> Walk 10 steps or more  -RH 6 --> Walk 10 steps or more  -GRAYSON      Row Name 01/17/24 1302             Functional Assessment    Outcome Measure Options AM-PAC 6 Clicks Basic Mobility (PT)  -GRAYSON                User Key  (r) = Recorded By, (t) = Taken By, (c) = Cosigned By      Initials Name Provider Type    Martell Borden, CONNOR Physical Therapist Assistant    Addy Arias, PT Physical Therapist    Velma Schaefer PTA Physical Therapist Assistant                     Time Calculation:    PT Charges       Row Name 01/19/24 1233             Time Calculation    PT Received On 01/19/24  -VK         Timed Charges    59771 - Gait Training Minutes  10  -VK         Untimed Charges    PT Group Therapy Minutes 30  -VK         Total Minutes    Timed Charges Total Minutes 10  -VK      Untimed Charges Total Minutes 30  -VK       Total Minutes 40  -VK                User Key  (r) = Recorded By, (t) = Taken By, (c) = Cosigned By      Initials Name Provider Type    Velma Schaefer PTA Physical Therapist Assistant                  Therapy Charges for Today       Code Description Service Date Service Provider Modifiers Qty    45931721960 HC GAIT TRAINING EA 15 MIN 1/19/2024 Velma Herrera  PTA GP 1    14335183365 HC PT THER PROC GROUP 1/19/2024 Velma Herrera, PTA GP 1            PT G-Codes  Outcome Measure Options: AM-PAC 6 Clicks Daily Activity (OT), Optimal Instrument  AM-PAC 6 Clicks Score (PT): 18  AM-PAC 6 Clicks Score (OT): 20    Velma Herrera PTA  1/19/2024

## 2024-01-19 NOTE — PLAN OF CARE
Goal Outcome Evaluation:  Plan of Care Reviewed With: patient        Progress: improving  Outcome Evaluation: Pt alert and able to make needs known. Pain controlled with PRN Norco. Pt urinating without difficulty. Pt blood pressure improving as shift proceeded. Pt one person assist with gait belt and rolling walker for transfers, ambulated 110+ft- tolerated well. Pt to d/c home with family providing transport and will be utilizing home health for physical therapy.

## 2024-01-19 NOTE — PLAN OF CARE
Goal Outcome Evaluation:           Progress: no change       Plans to discharge 1/20/24.

## 2024-01-19 NOTE — PROGRESS NOTES
[facility]     Progress Note    Patient Name: Ely Coats  : 1945  MRN: [id number]  Primary Care Physician:  Julius Tijerina MD  Date of admission: 2024    Subjective   Subjective     HPI:  Patient Reports doing well this morning.  Her pain is controlled.  She denies any chest pain or shortness of breath.  Plan for discharge home with home health services    Review of Systems   See HPI    Objective   Objective     Vitals:   Temp:  [98.2 °F (36.8 °C)-98.9 °F (37.2 °C)] 98.9 °F (37.2 °C)  Heart Rate:  [63-88] 88  Resp:  [18-20] 20  BP: ()/(47-62) 115/62  Physical Exam    General: Alert, no acute distress   Chest: Unlabored breathing, cardiovascular: Regular heart rate   Musculoskeletal: Neurovascular intact extremity.  Dressing intact.  Full extension.  Negative Clint.  Positive pulses.    Result Review      Hemoglobin   Date Value Ref Range Status   2024 9.2 (L) 12.0 - 15.9 g/dL Final     Hematocrit   Date Value Ref Range Status   2024 28.9 (L) 34.0 - 46.6 % Final        Result Review:  I have personally reviewed the results from the time of this admission to 2024 07:02 EST and agree with these findings:  [x]  Laboratory  []  Microbiology  []  Radiology  []  EKG/Telemetry   []  Cardiology/Vascular   []  Pathology  []  Old records  []  Other:      Assessment & Plan   Assessment / Plan     Brief Patient Summary:  Ely Coats is a 78 y.o. female who is status post right total knee replacement    Active Hospital Problems:  Active Hospital Problems    Diagnosis     **Primary osteoarthritis of right knee     Primary localized osteoarthritis of right knee      Plan: Weightbearing as tolerated with walker  Pain control  DVT prophylaxis-May resume home anticoagulation  Discharge planning: Home with home health services       DVT prophylaxis:  Medical and mechanical DVT prophylaxis orders are present.    CODE STATUS:   Code Status (Patient has no pulse and is not  breathing): CPR (Attempt to Resuscitate)  Medical Interventions (Patient has pulse or is breathing): Full Support    Disposition:  I expect patient to be discharged when medically able.    Electronically signed by Geraldo Gann MD, 01/19/24, 7:02 AM EST.

## 2024-01-19 NOTE — THERAPY TREATMENT NOTE
Acute Care - Physical Therapy Treatment Note   T[facility]     Patient Name: Ely Coats  : 1945  MRN: [id number]  Today's Date: 2024      Visit Dx:     ICD-10-CM ICD-9-CM   1. Difficulty in walking  R26.2 719.7   2. Primary osteoarthritis of right knee  M17.11 715.16   3. Decreased activities of daily living (ADL)  Z78.9 V49.89   4. Primary localized osteoarthritis of right knee  M17.11 715.16     Patient Active Problem List   Diagnosis    Bilateral primary osteoarthritis of knee    Pain in both knees    Essential hypertension    Paroxysmal atrial fibrillation    S/P ERCP    Hyperlipidemia    Right hip pain    Greater trochanteric bursitis of right hip    Primary localized osteoarthritis of right knee    Primary osteoarthritis of right knee     Past Medical History:   Diagnosis Date    Atrial fibrillation with RVR     FOLLOWS W/LINK, NO RECENT ISSUES, NO CP    Calculus of gallbladder with acute on chronic cholecystitis with obstruction 2022    Cancer     SKIN CANCER REMOVED    Colon polyp     Contusion of face     Diabetes mellitus     BS IN  THE A.M. 120 ON AVERAGE    Essential hypertension     CONT W/MEDS    Fall     Gallstones     Hyperlipidemia     Kidney stone     Osteoarthritis     Osteopenia     Renal insufficiency     due to nsaids (2017 states labs wnl) NO CURRENT PROBLEMS    Snoring     NO CPAP    Wound infection after surgery 2022     Past Surgical History:   Procedure Laterality Date    BLEPHAROPLASTY Bilateral     CARDIAC CATHETERIZATION Left     [facility], Dr. Cooper Ramires, NO INTERVENTION    CHOLECYSTECTOMY N/A 2022    Procedure: CHOLECYSTECTOMY LAPAROSCOPIC;  Surgeon: Josue Jernigan MD;  Location: formerly [facility] MAIN OR;  Service: General;  Laterality: N/A;    COLONOSCOPY      negative    COLONOSCOPY      polyp-repeat     CYSTOSCOPY  2016    negative- Dr. Murphy per patient urethral polyp noted    ERCP N/A 2022    Procedure:  ENDOSCOPIC RETROGRADE CHOLANGIOPANCREATOGRAPHY WITH SPHINCTEROTOMY, BALLOON SWEEP, COMMON BILE DUCT STENT PLACED, PANCREATIC DUCT STENT PLACED;  Surgeon: Gurvinder Perry MD;  Location: [facility] ENDOSCOPY;  Service: Gastroenterology;  Laterality: N/A;  BILE DUCT STONES    ERCP N/A 5/10/2022    Procedure: ENDOSCOPIC RETROGRADE CHOLANGIOPANCREATOGRAPHY WITH STENT REMOVAL X2, SPHINCTEROTOMY, 12-15MM BALLOON SWEEP WITH BILE DUCT STONE REMOVAL;  Surgeon: Gurvinder Perry MD;  Location: [facility] ENDOSCOPY;  Service: Gastroenterology;  Laterality: N/A;  BILE DUCT STONES    SKIN CANCER EXCISION  04/2016    basal cell on back    TOTAL KNEE ARTHROPLASTY Right 1/17/2024    Procedure: RIGHT TOTAL KNEE ARTHROPLASTY WITH CLARISSA ROBOT;  Surgeon: Geraldo Gann MD;  Location: [facility] MAIN OR;  Service: Robotics - Ortho;  Laterality: Right;    TUBAL ABDOMINAL LIGATION      WRIST FRACTURE SURGERY Left     ORIF     PT Assessment (last 12 hours)       PT Evaluation and Treatment       Row Name 01/19/24 1325 01/19/24 1010       Physical Therapy Time and Intention    Subjective Information complains of;pain  -VK complains of;pain  -VK    Document Type therapy note (daily note)  -VK therapy note (daily note)  -VK    Mode of Treatment individual therapy;group therapy;physical therapy  -VK individual therapy;group therapy;physical therapy  -VK    Patient Effort good  -VK good  -VK    Comment Gait training performed individually; therapeutic exercises performed in a group setting with 4 participants  -VK Gait training performed individually; therapeutic exercises performed in a group setting with 3 participants  -VK      Row Name 01/19/24 1325 01/19/24 1010       General Information    Patient Profile Reviewed yes  -VK yes  -VK    Patient Observations alert;cooperative;agree to therapy  -VK alert;cooperative;agree to therapy  -VK      Row Name 01/19/24 1325 01/19/24 1010       Pain    Posttreatment Pain Rating 5/10  -VK 7/10  -VK     Pain Location - Side/Orientation Right  -VK Right  -VK    Pain Location - knee  -VK knee  -VK    Pain Intervention(s) Repositioned;Cold pack  -VK --      Row Name 01/19/24 1325 01/19/24 1010       Cognition    Affect/Mental Status (Cognition) WFL  -VK WFL  -VK    Personal Safety Interventions fall prevention program maintained;gait belt;nonskid shoes/slippers when out of bed  -VK fall prevention program maintained;gait belt;nonskid shoes/slippers when out of bed  -VK      Row Name 01/19/24 1325 01/19/24 1010       Range of Motion (ROM)    Range of Motion left lower extremity  -VK right lower extremity  -VK      Row Name 01/19/24 1010          Range of Motion Comprehensive    Comment, General Range of Motion Flexion: 90   Extension: -2  -VK       Row Name 01/19/24 1325 01/19/24 1010       Mobility    Extremity Weight-bearing Status right lower extremity  -VK right lower extremity  -VK    Right Lower Extremity (Weight-bearing Status) weight-bearing as tolerated (WBAT)  -VK weight-bearing as tolerated (WBAT)  -VK      Row Name 01/19/24 1325 01/19/24 1010       Transfers    Transfers sit-stand transfer;stand-sit transfer  -VK sit-stand transfer;stand-sit transfer;car transfer  -VK      Row Name 01/19/24 1325 01/19/24 1010       Sit-Stand Transfer    Sit-Stand [place] (Transfers) contact guard  -VK contact guard  -VK    Assistive Device (Sit-Stand Transfers) walker, front-wheeled  -VK walker, front-wheeled  -VK      Row Name 01/19/24 1325 01/19/24 1010       Stand-Sit Transfer    Stand-Sit [place] (Transfers) contact guard  -VK contact guard  -VK    Assistive Device (Stand-Sit Transfers) walker, front-wheeled  -VK walker, front-wheeled  -VK      Row Name 01/19/24 1010          Car Transfer    Type (Car Transfer) sit-stand;stand-sit  -VK     [place] Level (Car Transfer) contact guard;minimum assist (75% patient effort)  -VK     Assistive Device (Car Transfer) walker, front-wheeled  -VK       Row Name  01/19/24 1325 01/19/24 1010       Gait/Stairs (Locomotion)    Gait/Stairs Locomotion gait/ambulation assistive device  -VK gait/ambulation assistive device  -VK    [place] Level (Gait) contact guard;verbal cues  -VK contact guard  -VK    Assistive Device (Gait) walker, front-wheeled  -VK walker, front-wheeled  -VK    Patient was able to Ambulate yes  -VK yes  -VK    Distance in Feet (Gait) 55ftx2  -VK 55ft, 65ft  -VK    Pattern (Gait) step-to  -VK step-to  -VK    Deviations/Abnormal Patterns (Gait) base of support, narrow;gait speed decreased;stride length decreased  -VK base of support, narrow;gait speed decreased;stride length decreased  -VK    Right Sided Gait Deviations weight shift ability decreased;heel strike decreased  -VK weight shift ability decreased;heel strike decreased  -VK    Gait Assessment/Intervention -- Required verbal cues for walker placement, pt demonstrated and understood.  -VK    [place] Level (Stairs) -- contact guard  -VK    Assistive Device (Stairs) -- walker, front-wheeled  -VK    Handrail Location (Stairs) -- both sides  -VK    Number of Steps (Stairs) -- 2x2  -VK    Ascending Technique (Stairs) -- step-to-step  -VK    Descending Technique (Stairs) -- step-to-step  -VK    Stairs, Safety Issues -- sequencing ability decreased;weight-shifting ability decreased  -VK      Row Name 01/19/24 1325 01/19/24 1010       Safety Issues, Functional Mobility    Safety Issues Affecting Function (Mobility) impulsivity;insight into deficits/self-awareness;positioning of assistive device  -VK --    Impairments Affecting Function (Mobility) balance;pain;range of motion (ROM);strength  -VK balance;pain;range of motion (ROM);strength  -VK      Row Name 01/19/24 1325 01/19/24 1010       Balance    Balance Assessment standing dynamic balance  -VK standing dynamic balance  -VK    Dynamic Standing Balance -- contact guard  -VK    Position/Device Used, Standing Balance walker, front-wheeled  -VK walker,  front-wheeled  -VK      Row Name 01/19/24 1325 01/19/24 1010       Motor Skills    Therapeutic Exercise knee;hip;ankle  -VK knee;hip;ankle  -VK      Row Name 01/19/24 1325 01/19/24 1010       Hip (Therapeutic Exercise)    Hip (Therapeutic Exercise) isometric exercises  - isometric exercises  -    Hip Isometrics (Therapeutic Exercise) right;gluteal sets;10 repetitions;2 sets  -VK right;gluteal sets;10 repetitions;2 sets  -VK      Row Name 01/19/24 1325 01/19/24 1010       Knee (Therapeutic Exercise)    Knee (Therapeutic Exercise) isometric exercises;strengthening exercise  - isometric exercises;strengthening exercise  -VK    Knee Isometrics (Therapeutic Exercise) right;quad sets;10 repetitions;2 sets  -VK right;quad sets;10 repetitions;2 sets  -VK    Knee Strengthening (Therapeutic Exercise) right;heel slides;SLR (straight leg raise);SAQ (short arc quad);LAQ (long arc quad);10 repetitions;2 sets  -VK right;heel slides;SLR (straight leg raise);SAQ (short arc quad);LAQ (long arc quad);10 repetitions;2 sets  -VK      Row Name 01/19/24 1325 01/19/24 1010       Ankle (Therapeutic Exercise)    Ankle (Therapeutic Exercise) AROM (active range of motion)  - AROM (active range of motion)  -    Ankle AROM (Therapeutic Exercise) right;dorsiflexion;plantarflexion;10 repetitions;2 sets  -VK right;dorsiflexion;plantarflexion;10 repetitions;2 sets  -VK      Row Name             Wound 01/17/24 1029 Right anterior knee Incision    Wound - Properties Group Placement Date: 01/17/24  -SC Placement Time: 1029  -SC Present on Original Admission: N  -SC Side: Right  -SC Orientation: anterior  -SC Location: knee  -SC Primary Wound Type: Incision  -SC    Retired Wound - Properties Group Placement Date: 01/17/24  -SC Placement Time: 1029  -SC Present on Original Admission: N  -SC Side: Right  -SC Orientation: anterior  -SC Location: knee  -SC Primary Wound Type: Incision  -SC    Retired Wound - Properties Group Date first assessed:  01/17/24  -SC Time first assessed: 1029  -SC Present on Original Admission: N  -SC Side: Right  -SC Location: knee  -SC Primary Wound Type: Incision  -SC      Row Name             Wound 01/17/24 1030 Right proximal leg Puncture    Wound - Properties Group Placement Date: 01/17/24  -SC Placement Time: 1030  -SC Present on Original Admission: N  -SC Side: Right  -SC Orientation: proximal  -SC Location: leg  -SC Primary Wound Type: Puncture  -SC    Retired Wound - Properties Group Placement Date: 01/17/24  -SC Placement Time: 1030  -SC Present on Original Admission: N  -SC Side: Right  -SC Orientation: proximal  -SC Location: leg  -SC Primary Wound Type: Puncture  -SC    Retired Wound - Properties Group Date first assessed: 01/17/24  -SC Time first assessed: 1030  -SC Present on Original Admission: N  -SC Side: Right  -SC Location: leg  -SC Primary Wound Type: Puncture  -SC      Row Name 01/19/24 1325 01/19/24 1010       Positioning and Restraints    Pre-Treatment Position sitting in chair/recliner  -VK sitting in chair/recliner  -VK    Post Treatment Position chair  -VK chair  -VK    In Chair reclined;call light within reach;encouraged to call for assist;exit alarm on  -VK reclined;call light within reach;encouraged to call for assist;exit alarm on;with nsg  -VK      Row Name 01/19/24 1325 01/19/24 1010       Progress Summary (PT)    Progress Toward Functional Goals (PT) progress toward functional goals is good  -VK progress toward functional goals is good  -VK    Daily Progress Summary (PT) Pt is progressing well with their exercise program.  Will continue current plan of care.  -VK Pt is progressing well with their exercise program. Will continue current plan of care.  -VK              User Key  (r) = Recorded By, (t) = Taken By, (c) = Cosigned By      Initials Name Provider Type    Martha Cullen, RN Registered Nurse    Velma Schaefer PTA Physical Therapist Assistant                    Physical Therapy  Education       Title: PT OT SLP Therapies (Done)       Topic: Physical Therapy (Done)       Point: Mobility training (Done)       Learning Progress Summary             Patient Acceptance, E, VU by TAMIA at 1/19/2024 1031    Acceptance, E, VU,NR by RILEY at 1/19/2024 0210    Acceptance, E, VU by RADHA at 1/18/2024 1047    Comment: WBAT  Need for staff assistance for all standing ADL/transfers  Safe transfer techniques  Safe positioning for ADLs  Adaptive techniques for lower body ADLs  ADL home safety/ adaptive equipment recommendations    Acceptance, E, VU,NR by RILEY at 1/18/2024 0352    Acceptance, E,TB, VU by GRAYSON at 1/17/2024 1358   Family Acceptance, E, VU by TAMIA at 1/19/2024 1031    Acceptance, E, VU,NR by RILEY at 1/18/2024 0352                         Point: Precautions (Done)       Learning Progress Summary             Patient Acceptance, E, VU by TAMIA at 1/19/2024 1031    Acceptance, E, VU,NR by RILEY at 1/19/2024 0210    Acceptance, E, VU by RADHA at 1/18/2024 1047    Comment: WBAT  Need for staff assistance for all standing ADL/transfers  Safe transfer techniques  Safe positioning for ADLs  Adaptive techniques for lower body ADLs  ADL home safety/ adaptive equipment recommendations    Acceptance, E, VU,NR by RILEY at 1/18/2024 0352    Acceptance, E,TB, VU by GRAYSON at 1/17/2024 1358   Family Acceptance, E, VU by TAMIA at 1/19/2024 1031    Acceptance, E, VU,NR by RILEY at 1/18/2024 0352                                         User Key       Initials Effective Dates Name Provider Type Discipline    RILEY 01/16/23 -  Mary Perdomo, RN Registered Nurse Nurse    TAMIA 09/21/21 -  Livier Dee RN Registered Nurse Nurse    RADHA 06/16/21 -  James Lincoln OT Occupational Therapist OT    GRAYSON 06/03/21 -  Addy Li PT Physical Therapist PT                  PT Recommendation and Plan     Progress Summary (PT)  Progress Toward Functional Goals (PT): progress toward functional goals is good  Daily Progress Summary (PT): Pt is progressing well with  their exercise program.  Will continue current plan of care.   Outcome Measures       Row Name 01/19/24 1500 01/19/24 1300 01/18/24 1200       How much help from another person do you currently need...    Turning from your back to your side while in flat bed without using bedrails? 3  -VK 3  -VK 3  -RH    Moving from lying on back to sitting on the side of a flat bed without bedrails? 3  -VK 3  -VK 3  -RH    Moving to and from a bed to a chair (including a wheelchair)? 3  -VK 3  -VK 3  -RH    Standing up from a chair using your arms (e.g., wheelchair, bedside chair)? 3  -VK 3  -VK 3  -RH    Climbing 3-5 steps with a railing? 3  -VK 3  -VK 3  -RH    To walk in hospital room? 3  -VK 3  -VK 4  -RH    AM-PAC 6 Clicks Score (PT) 18  -VK 18  -VK 19  -RH    Highest Level of Mobility Goal 6 --> Walk 10 steps or more  -VK 6 --> Walk 10 steps or more  -VK 6 --> Walk 10 steps or more  -      Row Name 01/17/24 1358 01/17/24 1302          How much help from another person do you currently need...    Turning from your back to your side while in flat bed without using bedrails? 3  -GRAYSON --     Moving from lying on back to sitting on the side of a flat bed without bedrails? 3  -GRAYSON --     Moving to and from a bed to a chair (including a wheelchair)? 3  -GRAYSON --     Standing up from a chair using your arms (e.g., wheelchair, bedside chair)? 3  -GRAYSON --     Climbing 3-5 steps with a railing? 3  -GRAYSON --     To walk in hospital room? 3  -GRAYSON --     AM-PAC 6 Clicks Score (PT) 18  -GRAYSON --     Highest Level of Mobility Goal 6 --> Walk 10 steps or more  -GRAYSON --        Functional Assessment    Outcome Measure Options -- AM-PAC 6 Clicks Basic Mobility (PT)  -GRAYSON               User Key  (r) = Recorded By, (t) = Taken By, (c) = Cosigned By      Initials Name Provider Type    RH Martell Kang, PTA Physical Therapist Assistant    Addy Arias, PT Physical Therapist    VK Velma Herrera, PTA Physical Therapist Assistant                     Time  Calculation:    PT Charges       Row Name 01/19/24 1533 01/19/24 1233          Time Calculation    PT Received On 01/19/24  -VK 01/19/24  -VK        Timed Charges    10000 - Gait Training Minutes  10  -VK 10  -VK        Untimed Charges    PT Group Therapy Minutes 30  -VK 30  -VK        Total Minutes    Timed Charges Total Minutes 10  -VK 10  -VK     Untimed Charges Total Minutes 30  -VK 30  -VK      Total Minutes 40  -VK 40  -VK               User Key  (r) = Recorded By, (t) = Taken By, (c) = Cosigned By      Initials Name Provider Type    Velma Schaefer, CONNOR Physical Therapist Assistant                  Therapy Charges for Today       Code Description Service Date Service Provider Modifiers Qty    91837376724 HC GAIT TRAINING EA 15 MIN 1/19/2024 Velma Herrera, PTA GP 1    83773520555 HC PT THER PROC GROUP 1/19/2024 Velma Herrera, PTA GP 1    57671866944 HC GAIT TRAINING EA 15 MIN 1/19/2024 Velma Herrera, PTA GP 1    50109085065 HC PT THER PROC GROUP 1/19/2024 Velma Herrera, PTA GP 1            PT G-Codes  Outcome Measure Options: AM-PAC 6 Clicks Daily Activity (OT), Optimal Instrument  AM-PAC 6 Clicks Score (PT): 18  AM-PAC 6 Clicks Score (OT): 20    Velma Herrera PTA  1/19/2024

## 2024-01-20 VITALS
OXYGEN SATURATION: 98 % | WEIGHT: 162.04 LBS | TEMPERATURE: 98.1 F | HEIGHT: 64 IN | DIASTOLIC BLOOD PRESSURE: 68 MMHG | BODY MASS INDEX: 27.66 KG/M2 | HEART RATE: 73 BPM | RESPIRATION RATE: 18 BRPM | SYSTOLIC BLOOD PRESSURE: 133 MMHG

## 2024-01-20 LAB
GLUCOSE BLDC GLUCOMTR-MCNC: 112 MG/DL (ref 70–99)
GLUCOSE BLDC GLUCOMTR-MCNC: 113 MG/DL (ref 70–99)

## 2024-01-20 PROCEDURE — 97116 GAIT TRAINING THERAPY: CPT

## 2024-01-20 PROCEDURE — 63710000001 APIXABAN 5 MG TABLET: Performed by: ORTHOPAEDIC SURGERY

## 2024-01-20 PROCEDURE — 94799 UNLISTED PULMONARY SVC/PX: CPT

## 2024-01-20 PROCEDURE — A9270 NON-COVERED ITEM OR SERVICE: HCPCS | Performed by: INTERNAL MEDICINE

## 2024-01-20 PROCEDURE — 63710000001 ACETAMINOPHEN EXTRA STRENGTH 500 MG TABLET: Performed by: ORTHOPAEDIC SURGERY

## 2024-01-20 PROCEDURE — 63710000001 SENNOSIDES-DOCUSATE 8.6-50 MG TABLET: Performed by: INTERNAL MEDICINE

## 2024-01-20 PROCEDURE — 97150 GROUP THERAPEUTIC PROCEDURES: CPT

## 2024-01-20 PROCEDURE — 82948 REAGENT STRIP/BLOOD GLUCOSE: CPT | Performed by: INTERNAL MEDICINE

## 2024-01-20 PROCEDURE — 63710000001 OXYCODONE 5 MG TABLET

## 2024-01-20 PROCEDURE — A9270 NON-COVERED ITEM OR SERVICE: HCPCS

## 2024-01-20 PROCEDURE — 63710000001 CARVEDILOL 3.125 MG TABLET: Performed by: INTERNAL MEDICINE

## 2024-01-20 PROCEDURE — A9270 NON-COVERED ITEM OR SERVICE: HCPCS | Performed by: ORTHOPAEDIC SURGERY

## 2024-01-20 PROCEDURE — 63710000001 PANTOPRAZOLE 40 MG TABLET DELAYED-RELEASE: Performed by: INTERNAL MEDICINE

## 2024-01-20 PROCEDURE — 63710000001 FERROUS SULFATE 325 (65 FE) MG TABLET: Performed by: INTERNAL MEDICINE

## 2024-01-20 PROCEDURE — 99214 OFFICE O/P EST MOD 30 MIN: CPT | Performed by: INTERNAL MEDICINE

## 2024-01-20 RX ADMIN — DOCUSATE SODIUM 50MG AND SENNOSIDES 8.6MG 1 TABLET: 8.6; 5 TABLET, FILM COATED ORAL at 08:41

## 2024-01-20 RX ADMIN — FERROUS SULFATE TAB 325 MG (65 MG ELEMENTAL FE) 325 MG: 325 (65 FE) TAB at 08:46

## 2024-01-20 RX ADMIN — CARVEDILOL 3.12 MG: 3.12 TABLET, FILM COATED ORAL at 08:41

## 2024-01-20 RX ADMIN — ACETAMINOPHEN 1000 MG: 500 TABLET ORAL at 08:42

## 2024-01-20 RX ADMIN — OXYCODONE 10 MG: 5 TABLET ORAL at 03:15

## 2024-01-20 RX ADMIN — APIXABAN 5 MG: 5 TABLET, FILM COATED ORAL at 08:41

## 2024-01-20 RX ADMIN — PANTOPRAZOLE SODIUM 40 MG: 40 TABLET, DELAYED RELEASE ORAL at 06:55

## 2024-01-20 RX ADMIN — OXYCODONE 5 MG: 5 TABLET ORAL at 13:11

## 2024-01-20 RX ADMIN — OXYCODONE 10 MG: 5 TABLET ORAL at 08:42

## 2024-01-20 NOTE — THERAPY TREATMENT NOTE
Acute Care - Physical Therapy Treatment Note   T[place]     Patient Name: Ely Coats  : 1945  MRN: [id number]  Today's Date: 2024      Visit Dx:     ICD-10-CM ICD-9-CM   1. Difficulty in walking  R26.2 719.7   2. Primary osteoarthritis of right knee  M17.11 715.16   3. Decreased activities of daily living (ADL)  Z78.9 V49.89   4. Primary localized osteoarthritis of right knee  M17.11 715.16     Patient Active Problem List   Diagnosis    Bilateral primary osteoarthritis of knee    Pain in both knees    Essential hypertension    Paroxysmal atrial fibrillation    S/P ERCP    Hyperlipidemia    Right hip pain    Greater trochanteric bursitis of right hip    Primary localized osteoarthritis of right knee    Primary osteoarthritis of right knee     Past Medical History:   Diagnosis Date    Atrial fibrillation with RVR     FOLLOWS W/LINK, NO RECENT ISSUES, NO CP    Calculus of gallbladder with acute on chronic cholecystitis with obstruction 2022    Cancer     SKIN CANCER REMOVED    Colon polyp     Contusion of face     Diabetes mellitus     BS IN  THE A.M. 120 ON AVERAGE    Essential hypertension     CONT W/MEDS    Fall     Gallstones     Hyperlipidemia     Kidney stone     Osteoarthritis     Osteopenia     Renal insufficiency     due to nsaids (2017 states labs wnl) NO CURRENT PROBLEMS    Snoring     NO CPAP    Wound infection after surgery 2022     Past Surgical History:   Procedure Laterality Date    BLEPHAROPLASTY Bilateral     CARDIAC CATHETERIZATION Left     [facility], Dr. Cooper Ramires, NO INTERVENTION    CHOLECYSTECTOMY N/A 2022    Procedure: CHOLECYSTECTOMY LAPAROSCOPIC;  Surgeon: Josue Jernigan MD;  Location: [facility] MAIN OR;  Service: General;  Laterality: N/A;    COLONOSCOPY      negative    COLONOSCOPY      polyp-repeat     CYSTOSCOPY  2016    negative- Dr. Murphy per patient urethral polyp noted    ERCP N/A 2022    Procedure:  ENDOSCOPIC RETROGRADE CHOLANGIOPANCREATOGRAPHY WITH SPHINCTEROTOMY, BALLOON SWEEP, COMMON BILE DUCT STENT PLACED, PANCREATIC DUCT STENT PLACED;  Surgeon: Gurvinder Perry MD;  Location: [facility] ENDOSCOPY;  Service: Gastroenterology;  Laterality: N/A;  BILE DUCT STONES    ERCP N/A 5/10/2022    Procedure: ENDOSCOPIC RETROGRADE CHOLANGIOPANCREATOGRAPHY WITH STENT REMOVAL X2, SPHINCTEROTOMY, 12-15MM BALLOON SWEEP WITH BILE DUCT STONE REMOVAL;  Surgeon: Gurvinder Perry MD;  Location: [facility] ENDOSCOPY;  Service: Gastroenterology;  Laterality: N/A;  BILE DUCT STONES    SKIN CANCER EXCISION  04/2016    basal cell on back    TOTAL KNEE ARTHROPLASTY Right 1/17/2024    Procedure: RIGHT TOTAL KNEE ARTHROPLASTY WITH CLARISSA ROBOT;  Surgeon: Geraldo Gann MD;  Location: [facility] MAIN OR;  Service: Robotics - Ortho;  Laterality: Right;    TUBAL ABDOMINAL LIGATION      WRIST FRACTURE SURGERY Left     ORIF     PT Assessment (last 12 hours)       PT Evaluation and Treatment       Row Name 01/20/24 1249          Physical Therapy Time and Intention    Subjective Information complains of;pain  -RH     Document Type therapy note (daily note)  -     Mode of Treatment individual therapy;group therapy;physical therapy  -     Patient Effort good  -RH     Comment Gait training performed individually; therapeutic exercises performed in a group setting with 8 participants  -       Row Name 01/20/24 1249          General Information    Patient Observations alert;cooperative;agree to therapy  -       Row Name 01/20/24 1249          Pain Scale: FACES Pre/Post-Treatment    Pain: FACES Scale, Pretreatment 2-->hurts little bit  -     Posttreatment Pain Rating --  3/10  -RH     Pain Location - Side/Orientation Right  -     Pain Location - knee  -       Row Name 01/20/24 1249          Range of Motion (ROM)    Range of Motion --  Pt R knee AAROM at 92 degrees flex and 7 degeees ext.  -       Row Name 01/20/24 1248           Strength (Manual Muscle Testing)    Strength (Manual Muscle Testing) --  Pt R knee ext strength at 3-/5.  -       Row Name 01/20/24 1249          Mobility    Extremity Weight-bearing Status right lower extremity  -RH     Right Lower Extremity (Weight-bearing Status) weight-bearing as tolerated (WBAT)  -[facility] Name 01/20/24 1249          Transfers    Transfers sit-stand transfer;stand-sit transfer  -[facility] Name 01/20/24 1249          Sit-Stand Transfer    Sit-Stand Cecil (Transfers) contact guard  -     Assistive Device (Sit-Stand Transfers) walker, front-wheeled  -       Row Name 01/20/24 1249          Stand-Sit Transfer    Stand-Sit Cecil (Transfers) contact guard  -     Assistive Device (Stand-Sit Transfers) walker, front-wheeled  -[facility] Name 01/20/24 1249          Gait/Stairs (Locomotion)    Gait/Stairs Locomotion gait/ambulation assistive device  -RH     Cecil Level (Gait) contact guard  -     Assistive Device (Gait) walker, front-wheeled  -     Patient was able to Ambulate yes  -RH     Distance in Feet (Gait) 85  -RH     Pattern (Gait) 3-point;step-through  -RH     Deviations/Abnormal Patterns (Gait) antalgic;base of support, narrow;gait speed decreased;stride length decreased  -RH     Right Sided Gait Deviations heel strike decreased  -RH     Gait Assessment/Intervention Pt amb with RW and CGA with constant cues required for pt to allow her R knee to flex during swing phase of LLE and to also extend her knee during swing phase of the RLE.  -[facility] Name 01/20/24 1249          Safety Issues, Functional Mobility    Impairments Affecting Function (Mobility) balance;pain;range of motion (ROM);strength  -[facility] Name 01/20/24 1249          Balance    Balance Assessment standing dynamic balance  -     Dynamic Standing Balance contact guard  -     Position/Device Used, Standing Balance walker, front-wheeled  -[facility] Name 01/20/24 1240           Motor Skills    Therapeutic Exercise knee;hip;ankle  -       Row Name 01/20/24 1249          Hip (Therapeutic Exercise)    Hip (Therapeutic Exercise) isometric exercises  -     Hip Isometrics (Therapeutic Exercise) right;gluteal sets;10 repetitions;2 sets  -       Row Name 01/20/24 1249          Knee (Therapeutic Exercise)    Knee (Therapeutic Exercise) isometric exercises;strengthening exercise  -     Knee Isometrics (Therapeutic Exercise) right;quad sets;10 repetitions;2 sets  -     Knee Strengthening (Therapeutic Exercise) right;heel slides;SLR (straight leg raise);SAQ (short arc quad);LAQ (long arc quad);10 repetitions;2 sets  -       Row Name 01/20/24 1249          Ankle (Therapeutic Exercise)    Ankle (Therapeutic Exercise) AROM (active range of motion)  -     Ankle AROM (Therapeutic Exercise) right;dorsiflexion;plantarflexion;10 repetitions;2 sets  -       Row Name             Wound 01/17/24 1029 Right anterior knee Incision    Wound - Properties Group Placement Date: 01/17/24  -SC Placement Time: 1029  -SC Present on Original Admission: N  -SC Side: Right  -SC Orientation: anterior  -SC Location: knee  -SC Primary Wound Type: Incision  -SC    Retired Wound - Properties Group Placement Date: 01/17/24  -SC Placement Time: 1029  -SC Present on Original Admission: N  -SC Side: Right  -SC Orientation: anterior  -SC Location: knee  -SC Primary Wound Type: Incision  -SC    Retired Wound - Properties Group Date first assessed: 01/17/24  -SC Time first assessed: 1029  -SC Present on Original Admission: N  -SC Side: Right  -SC Location: knee  -SC Primary Wound Type: Incision  -SC      Row Name             Wound 01/17/24 1030 Right proximal leg Puncture    Wound - Properties Group Placement Date: 01/17/24  -SC Placement Time: 1030  -SC Present on Original Admission: N  -SC Side: Right  -SC Orientation: proximal  -SC Location: leg  -SC Primary Wound Type: Puncture  -SC    Retired Wound - Properties Group  Placement Date: 01/17/24  -SC Placement Time: 1030  -SC Present on Original Admission: N  -SC Side: Right  -SC Orientation: proximal  -SC Location: leg  -SC Primary Wound Type: Puncture  -SC    Retired Wound - Properties Group Date first assessed: 01/17/24  -SC Time first assessed: 1030  -SC Present on Original Admission: N  -SC Side: Right  -SC Location: leg  -SC Primary Wound Type: Puncture  -SC      Row Name 01/20/24 1249          Positioning and Restraints    In Chair call light within reach;reclined;encouraged to call for assist;exit alarm on  -RH       Row Name 01/20/24 1249          Progress Summary (PT)    Progress Toward Functional Goals (PT) progress toward functional goals is good  -     Daily Progress Summary (PT) Pt is progressing well with their exercise program.  Will continue current plan of care.  -               User Key  (r) = Recorded By, (t) = Taken By, (c) = Cosigned By      Initials Name Provider Type    SC Martha De Jesus, RN Registered Nurse    Martell Bordne PTA Physical Therapist Assistant                    Physical Therapy Education       Title: PT OT SLP Therapies (Resolved)       Topic: Physical Therapy (Resolved)       Point: Mobility training (Resolved)       Learning Progress Summary             Patient Acceptance, E, VU by TAMIA at 1/19/2024 1031    Acceptance, E, VU,NR by RILEY at 1/19/2024 0210    Acceptance, E, VU by RADHA at 1/18/2024 1047    Comment: WBAT  Need for staff assistance for all standing ADL/transfers  Safe transfer techniques  Safe positioning for ADLs  Adaptive techniques for lower body ADLs  ADL home safety/ adaptive equipment recommendations    Acceptance, E, VU,NR by RILEY at 1/18/2024 0352    Acceptance, E,TB, VU by GRAYSON at 1/17/2024 1358   Family Acceptance, E, VU by TAMIA at 1/19/2024 1031    Acceptance, E, VU,NR by RILEY at 1/18/2024 0352                         Point: Precautions (Resolved)       Learning Progress Summary             Patient Acceptance, E, VU by TAMIA  at 1/19/2024 1031    Acceptance, E, VU,NR by RK at 1/19/2024 0210    Acceptance, E, VU by RADHA at 1/18/2024 1047    Comment: WBAT  Need for staff assistance for all standing ADL/transfers  Safe transfer techniques  Safe positioning for ADLs  Adaptive techniques for lower body ADLs  ADL home safety/ adaptive equipment recommendations    Acceptance, E, VU,NR by RK at 1/18/2024 0352    Acceptance, E,TB, VU by GRAYSON at 1/17/2024 1358   Family Acceptance, E, VU by TAMIA at 1/19/2024 1031    Acceptance, E, VU,NR by RK at 1/18/2024 0352                                         User Key       Initials Effective Dates Name Provider Type Discipline     01/16/23 -  Mary Perdomo, RN Registered Nurse Nurse    TAMIA 09/21/21 -  Livier Dee RN Registered Nurse Nurse    RADHA 06/16/21 -  James Lincoln OT Occupational Therapist OT    GRAYSON 06/03/21 -  Addy Li, PT Physical Therapist PT                  PT Recommendation and Plan     Progress Summary (PT)  Progress Toward Functional Goals (PT): progress toward functional goals is good  Daily Progress Summary (PT): Pt is progressing well with their exercise program.  Will continue current plan of care.   Outcome Measures       Row Name 01/20/24 1200 01/19/24 1500 01/19/24 1300       How much help from another person do you currently need...    Turning from your back to your side while in flat bed without using bedrails? 3  -RH 3  -VK 3  -VK    Moving from lying on back to sitting on the side of a flat bed without bedrails? 3  -RH 3  -VK 3  -VK    Moving to and from a bed to a chair (including a wheelchair)? 3  -RH 3  -VK 3  -VK    Standing up from a chair using your arms (e.g., wheelchair, bedside chair)? 3  -RH 3  -VK 3  -VK    Climbing 3-5 steps with a railing? 3  -RH 3  -VK 3  -VK    To walk in hospital room? 4  -RH 3  -VK 3  -VK    AM-PAC 6 Clicks Score (PT) 19  -RH 18  -VK 18  -VK    Highest Level of Mobility Goal 6 --> Walk 10 steps or more  - 6 --> Walk 10 steps or more   -VK 6 --> Walk 10 steps or more  -VK      Row Name 01/18/24 1200 01/17/24 1358 01/17/24 1302       How much help from another person do you currently need...    Turning from your back to your side while in flat bed without using bedrails? 3  -RH 3  -GRAYSON --    Moving from lying on back to sitting on the side of a flat bed without bedrails? 3  -RH 3  -GRAYSON --    Moving to and from a bed to a chair (including a wheelchair)? 3  -RH 3  -GRAYSON --    Standing up from a chair using your arms (e.g., wheelchair, bedside chair)? 3  -RH 3  -GRAYSON --    Climbing 3-5 steps with a railing? 3  -RH 3  -GRAYSON --    To walk in hospital room? 4  -RH 3  -GRAYSON --    AM-PAC 6 Clicks Score (PT) 19  -RH 18  -GRAYSON --    Highest Level of Mobility Goal 6 --> Walk 10 steps or more  -RH 6 --> Walk 10 steps or more  -GRAYSON --       Functional Assessment    Outcome Measure Options -- -- AM-PAC 6 Clicks Basic Mobility (PT)  -GRAYSON              User Key  (r) = Recorded By, (t) = Taken By, (c) = Cosigned By      Initials Name Provider Type    RH Matrell Kang PTA Physical Therapist Assistant    Addy Arias, PT Physical Therapist    Velma Schaefer PTA Physical Therapist Assistant                     Time Calculation:    PT Charges       Row Name 01/20/24 1248             Time Calculation    PT Received On 01/20/24  -RH         Timed Charges    01770 - Gait Training Minutes  9  -RH      88425 - PT Therapeutic Activity Minutes 4  -RH         Untimed Charges    PT Group Therapy Minutes 40  -RH         Total Minutes    Timed Charges Total Minutes 13  -RH      Untimed Charges Total Minutes 40  -RH       Total Minutes 53  -RH                User Key  (r) = Recorded By, (t) = Taken By, (c) = Cosigned By      Initials Name Provider Type    Martell Borden PTA Physical Therapist Assistant                  Therapy Charges for Today       Code Description Service Date Service Provider Modifiers Qty    03287384419 HC GAIT TRAINING EA 15 MIN 1/20/2024 Martell Kang PTA GP  1    [id number]  PT THER PROC GROUP 1/20/2024 Martell Kang, PTA GP 1            PT G-Codes  Outcome Measure Options: AM-PAC 6 Clicks Daily Activity (OT), Optimal Instrument  AM-PAC 6 Clicks Score (PT): 19  AM-PAC 6 Clicks Score (OT): 20    Martell Kang PTA  1/20/2024

## 2024-01-20 NOTE — PLAN OF CARE
Goal Outcome Evaluation:  Plan of Care Reviewed With: patient        Progress: no change  Outcome Evaluation: pt. is a one person assist with walker and has ambulated 150 feet this shift.  voiding without difficulty. pt. has been medicated x two for pain with relief noted.  upon discharge pt. is going home with home health coming in.

## 2024-01-20 NOTE — PROGRESS NOTES
[facility]     Progress Note    Patient Name: Ely Coats  : 1945  MRN: [id number]  Primary Care Physician:  Julius Tijerina MD  Date of admission: 2024    Subjective   Subjective     HPI:  Patient Reports doing well this morning.  No new complaints.  Having better mobility of knee today than yesterday.    Review of Systems   See HPI    Objective   Objective     Vitals:   Temp:  [97.3 °F (36.3 °C)-98.4 °F (36.9 °C)] 97.3 °F (36.3 °C)  Heart Rate:  [] 79  Resp:  [16-18] 16  BP: ()/(58-84) 133/66  Physical Exam    General: Alert, no acute distress   Chest: Unlabored breathing, cardiovascular: Regular heart rate   Skill skeletal: Neurovascular intact extremity.  Positive pulses.  Dressing intact.  Negative Clint.    Result Review      Hemoglobin   Date Value Ref Range Status   2024 9.2 (L) 12.0 - 15.9 g/dL Final     Hematocrit   Date Value Ref Range Status   2024 28.9 (L) 34.0 - 46.6 % Final        Result Review:  I have personally reviewed the results from the time of this admission to 2024 09:25 EST and agree with these findings:  [x]  Laboratory  []  Microbiology  []  Radiology  []  EKG/Telemetry   []  Cardiology/Vascular   []  Pathology  []  Old records  []  Other:      Assessment & Plan   Assessment / Plan     Brief Patient Summary:  Ely Coats is a 78 y.o. female who is status post right total knee replacement    Active Hospital Problems:  Active Hospital Problems    Diagnosis     **Primary osteoarthritis of right knee     Primary localized osteoarthritis of right knee      Plan: Weightbearing as tolerated with walker  Physical and Occupational Therapy  Pain control  DVT prophylaxis  Discharge planning: Home with home health services       DVT prophylaxis:  Medical and mechanical DVT prophylaxis orders are present.    CODE STATUS:   Code Status (Patient has no pulse and is not breathing): CPR (Attempt to Resuscitate)  Medical Interventions  (Patient has pulse or is breathing): Full Support    Disposition:  I expect patient to be discharged later today with home health services.    Electronically signed by Geraldo Gann MD, 01/20/24, 9:25 AM EST.

## 2024-01-20 NOTE — PROGRESS NOTES
[facility]   Hospitalist Progress Note    Date of admission: 1/17/2024  Patient Name: Ely Coats  1945  Date: 1/20/2024      Subjective     Follow up from surgery/medical management of co-morbidities     Interval Followup: rom immproving some today. Pain reasonable.  Discharging home today with     Objective     Vitals:   Temp:  [97.3 °F (36.3 °C)-98.4 °F (36.9 °C)] 97.3 °F (36.3 °C)  Heart Rate:  [] 79  Resp:  [16-18] 16  BP: ()/(58-84) 133/66    Physical Exam  Awake, conversant, NAD in chair  Breathing comfortably on room air no wheezing  rrr  Stable appearing postoperative wound    Result Review:  Vital signs, labs and recent relevant imaging reviewed.      acetaminophen, 1,000 mg, Oral, Q8H  apixaban, 5 mg, Oral, Q12H  carvedilol, 3.125 mg, Oral, BID With Meals  dilTIAZem CD, 240 mg, Oral, Daily  ferrous sulfate, 325 mg, Oral, Daily With Breakfast  [Held by provider] furosemide, 20 mg, Oral, Daily  insulin lispro, 2-7 Units, Subcutaneous, 4x Daily AC & at Bedtime  [Held by provider] losartan, 25 mg, Oral, Daily  pantoprazole, 40 mg, Oral, QAM  polyethylene glycol, 17 g, Oral, BID  senna-docusate sodium, 1 tablet, Oral, BID          dextrose    dextrose    glucagon (human recombinant)    lactated ringers    magnesium hydroxide    Morphine **AND** naloxone    ondansetron ODT **OR** ondansetron    oxyCODONE    oxyCODONE      Assessment / Plan     Assessment/Plan:  Severe osteoarthritis s/p right total knee surgery on 1/17/2024  Postoperative Hypoxia and atelectasis  Hypotension with hx of HTN  DM2, on metformin outpt, A1c 6.5  HTN  Afib, on apixaban outpt  Borderline reduced GFR outpatient   GERD  Iron deficiency anemia    Continuing apixaban for discharge, coreg, diltiazem  Instructed on holding losartan, check bp at home, resumption parameters discussed, otherwise will defer resuming and f/u with pcp to discuss, likely wjith resolving   Cont on iron, f/u with PCP to discuss  screening as appropriate, no bleeding noted here, hb stabilizing near 9  Cont resp hygiene  SSI for diabetes, monitor glucose    HH established, d/w SW   Monitor for sedation while on opiates   Bowel regimen prescribed  pt/ot  continue postoperative wound care   Home today as per ortho with close f/u     D/w sw, rn, c/s recs reviewed     DVT prophylaxis:  Medical and mechanical DVT prophylaxis orders are present.    Code Status (Patient has no pulse and is not breathing): CPR (Attempt to Resuscitate)  Medical Interventions (Patient has pulse or is breathing): Full Support        CBC          1/9/2024    09:05 1/18/2024    05:24 1/19/2024    04:19   CBC   WBC 5.76      RBC 3.84      Hemoglobin 12.3  9.6  9.2    Hematocrit 38.0  30.0  28.9    MCV 99.0      MCH 32.0      MCHC 32.4      RDW 13.8      Platelets 268          CMP          12/21/2023    09:54 1/9/2024    09:05 1/18/2024    05:24   CMP   Glucose 93  108  138    BUN 16  18  15    Creatinine 0.81  0.96  0.78    EGFR 73.5  59.9  76.9    Sodium 145  141  137    Potassium 4.4  4.4  4.0    Chloride 107  105  105    Calcium 9.1  9.2  8.6    Total Protein 6.3  6.5     Albumin 4.0  4.0     Globulin 2.3  2.5     Total Bilirubin 0.3  0.2     Alkaline Phosphatase 60  62     AST (SGOT) 20  14     ALT (SGPT) 17  11     Albumin/Globulin Ratio 1.7  1.6     BUN/Creatinine Ratio 19.8  18.8  19.2    Anion Gap 12.6  11.3  9.4

## 2024-01-24 ENCOUNTER — OFFICE VISIT (OUTPATIENT)
Dept: INTERNAL MEDICINE | Facility: CLINIC | Age: 79
End: 2024-01-24
Payer: MEDICARE

## 2024-01-24 VITALS
TEMPERATURE: 96.9 F | BODY MASS INDEX: 28.68 KG/M2 | SYSTOLIC BLOOD PRESSURE: 117 MMHG | DIASTOLIC BLOOD PRESSURE: 71 MMHG | WEIGHT: 168 LBS | HEIGHT: 64 IN | HEART RATE: 65 BPM | OXYGEN SATURATION: 97 %

## 2024-01-24 DIAGNOSIS — Z09 HOSPITAL DISCHARGE FOLLOW-UP: Primary | ICD-10-CM

## 2024-01-24 DIAGNOSIS — Z96.651 S/P TOTAL KNEE ARTHROPLASTY, RIGHT: ICD-10-CM

## 2024-01-24 DIAGNOSIS — R11.0 NAUSEA: ICD-10-CM

## 2024-01-24 RX ORDER — ONDANSETRON 4 MG/1
4 TABLET, ORALLY DISINTEGRATING ORAL EVERY 8 HOURS PRN
Qty: 20 TABLET | Refills: 0 | Status: SHIPPED | OUTPATIENT
Start: 2024-01-24

## 2024-01-24 NOTE — PROGRESS NOTES
"Chief Complaint  Hospital Follow Up Visit    Subjective      Ely Coats presents to [facility]       History of Present Illness    Current Outpatient Medications   Medication Instructions    Accu-Chek Guide test strip USE 1 STRIP TWICE DAILY    apixaban (ELIQUIS) 5 mg, Oral, 2 Times Daily, INSTRUCTED TO HOLD  2 DAYS PREOP BY DR. LINK. PT NOTIFIED BY OK (BEAVERS). LAST  DOSE  01/14/24 HS.    carvedilol (COREG) 6.25 MG tablet 1 tablet, Oral, 2 Times Daily With Meals    cholecalciferol (VITAMIN D3) 1,000 Units, Daily    cyanocobalamin (VITAMIN B-12) 500 mcg, Oral, Daily    dilTIAZem CD (CARDIZEM CD) 240 mg, Oral, Daily    ferrous sulfate (FERROUSUL) 325 mg, Oral, Daily With Breakfast    furosemide (LASIX) 20 mg, Oral, Daily    HYDROcodone-acetaminophen (Norco) 7.5-325 MG per tablet 1-2 tablets, Oral, Every 4 Hours PRN    MAGNESIUM PO 1 tablet, Oral, Daily    metFORMIN (GLUCOPHAGE) 500 mg, Oral, Daily With Breakfast    Multiple Vitamin (MULTIVITAMIN) tablet 1 tablet, Oral, Daily    naloxone (NARCAN) 4 MG/0.1ML nasal spray Call 911. Don't prime. [place] in 1 nostril for overdose. Repeat in 2-3 minutes in other nostril if no or minimal breathing/responsiveness.    pantoprazole (PROTONIX) 40 MG EC tablet Take 1 tablet by mouth Daily.    polyethylene glycol (MIRALAX) 17 g, Oral, 2 Times Daily PRN       The following portions of the patient's history were reviewed and updated as appropriate: allergies, current medications, past family history, past medical history, past social history, past surgical history, and problem list.    Objective   Vital Signs:   /71 (BP Location: Left arm, Patient Position: Sitting, Cuff Size: Adult)   Pulse 65   Temp 96.9 °F (36.1 °C) (Temporal)   Ht 162.6 cm (64\")   Wt 76.2 kg (168 lb)   SpO2 97%   BMI 28.84 kg/m²     Wt Readings from Last 3 Encounters:   01/24/24 76.2 kg (168 lb)   01/17/24 73.5 kg (162 lb 0.6 oz) "   01/09/24 75 kg (165 lb 5.5 oz)     BP Readings from Last 3 Encounters:   01/24/24 117/71   01/20/24 133/68   12/21/23 136/82     Physical Exam  Musculoskeletal:      Comments:           Result Review :  The following data was reviewed by: CASTRO Shepherd on 01/24/2024:  {[facility]:83115}    Common labs          1/9/2024    09:05 1/18/2024    05:24 1/19/2024    04:19   Common Labs   Glucose 108  138     BUN 18  15     Creatinine 0.96  0.78     Sodium 141  137     Potassium 4.4  4.0     Chloride 105  105     Calcium 9.2  8.6     Albumin 4.0      Total Bilirubin 0.2      Alkaline Phosphatase 62      AST (SGOT) 14      ALT (SGPT) 11      WBC 5.76      Hemoglobin 12.3  9.6  9.2    Hematocrit 38.0  30.0  28.9    Platelets 268      Hemoglobin A1C 6.50          Lab Results (last 72 hours)       ** No results found for the last 72 hours. **             XR Knee 1 or 2 View Right    Result Date: 1/17/2024   Status post total knee arthroplasty in near anatomic alignment, with no evidence of immediate complication.      JUDD ACUNA MD       Electronically Signed and Approved By: JUDD ACUNA MD on 1/17/2024 at 12:18               Lab Results   Component Value Date    COVID19 NOT DETECTED 07/15/2020    INR 1.10 01/09/2024    BILIRUBINUR 1 mg/dL (A) 10/24/2023    POCGLU 112 (H) 01/20/2024       Procedures        Assessment and Plan   There are no diagnoses linked to this encounter.              There are no discontinued medications.     {Time Spent (Optional):02149}  Follow Up   No follow-ups on file.  Patient was given instructions and counseling regarding her condition or for health maintenance advice. Please see specific information pulled into the AVS if appropriate.       CASTRO Shepherd  01/24/24  11:30 EST

## 2024-01-24 NOTE — PROGRESS NOTES
Transitional Care Follow Up Visit  Subjective     Ely Coats is a 78 y.o. female who presents for a transitional care management visit.    Within 48 business hours after discharge our office contacted her via telephone to coordinate her care and needs.      I reviewed and discussed the details of that call along with the discharge summary, hospital problems, inpatient lab results, inpatient diagnostic studies, and consultation reports with Ely.     Current outpatient and discharge medications have been reconciled for the patient.  Reviewed by: CASTRO Shepherd    Admitted on 1/17/24 and discharged on 1/20/24         No data to display              Risk for Readmission (LACE) Score: 3 (1/20/2024  6:00 AM)      History of Present Illness   Course During Hospital Stay:   78 y.o. female who presents for elective joint surgery. Patient has tried non-operative/conservative measures but symptoms have progressed and patient ultimately opted for surgical intervention.  Apart from joint pain they have been in their usual state of health recently without acute changes in their other medications.  No current fevers, chills, cough, nausea or vomiting.  Patient had some mild hypoxia postoperatively, placed on oxygen, doesn't use any at baseline, denies any acute soa currently.    Pt has held her apixaban preoperatively for afib as directed.        Pt states she is doing well today. She is here with her son. She states pain is under control with medication. She has been having some nausea due to the pain pills though. She is eating a little prior to taking it. She denies any fevers or other concerns. Using her walker well. Home health has already started coming out and changed her dressing.     The following portions of the patient's history were reviewed and updated as appropriate: allergies, current medications, past family history, past medical history, past social history, past surgical history, and  problem list.    Review of Systems    Objective   Physical Exam  Vitals and nursing note reviewed.   Constitutional:       Appearance: Normal appearance. She is not ill-appearing.   HENT:      Head: Normocephalic and atraumatic.   Eyes:      Conjunctiva/sclera: Conjunctivae normal.      Pupils: Pupils are equal, round, and reactive to light.   Pulmonary:      Effort: Pulmonary effort is normal.   Musculoskeletal:        Legs:       Comments: Post op incision of right knee with staples in place. Well approximated, no signs of infection.      Neurological:      Mental Status: She is alert.   Psychiatric:         Mood and Affect: Mood normal.         Behavior: Behavior normal.       Admission (Discharged) with Mg Mcdaniel MD (01/17/2024)    XR Knee 1 or 2 View Right (01/17/2024 12:14)    Assessment & Plan   Diagnoses and all orders for this visit:    1. Hospital discharge follow-up (Primary)    2. S/P total knee arthroplasty, right  Comments:  improving    3. Nausea  -     ondansetron ODT (ZOFRAN-ODT) 4 MG disintegrating tablet; Place 1 tablet on the tongue Every 8 (Eight) Hours As Needed for Nausea or Vomiting.  Dispense: 20 tablet; Refill: 0        Advised to keep appt with ortho for follow up next week.

## 2024-01-30 ENCOUNTER — OFFICE VISIT (OUTPATIENT)
Dept: ORTHOPEDIC SURGERY | Facility: CLINIC | Age: 79
End: 2024-01-30
Payer: MEDICARE

## 2024-01-30 VITALS
BODY MASS INDEX: 28.68 KG/M2 | HEIGHT: 64 IN | SYSTOLIC BLOOD PRESSURE: 150 MMHG | WEIGHT: 167.99 LBS | HEART RATE: 70 BPM | DIASTOLIC BLOOD PRESSURE: 70 MMHG | OXYGEN SATURATION: 93 %

## 2024-01-30 DIAGNOSIS — Z47.89 AFTERCARE FOLLOWING SURGERY OF THE MUSCULOSKELETAL SYSTEM: Primary | ICD-10-CM

## 2024-01-30 DIAGNOSIS — M17.11 PRIMARY LOCALIZED OSTEOARTHRITIS OF RIGHT KNEE: ICD-10-CM

## 2024-01-30 DIAGNOSIS — M25.561 RIGHT KNEE PAIN, UNSPECIFIED CHRONICITY: ICD-10-CM

## 2024-01-30 PROCEDURE — 1159F MED LIST DOCD IN RCRD: CPT | Performed by: PHYSICIAN ASSISTANT

## 2024-01-30 PROCEDURE — 3078F DIAST BP <80 MM HG: CPT | Performed by: PHYSICIAN ASSISTANT

## 2024-01-30 PROCEDURE — 99024 POSTOP FOLLOW-UP VISIT: CPT | Performed by: PHYSICIAN ASSISTANT

## 2024-01-30 PROCEDURE — 1160F RVW MEDS BY RX/DR IN RCRD: CPT | Performed by: PHYSICIAN ASSISTANT

## 2024-01-30 PROCEDURE — 3077F SYST BP >= 140 MM HG: CPT | Performed by: PHYSICIAN ASSISTANT

## 2024-01-30 RX ORDER — HYDROCODONE BITARTRATE AND ACETAMINOPHEN 7.5; 325 MG/1; MG/1
1 TABLET ORAL EVERY 4 HOURS PRN
Qty: 40 TABLET | Refills: 0 | Status: SHIPPED | OUTPATIENT
Start: 2024-01-30

## 2024-01-30 NOTE — PROGRESS NOTES
Chief Complaint  Pain and Follow-up of the Right Knee and Suture / Staple Removal    Subjective          History of Present Illness      Ely Coats is a 78 y.o. female  presents to [facility] for     Patient presents with her daughter Flavia for 2-week postoperative valuation right total knee arthroplasty, 1/17/2024.  Patient states she was only visited by her home health therapist 3 times [facility] did not call her back and stopped showing up at her house.  She states she would like to give up on them as they gave up on her and she would like to start physical therapy at [facility] on [address].  She is taking pain medication and is requesting a refill.  She states she has been doing home exercises even though therapy did not attend to her.  She states that she has been trying to work with extension and flexion of her knee.  They state the incision has been healing well and denies drainage.  She takes DVT prophylaxis, denies calf pain.  She is ambulating with a walker.      Allergies   Allergen Reactions    Penicillins Rash     Mild rash- pt states can take amoxicillin or keflex      Statins Other (See Comments)     Hair loss crestor        Social History     Socioeconomic History    Marital status:    Tobacco Use    Smoking status: Never    Smokeless tobacco: Never   Vaping Use    Vaping Use: Never used   Substance and Sexual Activity    Alcohol use: Never    Drug use: Never    Sexual activity: Defer        REVIEW OF SYSTEMS    Constitutional: Awake alert and oriented x3, no acute distress, denies fevers, chills, weight loss  Respiratory: No respiratory distress  Vascular: Brisk cap refill, Intact distal pulses, No cyanosis, compartments soft with no signs or symptoms of compartment syndrome or DVT.   Cardiovascular: Denies chest pain, shortness of breath  Skin: Denies rashes, acute skin changes  Neurologic: Denies headache, loss of consciousness  MSK: Right knee  "pain      Objective   Vital Signs:   /70   Pulse 70   Ht 162.6 cm (64\")   Wt 76.2 kg (167 lb 15.9 oz)   SpO2 93%   BMI 28.84 kg/m²     Body mass index is 28.84 kg/m².    Physical Exam       Right knee: Incisions are healing well, no erythema, no drainage or dehiscence, no signs of infection.  Mild generalized swelling to the knee, extension -5 flexion 95, stable to varus/valgus stress stable anterior/posterior drawer, nontender calf, negative Clint testing      Procedures    Imaging Results (Most Recent)       Procedure Component Value Units Date/Time    XR Knee 3 View Right [[id number]] Resulted: 01/30/24 1433     Updated: 01/30/24 1434    Narrative:      View:AP/Lateral and [place] view(s)  Site: Right knee  Indication: Right knee pain  Study: X-rays ordered, taken in the office, and reviewed today  X-ray: Intact appearing right total knee arthroplasty, no signs of   hardware failure or loosening, no subsidence or periprosthetic fracture,   good alignment  Comparative data: Postoperative studies             Result Review :   The following data was reviewed by: BROOKE Bryant on 01/30/2024:  Data reviewed : Radiologic studies reviewed by me with the patient and her family              Assessment and Plan    Diagnoses and all orders for this visit:    1. Aftercare following surgery of right total knee arthroplasty 1/17/2024 (Primary)  -     Ambulatory Referral to Physical Therapy Evaluate and treat (2-3X/WEEK FOR 6-8 WEEKS)    2. Right knee pain, unspecified chronicity  -     XR Knee 3 View Right  -     Ambulatory Referral to Physical Therapy Evaluate and treat (2-3X/WEEK FOR 6-8 WEEKS)        Reviewed x-rays with the patient and her family discussed diagnosis and treatment options with them.  Patient was given a pain medication refill, take as needed.  She was given order to start outpatient therapy.  Continue and finish DVT prophylaxis continue home exercises, continue weightbearing and " activity as tolerated.  Sutures/staples removed in office today. Steri-strips applied. Discussed incision care/hygiene. May shower, but do not submerge in water until fully healed.  Advised patient that if any concerning symptoms regarding incision appearance occur that they should call us right away, patient expressed understanding.  Follow-up in 4 weeks    Call or return if worsening symptoms.    Follow Up   Return in about 4 weeks (around 2/27/2024) for Recheck.  Patient was given instructions and counseling regarding her condition or for health maintenance advice. Please see specific information pulled into the AVS if appropriate.       EMR Dragon/Transcription disclaimer:  Much of this encounter note is an electronic transcription/translation of spoken language to printed text, aka voice recognition.  The electronic translation of spoken language may permit erroneous or at times nonsensical words or phrases to be inadvertently transcribed; although I have reviewed the note for such errors, some may still exist so please interpret based on surrounding text content.

## 2024-01-31 ENCOUNTER — TELEPHONE (OUTPATIENT)
Dept: ORTHOPEDIC SURGERY | Facility: CLINIC | Age: 79
End: 2024-01-31
Payer: MEDICARE

## 2024-01-31 NOTE — TELEPHONE ENCOUNTER
I RECEIVED A MESSAGE FROM STEPHON ABOUT ISSUES WITH HOME HEALTH.  I CALLED [facility] AND TALKED TO PRINCESS AND SHE STATES THAT PATIENT'S INSURANCE LIMITS NUMBER OF VISITS.  PATIENT IS NOW GOING TO DO OUT PT AND WANTS TO GO TO PTA IN [facility].  AMADO, OFFICE REFERRAL CLERK WAS INFORMED AND ASKED TO SEND ORDERS AND HAVE PTA IN [facility] TO CALL PATIENT WITH FIRST APPOINTMENT THE FIRST OF THE WEEK (2-4-24).  [facility] WILL SEE PATIENT THIS WEEK AND DISCHARGE.  PATIENT WAS INFORMED AND VOICED UNDERSTANDING.

## 2024-02-14 ENCOUNTER — TELEPHONE (OUTPATIENT)
Dept: INTERNAL MEDICINE | Facility: CLINIC | Age: 79
End: 2024-02-14
Payer: MEDICARE

## 2024-02-27 ENCOUNTER — OFFICE VISIT (OUTPATIENT)
Dept: ORTHOPEDIC SURGERY | Facility: CLINIC | Age: 79
End: 2024-02-27
Payer: MEDICARE

## 2024-02-27 VITALS
HEIGHT: 64 IN | HEART RATE: 71 BPM | DIASTOLIC BLOOD PRESSURE: 69 MMHG | SYSTOLIC BLOOD PRESSURE: 104 MMHG | OXYGEN SATURATION: 96 % | BODY MASS INDEX: 28.68 KG/M2 | WEIGHT: 167.99 LBS

## 2024-02-27 DIAGNOSIS — R11.0 NAUSEA: ICD-10-CM

## 2024-02-27 DIAGNOSIS — Z47.89 AFTERCARE FOLLOWING SURGERY OF THE MUSCULOSKELETAL SYSTEM: Primary | ICD-10-CM

## 2024-02-27 PROCEDURE — 1159F MED LIST DOCD IN RCRD: CPT | Performed by: PHYSICIAN ASSISTANT

## 2024-02-27 PROCEDURE — 99024 POSTOP FOLLOW-UP VISIT: CPT | Performed by: PHYSICIAN ASSISTANT

## 2024-02-27 PROCEDURE — 1160F RVW MEDS BY RX/DR IN RCRD: CPT | Performed by: PHYSICIAN ASSISTANT

## 2024-02-27 PROCEDURE — 3074F SYST BP LT 130 MM HG: CPT | Performed by: PHYSICIAN ASSISTANT

## 2024-02-27 PROCEDURE — 3078F DIAST BP <80 MM HG: CPT | Performed by: PHYSICIAN ASSISTANT

## 2024-02-27 RX ORDER — DILTIAZEM HYDROCHLORIDE 240 MG/1
1 CAPSULE, EXTENDED RELEASE ORAL DAILY
COMMUNITY
Start: 2024-01-30

## 2024-02-27 RX ORDER — ONDANSETRON 4 MG/1
4 TABLET, ORALLY DISINTEGRATING ORAL EVERY 8 HOURS PRN
Qty: 20 TABLET | Refills: 0 | Status: SHIPPED | OUTPATIENT
Start: 2024-02-27

## 2024-02-27 NOTE — PROGRESS NOTES
"Chief Complaint  Pain and Follow-up of the Right Knee    Subjective          History of Present Illness      Ely Coats is a 79 y.o. female  presents to [facility] for     Patient presents with her daughter for 6-week postop evaluation of right total knee arthroplasty of 1/17/2024.  Patient has a therapy note stating she has 120 degrees of flexion and extension -5.  She admits to pain at night she is mainly taking the pain medication before therapy.  She is on her regular dose of Eliquis she denies calf pain.  She presents without a cane or a walker.  Patient seems frustrated with her progress, she is advised she is doing well based on her presentation.  She states the incision has been healing well, denies drainage redness or dehiscence      Allergies   Allergen Reactions    Penicillins Rash     Mild rash- pt states can take amoxicillin or keflex      Statins Other (See Comments)     Hair loss crestor        Social History     Socioeconomic History    Marital status:    Tobacco Use    Smoking status: Never    Smokeless tobacco: Never   Vaping Use    Vaping Use: Never used   Substance and Sexual Activity    Alcohol use: Never    Drug use: Never    Sexual activity: Defer        REVIEW OF SYSTEMS    Constitutional: Awake alert and oriented x3, no acute distress, denies fevers, chills, weight loss  Respiratory: No respiratory distress  Vascular: Brisk cap refill, Intact distal pulses, No cyanosis, compartments soft with no signs or symptoms of compartment syndrome or DVT.   Cardiovascular: Denies chest pain, shortness of breath  Skin: Denies rashes, acute skin changes  Neurologic: Denies headache, loss of consciousness  MSK: Right knee pain      Objective   Vital Signs:   /69   Pulse 71   Ht 162.6 cm (64\")   Wt 76.2 kg (167 lb 15.9 oz)   SpO2 96%   BMI 28.84 kg/m²     Body mass index is 28.84 kg/m².    Physical Exam       Right knee: Mild generalized swelling, " incision is well-healed, no erythema, no drainage or dehiscence, no streaking or signs of infection, extension -5 flexion 120, stable to varus/valgus stress stable anterior/posterior strength appropriate, patient ambulates with nonantalgic gait, nontender calf, negative Clint testing      Procedures    Imaging Results (Most Recent)       None             Result Review :   The following data was reviewed by: BROOKE Bryant on 02/27/2024:               Assessment and Plan    Diagnoses and all orders for this visit:    1. Aftercare following surgery of right total knee arthroplasty 1/17/2024 (Primary)  -     Ambulatory Referral to Physical Therapy Evaluate and treat (2-3x/week for 6-8 weeks)    2. Nausea  -     Ambulatory Referral to Physical Therapy Evaluate and treat (2-3x/week for 6-8 weeks)  -     ondansetron ODT (ZOFRAN-ODT) 4 MG disintegrating tablet; Place 1 tablet on the tongue Every 8 (Eight) Hours As Needed for Nausea or Vomiting.  Dispense: 20 tablet; Refill: 0        Discussed diagnosis and treatment options with the patient she was advised to continue therapy for strength and range of motion, continue pain meds as needed she was given a refill of Zofran due to the nausea caused by the pain meds.  Continue weightbearing as tolerated follow-up in 6 weeks for recheck with x-rays    Call or return if worsening symptoms.    Follow Up   Return in about 6 weeks (around 4/9/2024).  Patient was given instructions and counseling regarding her condition or for health maintenance advice. Please see specific information pulled into the AVS if appropriate.       EMR Dragon/Transcription disclaimer:  Much of this encounter note is an electronic transcription/translation of spoken language to printed text, aka voice recognition.  The electronic translation of spoken language may permit erroneous or at times nonsensical words or phrases to be inadvertently transcribed; although I have reviewed the note for  such errors, some may still exist so please interpret based on surrounding text content.

## 2024-03-06 RX ORDER — LOSARTAN POTASSIUM 25 MG/1
TABLET ORAL
Qty: 90 TABLET | Refills: 0 | OUTPATIENT
Start: 2024-03-06

## 2024-03-06 NOTE — TELEPHONE ENCOUNTER
Received a refill for:  Requested Prescriptions     Pending Prescriptions Disp Refills    losartan (COZAAR) 25 MG tablet [Pharmacy Med Name: Losartan Potassium 25 MG Oral Tablet] 90 tablet 0     Sig: Take 1 tablet by mouth once daily     Per notification pt is no longer taking.  I called pt to confirm.  She reports that she has not taken the med for a while and it was possibly d/c'd in the hospital.

## 2024-03-12 ENCOUNTER — TELEPHONE (OUTPATIENT)
Dept: ORTHOPEDIC SURGERY | Facility: CLINIC | Age: 79
End: 2024-03-12
Payer: MEDICARE

## 2024-03-12 DIAGNOSIS — Z47.89 AFTERCARE FOLLOWING SURGERY OF THE MUSCULOSKELETAL SYSTEM: Primary | ICD-10-CM

## 2024-03-12 RX ORDER — HYDROCODONE BITARTRATE AND ACETAMINOPHEN 7.5; 325 MG/1; MG/1
1 TABLET ORAL EVERY 6 HOURS PRN
Qty: 28 TABLET | Refills: 0 | Status: SHIPPED | OUTPATIENT
Start: 2024-03-12

## 2024-03-21 ENCOUNTER — OFFICE VISIT (OUTPATIENT)
Dept: INTERNAL MEDICINE | Facility: CLINIC | Age: 79
End: 2024-03-21
Payer: MEDICARE

## 2024-03-21 VITALS
SYSTOLIC BLOOD PRESSURE: 148 MMHG | BODY MASS INDEX: 26.15 KG/M2 | HEART RATE: 70 BPM | DIASTOLIC BLOOD PRESSURE: 80 MMHG | HEIGHT: 64 IN | WEIGHT: 153.2 LBS | TEMPERATURE: 98 F | OXYGEN SATURATION: 98 %

## 2024-03-21 DIAGNOSIS — I10 ESSENTIAL HYPERTENSION: ICD-10-CM

## 2024-03-21 DIAGNOSIS — Z12.11 SCREEN FOR COLON CANCER: Primary | ICD-10-CM

## 2024-03-21 DIAGNOSIS — Z96.651 S/P TOTAL KNEE ARTHROPLASTY, RIGHT: ICD-10-CM

## 2024-03-21 DIAGNOSIS — E11.65 TYPE 2 DIABETES MELLITUS WITH HYPERGLYCEMIA, UNSPECIFIED WHETHER LONG TERM INSULIN USE: ICD-10-CM

## 2024-03-21 DIAGNOSIS — Z00.00 ROUTINE CHECK-UP: Primary | ICD-10-CM

## 2024-03-21 DIAGNOSIS — I48.0 PAROXYSMAL ATRIAL FIBRILLATION: ICD-10-CM

## 2024-03-21 LAB
ALBUMIN SERPL-MCNC: 4.2 G/DL (ref 3.5–5.2)
ALBUMIN/GLOB SERPL: 2.1 G/DL
ALP SERPL-CCNC: 56 U/L (ref 39–117)
ALT SERPL W P-5'-P-CCNC: 10 U/L (ref 1–33)
ANION GAP SERPL CALCULATED.3IONS-SCNC: 12.1 MMOL/L (ref 5–15)
AST SERPL-CCNC: 14 U/L (ref 1–32)
BILIRUB SERPL-MCNC: 0.4 MG/DL (ref 0–1.2)
BUN SERPL-MCNC: 15 MG/DL (ref 8–23)
BUN/CREAT SERPL: 18.3 (ref 7–25)
CALCIUM SPEC-SCNC: 9.1 MG/DL (ref 8.6–10.5)
CHLORIDE SERPL-SCNC: 106 MMOL/L (ref 98–107)
CO2 SERPL-SCNC: 23.9 MMOL/L (ref 22–29)
CREAT SERPL-MCNC: 0.82 MG/DL (ref 0.57–1)
EGFRCR SERPLBLD CKD-EPI 2021: 72 ML/MIN/1.73
GLOBULIN UR ELPH-MCNC: 2 GM/DL
GLUCOSE SERPL-MCNC: 94 MG/DL (ref 65–99)
HBA1C MFR BLD: 6.1 % (ref 4.8–5.6)
POTASSIUM SERPL-SCNC: 3.8 MMOL/L (ref 3.5–5.2)
PROT SERPL-MCNC: 6.2 G/DL (ref 6–8.5)
SODIUM SERPL-SCNC: 142 MMOL/L (ref 136–145)

## 2024-03-21 PROCEDURE — 80053 COMPREHEN METABOLIC PANEL: CPT | Performed by: STUDENT IN AN ORGANIZED HEALTH CARE EDUCATION/TRAINING PROGRAM

## 2024-03-21 PROCEDURE — 83036 HEMOGLOBIN GLYCOSYLATED A1C: CPT | Performed by: STUDENT IN AN ORGANIZED HEALTH CARE EDUCATION/TRAINING PROGRAM

## 2024-03-21 RX ORDER — FERROUS SULFATE 325(65) MG
325 TABLET ORAL
Qty: 90 TABLET | Refills: 0 | Status: SHIPPED | OUTPATIENT
Start: 2024-03-21

## 2024-03-21 RX ORDER — LISINOPRIL 10 MG/1
10 TABLET ORAL DAILY
Qty: 90 TABLET | Refills: 2 | Status: SHIPPED | OUTPATIENT
Start: 2024-03-21

## 2024-03-21 NOTE — PROGRESS NOTES
Chief Complaint  Diabetes and Hypertension    Subjective          Ely Lizzette Coats presents to [facility]  History of Present Illness     recently hospitalized, and currently in rehab.    Mrs Lizzette Coats does check her BP at home, and reports systolics have been running 150s to 160s.    No interim falls.  Bruise noted on left lower leg.  Reports that a pint of ice cream fell out of freezer and hit her leg recently.    PHQ-9 Depression Screening  Little interest or pleasure in doing things?     Feeling down, depressed, or hopeless?     Trouble falling or staying asleep, or sleeping too much?     Feeling tired or having little energy?     Poor appetite or overeating?     Feeling bad about yourself - or that you are a failure or have let yourself or your family down?     Trouble concentrating on things, such as reading the newspaper or watching television?     Moving or speaking so slowly that other people could have noticed? Or the opposite - being so fidgety or restless that you have been moving around a lot more than usual?     Thoughts that you would be better off dead, or of hurting yourself in some way?     PHQ-9 Total Score     If you checked off any problems, how difficult have these problems made it for you to do your work, take care of things at home, or get along with other people?           Current Outpatient Medications   Medication Instructions    Accu-Chek Guide test strip USE 1 STRIP TWICE DAILY    apixaban (ELIQUIS) 5 mg, Oral, 2 Times Daily    carvedilol (COREG) 6.25 MG tablet 1 tablet, Oral, 2 Times Daily With Meals    cyanocobalamin (VITAMIN B-12) 500 mcg, Oral, Daily    Dilt- MG 24 hr capsule 1 capsule, Daily    dilTIAZem CD (CARDIZEM CD) 240 mg, Oral, Daily    ferrous sulfate (FERROUSUL) 325 mg, Oral, Daily With Breakfast    furosemide (LASIX) 20 mg, Oral, Daily    HYDROcodone-acetaminophen (Norco) 7.5-325 MG per tablet 1  "tablet, Oral, Every 4 Hours PRN    HYDROcodone-acetaminophen (Norco) 7.5-325 MG per tablet 1 tablet, Oral, Every 6 Hours PRN    lisinopril (PRINIVIL,ZESTRIL) 10 mg, Oral, Daily    MAGNESIUM PO 1 tablet, Oral, Daily    metFORMIN (GLUCOPHAGE) 500 mg, Oral, Daily With Breakfast    Multiple Vitamin (MULTIVITAMIN) tablet 1 tablet, Oral, Daily    ondansetron ODT (ZOFRAN-ODT) 4 mg, Translingual, Every 8 Hours PRN    pantoprazole (PROTONIX) 40 MG EC tablet Take 1 tablet by mouth Daily.    polyethylene glycol (MIRALAX) 17 g, Oral, 2 Times Daily PRN       The following portions of the patient's history were reviewed and updated as appropriate: allergies, current medications, past family history, past medical history, past social history, past surgical history, and problem list.    Objective   Vital Signs:   /80   Pulse 70   Temp 98 °F (36.7 °C) (Temporal)   Ht 162.6 cm (64\")   Wt 69.5 kg (153 lb 3.2 oz)   SpO2 98%   BMI 26.30 kg/m²     BP Readings from Last 3 Encounters:   03/21/24 148/80   02/27/24 104/69   01/30/24 150/70     Wt Readings from Last 3 Encounters:   03/21/24 69.5 kg (153 lb 3.2 oz)   02/27/24 76.2 kg (167 lb 15.9 oz)   01/30/24 76.2 kg (167 lb 15.9 oz)           Physical Exam  Vitals reviewed.   Constitutional:       General: She is not in acute distress.     Appearance: Normal appearance. She is not ill-appearing, toxic-appearing or diaphoretic.   HENT:      Head: Normocephalic and atraumatic.      Right Ear: External ear normal.      Left Ear: External ear normal.   Eyes:      Conjunctiva/sclera: Conjunctivae normal.   Cardiovascular:      Rate and Rhythm: Normal rate and regular rhythm.      Pulses: Normal pulses.      Heart sounds: Normal heart sounds. No murmur heard.     No friction rub. No gallop.   Pulmonary:      Effort: Pulmonary effort is normal. No respiratory distress.      Breath sounds: Normal breath sounds. No stridor. No wheezing, rhonchi or rales.   Chest:      Chest wall: No " tenderness.   Abdominal:      General: Abdomen is flat.      Palpations: Abdomen is soft. There is no mass.      Tenderness: There is no abdominal tenderness.   Musculoskeletal:      Right lower leg: No edema.      Left lower leg: No edema.   Skin:     General: Skin is warm and dry.      Comments: Bruise noted, left medial shin proximal to ankle   Neurological:      General: No focal deficit present.      Mental Status: She is alert. Mental status is at baseline.   Psychiatric:         Behavior: Behavior normal.         Thought Content: Thought content normal.         Judgment: Judgment normal.        Result Review :   The following data was reviewed by: Julius Tijerina MD on 03/21/2024:  Common labs          1/9/2024    09:05 1/18/2024    05:24 1/19/2024    04:19   Common Labs   Glucose 108  138     BUN 18  15     Creatinine 0.96  0.78     Sodium 141  137     Potassium 4.4  4.0     Chloride 105  105     Calcium 9.2  8.6     Albumin 4.0      Total Bilirubin 0.2      Alkaline Phosphatase 62      AST (SGOT) 14      ALT (SGPT) 11      WBC 5.76      Hemoglobin 12.3  9.6  9.2    Hematocrit 38.0  30.0  28.9    Platelets 268      Hemoglobin A1C 6.50               Lab Results   Component Value Date    COVID19 NOT DETECTED 07/15/2020    INR 1.10 01/09/2024    BILIRUBINUR 1 mg/dL (A) 10/24/2023       Procedures        Assessment and Plan    Diagnoses and all orders for this visit:    1. Routine check-up (Primary)  -     Ambulatory Referral to Gastroenterology    2. Type 2 diabetes mellitus with hyperglycemia, unspecified whether long term insulin use  -     Comprehensive Metabolic Panel  -     Hemoglobin A1c    3. Essential hypertension  -     Comprehensive Metabolic Panel    4. Paroxysmal atrial fibrillation    5. S/P total knee arthroplasty, right    Other orders  -     ferrous sulfate (FerrouSul) 325 (65 FE) MG tablet; Take 1 tablet by mouth Daily With Breakfast.  Dispense: 90 tablet; Refill: 0  -     lisinopril  (PRINIVIL,ZESTRIL) 10 MG tablet; Take 1 tablet by mouth Daily.  Dispense: 90 tablet; Refill: 2      HTN:  -above goal of < 130/80  -will start lisinopril    T2DM:  -lbas today  -on metformin    A fib:  -stable, on coreg, cardizem, eliquis    Medications Discontinued During This Encounter   Medication Reason    ferrous sulfate (FerrouSul) 325 (65 FE) MG tablet Reorder          Follow Up   Return in about 3 months (around 6/21/2024) for Hypertension.  Patient was given instructions and counseling regarding her condition or for health maintenance advice. Please see specific information pulled into the AVS if appropriate.       Julius Tijerina MD  03/21/24  11:44 EDT

## 2024-04-01 ENCOUNTER — TELEPHONE (OUTPATIENT)
Dept: GASTROENTEROLOGY | Facility: CLINIC | Age: 79
End: 2024-04-01
Payer: MEDICARE

## 2024-04-01 NOTE — TELEPHONE ENCOUNTER
Ely Coats, 1945, has requested to transfer care from Peng Perry MD to Simba Nuno MD.    Reason for transfer: Patient would like to transfer care for a screening colonoscopy to Dr. Nuno. Patient states that Dr. Nuno was highly recommended to her.     Please review the patients records for possible transfer of care. The patient is aware that it is at the receiving provider's discretion to approve or deny this transfer request.

## 2024-04-03 NOTE — TELEPHONE ENCOUNTER
Per EWA Mcintyre to transfer care. Patient will need a screening colonoscopy DA appointment. I left pt a VM to return call to schedule.

## 2024-04-05 ENCOUNTER — TELEPHONE (OUTPATIENT)
Dept: GASTROENTEROLOGY | Facility: CLINIC | Age: 79
End: 2024-04-05
Payer: MEDICARE

## 2024-04-05 NOTE — TELEPHONE ENCOUNTER
Call placed to patient in attempt to schedule from referral-Patient states she will call back at a later time. Her  is currently having medical issues that she must tend to prior to procedure.

## 2024-04-10 ENCOUNTER — TELEPHONE (OUTPATIENT)
Dept: INTERNAL MEDICINE | Facility: CLINIC | Age: 79
End: 2024-04-10
Payer: MEDICARE

## 2024-04-10 RX ORDER — AZITHROMYCIN 250 MG/1
TABLET, FILM COATED ORAL
Qty: 6 TABLET | Refills: 0 | Status: SHIPPED | OUTPATIENT
Start: 2024-04-10

## 2024-04-10 NOTE — TELEPHONE ENCOUNTER
I've sent the requested medicine.  If things fail to improve, I'd like for her to be seen in clinic for an acute visit.

## 2024-04-10 NOTE — TELEPHONE ENCOUNTER
Caller: Ely Dee    Relationship: Self    Best call back number:     What medication are you requesting: Z-PACK     What are your current symptoms: CHEST CONGESTION,HEADACHE,SINUS PRESSURE     How long have you been experiencing symptoms: 3 DAYS     Have you had these symptoms before:    [x] Yes  [] No    Have you been treated for these symptoms before:   [x] Yes  [] No    If a prescription is needed, what is your preferred pharmacy and phone number: [facility] - [place] - [address] - [phone]  - [phone]

## 2024-04-10 NOTE — TELEPHONE ENCOUNTER
Contacted patient to attempt to make appointment. Patient states that her  is currently in hospice care and she will not be able to come in for an appointment but would like something to be sent in to help with her current symptoms.

## 2024-04-10 NOTE — TELEPHONE ENCOUNTER
Left message for patient to return call to clinic.    HUB TO READ:  I've sent the requested medicine.  If things fail to improve, I'd like for her to be seen in clinic for an acute visit.

## 2024-04-26 ENCOUNTER — TELEPHONE (OUTPATIENT)
Dept: INTERNAL MEDICINE | Facility: CLINIC | Age: 79
End: 2024-04-26
Payer: MEDICARE

## 2024-04-26 RX ORDER — DILTIAZEM HYDROCHLORIDE 240 MG/1
240 CAPSULE, COATED, EXTENDED RELEASE ORAL DAILY
Status: CANCELLED | OUTPATIENT
Start: 2024-04-26

## 2024-04-26 NOTE — TELEPHONE ENCOUNTER
I see both diltiazem  mg and dilt- mg.  Can you confirm which version she's taking?  She shouldn't be taking both.

## 2024-04-26 NOTE — TELEPHONE ENCOUNTER
Caller: Ely Dee    Relationship: Self    Best call back number: [phone]     Requested Prescriptions:   Requested Prescriptions     Pending Prescriptions Disp Refills    dilTIAZem CD (CARDIZEM CD) 240 MG 24 hr capsule       Sig: Take 1 capsule by mouth Daily.        [facility] [place] [address] - [phone]  - [phone] FX     Last office visit with prescribing clinician: 3/21/2024   Last telemedicine visit with prescribing clinician: Visit date not found   Next office visit with prescribing clinician: 6/19/2024     Does the patient have less than a 3 day supply:  [x] Yes  [] No    Would you like a call back once the refill request has been completed: [] Yes [x] No    If the office needs to give you a call back, can they leave a voicemail: [] Yes [x] No    Yessica Valentin, PCT   04/26/24 09:43 EDT

## 2024-04-30 ENCOUNTER — OFFICE VISIT (OUTPATIENT)
Dept: ORTHOPEDIC SURGERY | Facility: CLINIC | Age: 79
End: 2024-04-30
Payer: MEDICARE

## 2024-04-30 VITALS
SYSTOLIC BLOOD PRESSURE: 126 MMHG | HEART RATE: 62 BPM | WEIGHT: 153.22 LBS | HEIGHT: 64 IN | OXYGEN SATURATION: 95 % | DIASTOLIC BLOOD PRESSURE: 77 MMHG | BODY MASS INDEX: 26.16 KG/M2

## 2024-04-30 DIAGNOSIS — Z47.89 AFTERCARE FOLLOWING SURGERY OF THE MUSCULOSKELETAL SYSTEM: Primary | ICD-10-CM

## 2024-04-30 DIAGNOSIS — M25.561 RIGHT KNEE PAIN, UNSPECIFIED CHRONICITY: ICD-10-CM

## 2024-04-30 NOTE — PROGRESS NOTES
"Chief Complaint  Pain and Follow-up of the Right Knee    Subjective          History of Present Illness      Ely Coats is a 79 y.o. female  presents to [facility] for     Patient presents for follow-up evaluation of right total knee arthroplasty 1/17/2024.  Patient states she has continued pain getting in and out of car, walking or standing for too long bending the knee when she walks causes some pain she states the pain she is to have at night has improved.  She states that she takes Tylenol only occasionally.  She presents without a cane or walker.  She states that since her last visit her  passed away.  She has been dealing with this and has not had time to focus on her knee.  She only went to few more visits of therapy since the last evaluation due to the death of her .      Allergies   Allergen Reactions    Penicillins Rash     Mild rash- pt states can take amoxicillin or keflex      Statins Other (See Comments)     Hair loss crestor        Social History     Socioeconomic History    Marital status:    Tobacco Use    Smoking status: Never    Smokeless tobacco: Never   Vaping Use    Vaping status: Never Used   Substance and Sexual Activity    Alcohol use: Never    Drug use: Never    Sexual activity: Defer        REVIEW OF SYSTEMS    Constitutional: Awake alert and oriented x3, no acute distress, denies fevers, chills, weight loss  Respiratory: No respiratory distress  Vascular: Brisk cap refill, Intact distal pulses, No cyanosis, compartments soft with no signs or symptoms of compartment syndrome or DVT.   Cardiovascular: Denies chest pain, shortness of breath  Skin: Denies rashes, acute skin changes  Neurologic: Denies headache, loss of consciousness  MSK: Right knee pain      Objective   Vital Signs:   /77   Pulse 62   Ht 162.6 cm (64\")   Wt 69.5 kg (153 lb 3.5 oz)   SpO2 95%   BMI 26.30 kg/m²     Body mass index is 26.3 " kg/m².    Physical Exam       Right knee: Well healed incision, no erythema, no ecchymosis, no swelling, no effusion, no signs of infection, full extension, flexion 120, stable anterior/posterior drawer, stable to varus/valgus stress.  Nontender to palpation, no pain with range of motion.  5 out of 5 strength, no pain with resisted range of motion      Procedures    Imaging Results (Most Recent)       Procedure Component Value Units Date/Time    XR Knee 3 View Right [[id number]] Resulted: 04/30/24 1047     Updated: 04/30/24 1047    Narrative:      View:AP/Lateral and [place] view(s)  Site: Right knee  Indication: Right knee pain  Study: X-rays ordered, taken in the office, and reviewed today  X-ray: Intact appearing right total knee arthroplasty, no signs of   hardware failure or loosening, no subsidence or periprosthetic fracture,   good alignment  Comparative data: Previous studies             Result Review :   The following data was reviewed by: BROOKE Bryant on 04/30/2024:  Data reviewed : Radiologic studies reviewed by me with the patient              Assessment and Plan    Diagnoses and all orders for this visit:    1. Aftercare following surgery of right total knee arthroplasty 1/17/2024 (Primary)    2. Right knee pain, unspecified chronicity  -     XR Knee 3 View Right        Reviewed x-rays with the patient discussed diagnosis and treatment options with her she was advised to continue activity and weightbearing as tolerated, she will continue home exercises she declined offer for her to restart therapy, follow-up in 3 months.    Call or return if worsening symptoms.    Follow Up   Return in about 3 months (around 7/30/2024) for Recheck.  Patient was given instructions and counseling regarding her condition or for health maintenance advice. Please see specific information pulled into the AVS if appropriate.       EMR Dragon/Transcription disclaimer:  Part of this note may be an electronic  transcription/translation of spoken language to printed text using the Dragon Dictation System

## 2024-05-06 ENCOUNTER — TELEPHONE (OUTPATIENT)
Dept: ORTHOPEDIC SURGERY | Facility: CLINIC | Age: 79
End: 2024-05-06
Payer: MEDICARE

## 2024-05-15 ENCOUNTER — PREP FOR SURGERY (OUTPATIENT)
Dept: OTHER | Facility: HOSPITAL | Age: 79
End: 2024-05-15
Payer: MEDICARE

## 2024-05-15 ENCOUNTER — CLINICAL SUPPORT (OUTPATIENT)
Dept: GASTROENTEROLOGY | Facility: CLINIC | Age: 79
End: 2024-05-15
Payer: MEDICARE

## 2024-05-15 ENCOUNTER — TELEPHONE (OUTPATIENT)
Dept: GASTROENTEROLOGY | Facility: CLINIC | Age: 79
End: 2024-05-15
Payer: MEDICARE

## 2024-05-15 ENCOUNTER — TELEPHONE (OUTPATIENT)
Dept: CARDIOLOGY | Facility: CLINIC | Age: 79
End: 2024-05-15

## 2024-05-15 DIAGNOSIS — Z86.010 HISTORY OF COLON POLYPS: ICD-10-CM

## 2024-05-15 DIAGNOSIS — Z12.11 ENCOUNTER FOR SCREENING COLONOSCOPY: Primary | ICD-10-CM

## 2024-05-15 PROBLEM — Z86.0100 HISTORY OF COLON POLYPS: Status: ACTIVE | Noted: 2024-05-15

## 2024-05-15 RX ORDER — PEG-3350, SODIUM SULFATE, SODIUM CHLORIDE, POTASSIUM CHLORIDE, SODIUM ASCORBATE AND ASCORBIC ACID 7.5-2.691G
1000 KIT ORAL TAKE AS DIRECTED
Qty: 1000 ML | Refills: 0 | Status: SHIPPED | OUTPATIENT
Start: 2024-05-15

## 2024-05-15 NOTE — TELEPHONE ENCOUNTER
Pt's LOV was February 2023.  I called pt to schedule an appt as she was due back in February for her annual, and a more recent appt would be needed to provide cardiac clearance.    Pt shares that she's no longer coming to [place] routinely and she's going to try to find a cardiologist in [facility] that she can follow moving forward.  I encouraged for her to call our office if this is unsuccessful, so that we could see her to provide clearance.  She verbalized understanding.    Thank you,    Holly MEDINA , RN  Triage [facility]  05/15/24 13:29 EDT

## 2024-05-15 NOTE — TELEPHONE ENCOUNTER
Spoke with  pt. Advised to establish with local cardiology, so she could be cleared before her scheduled colonoscopy.  Other wise we will have to reschedule her scope. Pt voiced understanding. chris

## 2024-05-15 NOTE — TELEPHONE ENCOUNTER
5/15/2024    Dear Dr Friend,     Patient: Ely Coats   YOB: 1945        This patient is scheduled to have a Colonoscopy which I will perform at [facility] on 7.17.24.     Our records indicate this patient is currently taking ELIQUIS. This procedure requires the patient to suspend their anticoagulant medication prior to surgery.     Please respond to this request noting your recommendations. You may contact our office at [phone] Option 1 with any questions. I appreciate your prompt response in this matter.     Please return this form to our office no later than two weeks prior to the procedure date listed above. Please return form to [phone].     ____ I approve my patient to stop taking their ELIQUIS medication 2 days prior to the scheduled procedure.    ____ I do NOT approve my patient to stop taking their ELIQUIS medication at this time.    ____ I approve my patient from a Cardiac standpoint    ____ I do NOT approve my patient from a Cardiac  standpoint      Please specify clearance expiration date:_____________________________    Approving physician name (please print):     _____________________________________________    Approving physician signature:     ________________________________    Date:________________      Sincerely,  [facility]   Gastroenterology - Dr.Kevin Nuno

## 2024-05-15 NOTE — TELEPHONE ENCOUNTER
Caller: VANDANA    Relationship: SELF    Best call back number: [phone]    Who is your current provider: MARKO LINK    Is your current provider offboarding? NO    Who would you like your new provider to be: OMAR MATTHEWS    What are your reasons for transferring care: PATIENT UNABLE TO DO THE DRIVE TO [place] - WOULD LIKE TO SEE PROVIDER LOCALLY.     Additional notes: PLEASE CALL PATIENT TO SCHEDULE. PATIENT IS ALSO NEEDING CARDIAC CLEARANCE BEFORE 7.17.24. THANK YOU!

## 2024-05-15 NOTE — PROGRESS NOTES
Ely Coats  1945  79 y.o.    Reason for call: Screening Colonoscopy, last 5 yrs ago in [place],  Colon Polyps  Prep prescribed: Moviprep  Prep instructions reviewed with patient and sent to patient via regular mail to the home address on file  Is the patient currently on any injectable or oral medications for weight loss or diabetes? Yes  Clearance needed? Yes  If yes, what clearance is needed? Blood thinner  Clearance has been requested from PCP and Cardio  The patient has been scheduled for: Colonoscopy  Family history of colon cancer? No  If yes, indicate relative:   Family History   Problem Relation Age of Onset    Heart disease Mother         afib    Lung disease Mother         interstitial lung disease    Stroke Father         hemmorhagic stroke-aneurysm    Diabetes Brother     Malig Hyperthermia Neg Hx     Colon cancer Neg Hx      Past Medical History:   Diagnosis Date    Atrial fibrillation with RVR     FOLLOWS W/LINK, NO RECENT ISSUES, NO CP    Calculus of gallbladder with acute on chronic cholecystitis with obstruction 05/09/2022    Cancer     SKIN CANCER REMOVED    Colon polyp     Contusion of face     Diabetes mellitus     BS IN  THE A.M. 120 ON AVERAGE    Essential hypertension     CONT W/MEDS    Fall     Gallstones     Hyperlipidemia     Kidney stone     Osteoarthritis     Osteopenia     Renal insufficiency     due to nsaids (8/2017 states labs wnl) NO CURRENT PROBLEMS    Snoring     NO CPAP    Wound infection after surgery 05/09/2022     Allergies   Allergen Reactions    Penicillins Rash     Mild rash- pt states can take amoxicillin or keflex      Statins Other (See Comments)     Hair loss crestor     Past Surgical History:   Procedure Laterality Date    BLEPHAROPLASTY Bilateral     CARDIAC CATHETERIZATION Left 2011    [facility], Dr. Cooper Ramires, NO INTERVENTION    CHOLECYSTECTOMY N/A 5/4/2022    Procedure: CHOLECYSTECTOMY LAPAROSCOPIC;  Surgeon: Josue Jernigan,  MD;  Location: [facility] MAIN OR;  Service: General;  Laterality: N/A;    COLONOSCOPY  2014    negative    COLONOSCOPY  2011    polyp-repeat 2014    CYSTOSCOPY  09/2016    negative- Dr. Murphy per patient urethral polyp noted    ERCP N/A 04/20/2022    Procedure: ENDOSCOPIC RETROGRADE CHOLANGIOPANCREATOGRAPHY WITH SPHINCTEROTOMY, BALLOON SWEEP, COMMON BILE DUCT STENT PLACED, PANCREATIC DUCT STENT PLACED;  Surgeon: Gurvinder Perry MD;  Location: [facility] ENDOSCOPY;  Service: Gastroenterology;  Laterality: N/A;  BILE DUCT STONES    ERCP N/A 5/10/2022    Procedure: ENDOSCOPIC RETROGRADE CHOLANGIOPANCREATOGRAPHY WITH STENT REMOVAL X2, SPHINCTEROTOMY, 12-15MM BALLOON SWEEP WITH BILE DUCT STONE REMOVAL;  Surgeon: Gurvinder Perry MD;  Location: [facility] ENDOSCOPY;  Service: Gastroenterology;  Laterality: N/A;  BILE DUCT STONES    SKIN CANCER EXCISION  04/2016    basal cell on back    TOTAL KNEE ARTHROPLASTY Right 1/17/2024    Procedure: RIGHT TOTAL KNEE ARTHROPLASTY WITH CLARISSA ROBOT;  Surgeon: Geraldo Gann MD;  Location: [facility] MAIN OR;  Service: Robotics - Ortho;  Laterality: Right;    TUBAL ABDOMINAL LIGATION      WRIST FRACTURE SURGERY Left     ORIF     Social History     Socioeconomic History    Marital status:    Tobacco Use    Smoking status: Never     Passive exposure: Past    Smokeless tobacco: Never   Vaping Use    Vaping status: Never Used   Substance and Sexual Activity    Alcohol use: Never    Drug use: Never    Sexual activity: Defer       Current Outpatient Medications:     Accu-Chek Guide test strip, USE 1 STRIP TWICE DAILY, Disp: , Rfl:     apixaban (ELIQUIS) 5 MG tablet tablet, Take 1 tablet by mouth 2 (Two) Times a Day., Disp: 60 tablet, Rfl: 2    carvedilol (COREG) 6.25 MG tablet, Take 1 tablet by mouth 2 (Two) Times a Day With Meals., Disp: , Rfl:     cyanocobalamin (VITAMIN B-12) 1000 MCG tablet, Take 0.5 tablets by mouth Daily., Disp: , Rfl:     dilTIAZem XR (DILACOR XR) 240 MG 24 hr  capsule, Take 1 capsule by mouth Daily., Disp: , Rfl:     ferrous sulfate (FerrouSul) 325 (65 FE) MG tablet, Take 1 tablet by mouth Daily With Breakfast., Disp: 90 tablet, Rfl: 0    lisinopril (PRINIVIL,ZESTRIL) 10 MG tablet, Take 1 tablet by mouth Daily., Disp: 90 tablet, Rfl: 2    metFORMIN (GLUCOPHAGE) 500 MG tablet, Take 1 tablet by mouth Daily With Breakfast., Disp: , Rfl:     polyethylene glycol (MIRALAX) 17 GM/SCOOP powder, Take 17 g by mouth 2 (Two) Times a Day As Needed (constipation)., Disp: 238 g, Rfl: 0    furosemide (LASIX) 20 MG tablet, Take 1 tablet by mouth Daily. (Patient not taking: Reported on 5/15/2024), Disp: , Rfl:     MAGNESIUM PO, Take 1 tablet by mouth Daily. (Patient not taking: Reported on 5/15/2024), Disp: , Rfl:     Multiple Vitamin (MULTIVITAMIN) tablet, Take 1 tablet by mouth Daily. (Patient not taking: Reported on 5/15/2024), Disp: , Rfl:

## 2024-05-28 RX ORDER — APIXABAN 5 MG/1
5 TABLET, FILM COATED ORAL 2 TIMES DAILY
Qty: 60 TABLET | Refills: 2 | Status: SHIPPED | OUTPATIENT
Start: 2024-05-28

## 2024-06-18 NOTE — PROGRESS NOTES
Chief Complaint  Hypertension, Hyperlipidemia, Diabetes, and Cough (Patient states her cough is still going on, she states this has been going on since 3 months ago at her last appointment. Patient refuses sick testing today. )    Subjective          Ely Coats presents to [facility]  History of Present Illness     passed away in April.    Has had persistent cough for several months.  Mostly dry, but occasionally productive of clear or yellow sputum.  No hemoptysis.  No fever or chills or other sick symptoms.  She did hold her lisinopril for 5 days, and there was no effect on her cough.    Follows with [facility].  Reports reassuring diabetic eye exam, although she does have cataracts that they would like to operate on.  She will see them again for follow up this Friday.    Mrs Lizzette Coats does not routinely check her blood sugar.      PHQ-9 Depression Screening  Little interest or pleasure in doing things? 0-->not at all   Feeling down, depressed, or hopeless? 1-->several days   Trouble falling or staying asleep, or sleeping too much?     Feeling tired or having little energy?     Poor appetite or overeating?     Feeling bad about yourself - or that you are a failure or have let yourself or your family down?     Trouble concentrating on things, such as reading the newspaper or watching television?     Moving or speaking so slowly that other people could have noticed? Or the opposite - being so fidgety or restless that you have been moving around a lot more than usual?     Thoughts that you would be better off dead, or of hurting yourself in some way?     PHQ-9 Total Score 1   If you checked off any problems, how difficult have these problems made it for you to do your work, take care of things at home, or get along with other people?           Current Outpatient Medications   Medication Instructions    Accu-Chek Guide test strip  "USE 1 STRIP TWICE DAILY    carvedilol (COREG) 6.25 MG tablet 1 tablet, Oral, 2 Times Daily With Meals    cyanocobalamin (VITAMIN B-12) 500 mcg, Oral, Daily    dilTIAZem XR (DILACOR XR) 240 mg, Oral, Daily    Eliquis 5 mg, Oral, 2 Times Daily    ferrous sulfate (FERROUSUL) 325 mg, Oral, Daily With Breakfast    furosemide (LASIX) 20 mg, Daily    lisinopril (PRINIVIL,ZESTRIL) 10 mg, Oral, Daily    MAGNESIUM PO 1 tablet, Daily    metFORMIN (GLUCOPHAGE) 500 mg, Oral, Daily With Breakfast    Multiple Vitamin (MULTIVITAMIN) tablet 1 tablet, Oral, Daily    PEG-KCl-NaCl-NaSulf-Na Asc-C (MoviPrep) 100 g reconstituted solution powder 1,000 mL, Oral, Take As Directed    polyethylene glycol (MIRALAX) 17 g, Oral, 2 Times Daily PRN       The following portions of the patient's history were reviewed and updated as appropriate: allergies, current medications, past family history, past medical history, past social history, past surgical history, and problem list.    Objective   Vital Signs:   /69 (BP Location: Right arm, Patient Position: Sitting, Cuff Size: Adult)   Pulse 64   Temp 97.1 °F (36.2 °C) (Temporal)   Ht 162.6 cm (64\")   Wt 67.6 kg (149 lb)   SpO2 95%   BMI 25.58 kg/m²     BP Readings from Last 3 Encounters:   06/19/24 110/69   04/30/24 126/77   03/21/24 148/80     Wt Readings from Last 3 Encounters:   06/19/24 67.6 kg (149 lb)   04/30/24 69.5 kg (153 lb 3.5 oz)   03/21/24 69.5 kg (153 lb 3.2 oz)           Physical Exam  Vitals reviewed.   Constitutional:       General: She is not in acute distress.     Appearance: Normal appearance. She is not ill-appearing, toxic-appearing or diaphoretic.   HENT:      Head: Normocephalic and atraumatic.      Right Ear: External ear normal.      Left Ear: External ear normal.   Eyes:      Conjunctiva/sclera: Conjunctivae normal.   Cardiovascular:      Rate and Rhythm: Normal rate and regular rhythm.      Pulses: Normal pulses.      Heart sounds: Normal heart sounds. No murmur " heard.     No friction rub. No gallop.   Pulmonary:      Effort: Pulmonary effort is normal. No respiratory distress.      Breath sounds: Normal breath sounds. No stridor. No wheezing, rhonchi or rales.   Chest:      Chest wall: No tenderness.   Abdominal:      General: Abdomen is flat.      Palpations: Abdomen is soft. There is no mass.      Tenderness: There is no abdominal tenderness.   Musculoskeletal:      Right lower leg: No edema.      Left lower leg: No edema.   Skin:     General: Skin is warm and dry.      Comments: Purplish papule noted lower lip of face   Neurological:      General: No focal deficit present.      Mental Status: She is alert. Mental status is at baseline.   Psychiatric:         Behavior: Behavior normal.         Thought Content: Thought content normal.         Judgment: Judgment normal.        Result Review :   The following data was reviewed by: Julius Tijerina MD on 06/19/2024:  Common labs          1/18/2024    05:24 1/19/2024    04:19 3/21/2024    11:32   Common Labs   Glucose 138   94    BUN 15   15    Creatinine 0.78   0.82    Sodium 137   142    Potassium 4.0   3.8    Chloride 105   106    Calcium 8.6   9.1    Albumin   4.2    Total Bilirubin   0.4    Alkaline Phosphatase   56    AST (SGOT)   14    ALT (SGPT)   10    Hemoglobin 9.6  9.2     Hematocrit 30.0  28.9     Hemoglobin A1C   6.10             Lab Results   Component Value Date    COVID19 NOT DETECTED 07/15/2020    INR 1.10 01/09/2024    BILIRUBINUR 1 mg/dL (A) 10/24/2023       Procedures        Assessment and Plan    Diagnoses and all orders for this visit:    1. Chronic cough (Primary)  -     XR Chest PA & Lateral; Future    2. Essential hypertension  -     Comprehensive Metabolic Panel    3. Type 2 diabetes mellitus with hyperglycemia, unspecified whether long term insulin use  -     Comprehensive Metabolic Panel  -     Hemoglobin A1c    4. Paroxysmal atrial fibrillation    5. Mixed hyperlipidemia    6. Papule  (lip)      Chronic cough:  -will obtain chest x-ray  -if evidence of airspace disease, will treat with antibiotics  -if negative, will trial PRN albuterol and order PFTs  -I have considered chronic lung disease (including COPD or asthma), indolent pulmonary infection, bronchiectasis, medication side effect  -cough persistently present both on and off lisinopril.  For this reason, I think it unlikely to be a cause of contributor    HTN:  -BP at goal of < 130/80  -on coreg, lisinopril    T2DM:  -labs today  -on metformin    A fib:  -on coreg, eliquis    Papule (lip):  -discussed and recommended evaluation by plastic surgery for removal  -she is worried about insurance coverage for the procedure.  I did point out that an evaluation by plastic surgery does not mean a commitment to the procedure if it's not covered  -I also discussed the possibility of an outpatient plastic surgery procedure via surgery on Sunday in [place]    There are no discontinued medications.       Follow Up   Return in about 3 months (around 9/19/2024) for Medicare Wellness.  Patient was given instructions and counseling regarding her condition or for health maintenance advice. Please see specific information pulled into the AVS if appropriate.       Julius Tijerina MD  06/19/24  09:48 EDT

## 2024-06-19 ENCOUNTER — OFFICE VISIT (OUTPATIENT)
Dept: INTERNAL MEDICINE | Facility: CLINIC | Age: 79
End: 2024-06-19
Payer: MEDICARE

## 2024-06-19 ENCOUNTER — HOSPITAL ENCOUNTER (OUTPATIENT)
Dept: GENERAL RADIOLOGY | Facility: HOSPITAL | Age: 79
Discharge: HOME | End: 2024-06-19
Admitting: STUDENT IN AN ORGANIZED HEALTH CARE EDUCATION/TRAINING PROGRAM
Payer: MEDICARE

## 2024-06-19 VITALS
OXYGEN SATURATION: 95 % | HEIGHT: 64 IN | DIASTOLIC BLOOD PRESSURE: 69 MMHG | WEIGHT: 149 LBS | TEMPERATURE: 97.1 F | HEART RATE: 64 BPM | BODY MASS INDEX: 25.44 KG/M2 | SYSTOLIC BLOOD PRESSURE: 110 MMHG

## 2024-06-19 DIAGNOSIS — E78.2 MIXED HYPERLIPIDEMIA: ICD-10-CM

## 2024-06-19 DIAGNOSIS — R05.3 CHRONIC COUGH: Primary | ICD-10-CM

## 2024-06-19 DIAGNOSIS — I48.0 PAROXYSMAL ATRIAL FIBRILLATION: ICD-10-CM

## 2024-06-19 DIAGNOSIS — I10 ESSENTIAL HYPERTENSION: ICD-10-CM

## 2024-06-19 DIAGNOSIS — R05.3 CHRONIC COUGH: ICD-10-CM

## 2024-06-19 DIAGNOSIS — E11.65 TYPE 2 DIABETES MELLITUS WITH HYPERGLYCEMIA, UNSPECIFIED WHETHER LONG TERM INSULIN USE: ICD-10-CM

## 2024-06-19 DIAGNOSIS — R23.8 PAPULE: ICD-10-CM

## 2024-06-19 LAB
ALBUMIN SERPL-MCNC: 4 G/DL (ref 3.5–5.2)
ALBUMIN/GLOB SERPL: 1.7 G/DL
ALP SERPL-CCNC: 75 U/L (ref 39–117)
ALT SERPL W P-5'-P-CCNC: 10 U/L (ref 1–33)
ANION GAP SERPL CALCULATED.3IONS-SCNC: 10 MMOL/L (ref 5–15)
AST SERPL-CCNC: 15 U/L (ref 1–32)
BILIRUB SERPL-MCNC: 0.2 MG/DL (ref 0–1.2)
BUN SERPL-MCNC: 14 MG/DL (ref 8–23)
BUN/CREAT SERPL: 22.2 (ref 7–25)
CALCIUM SPEC-SCNC: 9.2 MG/DL (ref 8.6–10.5)
CHLORIDE SERPL-SCNC: 105 MMOL/L (ref 98–107)
CO2 SERPL-SCNC: 25 MMOL/L (ref 22–29)
CREAT SERPL-MCNC: 0.63 MG/DL (ref 0.57–1)
EGFRCR SERPLBLD CKD-EPI 2021: 89.3 ML/MIN/1.73
GLOBULIN UR ELPH-MCNC: 2.4 GM/DL
GLUCOSE SERPL-MCNC: 112 MG/DL (ref 65–99)
HBA1C MFR BLD: 6.3 % (ref 4.8–5.6)
POTASSIUM SERPL-SCNC: 4.3 MMOL/L (ref 3.5–5.2)
PROT SERPL-MCNC: 6.4 G/DL (ref 6–8.5)
SODIUM SERPL-SCNC: 140 MMOL/L (ref 136–145)

## 2024-06-19 PROCEDURE — 80053 COMPREHEN METABOLIC PANEL: CPT | Performed by: STUDENT IN AN ORGANIZED HEALTH CARE EDUCATION/TRAINING PROGRAM

## 2024-06-19 PROCEDURE — 1126F AMNT PAIN NOTED NONE PRSNT: CPT | Performed by: STUDENT IN AN ORGANIZED HEALTH CARE EDUCATION/TRAINING PROGRAM

## 2024-06-19 PROCEDURE — 83036 HEMOGLOBIN GLYCOSYLATED A1C: CPT | Performed by: STUDENT IN AN ORGANIZED HEALTH CARE EDUCATION/TRAINING PROGRAM

## 2024-06-19 PROCEDURE — 3078F DIAST BP <80 MM HG: CPT | Performed by: STUDENT IN AN ORGANIZED HEALTH CARE EDUCATION/TRAINING PROGRAM

## 2024-06-19 PROCEDURE — 99214 OFFICE O/P EST MOD 30 MIN: CPT | Performed by: STUDENT IN AN ORGANIZED HEALTH CARE EDUCATION/TRAINING PROGRAM

## 2024-06-19 PROCEDURE — 71046 X-RAY EXAM CHEST 2 VIEWS: CPT

## 2024-06-19 PROCEDURE — G2211 COMPLEX E/M VISIT ADD ON: HCPCS | Performed by: STUDENT IN AN ORGANIZED HEALTH CARE EDUCATION/TRAINING PROGRAM

## 2024-06-19 PROCEDURE — 3074F SYST BP LT 130 MM HG: CPT | Performed by: STUDENT IN AN ORGANIZED HEALTH CARE EDUCATION/TRAINING PROGRAM

## 2024-06-20 DIAGNOSIS — R05.3 CHRONIC COUGH: Primary | ICD-10-CM

## 2024-06-20 RX ORDER — ALBUTEROL SULFATE 90 UG/1
2 AEROSOL, METERED RESPIRATORY (INHALATION) EVERY 4 HOURS PRN
Qty: 18 G | Refills: 3 | Status: SHIPPED | OUTPATIENT
Start: 2024-06-20

## 2024-06-26 ENCOUNTER — OFFICE VISIT (OUTPATIENT)
Dept: CARDIOLOGY | Facility: CLINIC | Age: 79
End: 2024-06-26
Payer: MEDICARE

## 2024-06-26 VITALS
HEART RATE: 77 BPM | WEIGHT: 152 LBS | BODY MASS INDEX: 25.95 KG/M2 | SYSTOLIC BLOOD PRESSURE: 130 MMHG | HEIGHT: 64 IN | DIASTOLIC BLOOD PRESSURE: 77 MMHG

## 2024-06-26 DIAGNOSIS — I48.0 PAROXYSMAL ATRIAL FIBRILLATION: Primary | ICD-10-CM

## 2024-06-26 DIAGNOSIS — I10 HYPERTENSION, ESSENTIAL: ICD-10-CM

## 2024-06-26 NOTE — PROGRESS NOTES
Chief Complaint  Atrial Fibrillation, Hypertension, Surgical Clearance, and Establish Care    Subjective            Ely Coats presents to [facility] CARDIOLOGY  Atrial Fibrillation  Symptoms include palpitations. Symptoms are negative for chest pain and shortness of breath. Past medical history includes atrial fibrillation.   Hypertension  Associated symptoms include palpitations. Pertinent negatives include no chest pain, malaise/fatigue or shortness of breath.       79-year-old white female.  She has paroxysmal atrial fibrillation but has been clinically stable over the last few years.  She is changing cardiologist due to proximity to home.  She has been clinically stable.  She has complained of nighttime palpitations somewhat chronically but those are self-limited at this point.  She stays relatively active.  She is compliant with her medical therapy.    PMH  Past Medical History:   Diagnosis Date    Atrial fibrillation with RVR     FOLLOWS W/FARIBA, NO RECENT ISSUES, NO CP    Calculus of gallbladder with acute on chronic cholecystitis with obstruction 05/09/2022    Cancer     SKIN CANCER REMOVED    Colon polyp     Contusion of face     Diabetes mellitus     BS IN  THE A.M. 120 ON AVERAGE    Essential hypertension     CONT W/MEDS    Fall     Gallstones     Hyperlipidemia     Kidney stone     Osteoarthritis     Osteopenia     Renal insufficiency     due to nsaids (8/2017 states labs wnl) NO CURRENT PROBLEMS    Snoring     NO CPAP    Wound infection after surgery 05/09/2022         SURGICALHX  Past Surgical History:   Procedure Laterality Date    BLEPHAROPLASTY Bilateral     CARDIAC CATHETERIZATION Left 2011    [facility], Dr. Cooper Ramires, NO INTERVENTION    CHOLECYSTECTOMY N/A 5/4/2022    Procedure: CHOLECYSTECTOMY LAPAROSCOPIC;  Surgeon: Josue Jernigan MD;  Location: [facility] MAIN OR;  Service: General;  Laterality: N/A;    COLONOSCOPY  2014    negative    COLONOSCOPY   2011    polyp-repeat 2014    CYSTOSCOPY  09/2016    negative- Dr. Murphy per patient urethral polyp noted    ERCP N/A 04/20/2022    Procedure: ENDOSCOPIC RETROGRADE CHOLANGIOPANCREATOGRAPHY WITH SPHINCTEROTOMY, BALLOON SWEEP, COMMON BILE DUCT STENT PLACED, PANCREATIC DUCT STENT PLACED;  Surgeon: Gurvinder Perry MD;  Location: [facility] ENDOSCOPY;  Service: Gastroenterology;  Laterality: N/A;  BILE DUCT STONES    ERCP N/A 5/10/2022    Procedure: ENDOSCOPIC RETROGRADE CHOLANGIOPANCREATOGRAPHY WITH STENT REMOVAL X2, SPHINCTEROTOMY, 12-15MM BALLOON SWEEP WITH BILE DUCT STONE REMOVAL;  Surgeon: Gurvinder Perry MD;  Location: [facility] ENDOSCOPY;  Service: Gastroenterology;  Laterality: N/A;  BILE DUCT STONES    SKIN CANCER EXCISION  04/2016    basal cell on back    TOTAL KNEE ARTHROPLASTY Right 1/17/2024    Procedure: RIGHT TOTAL KNEE ARTHROPLASTY WITH CLARISSA ROBOT;  Surgeon: Geraldo Gann MD;  Location: [facility] MAIN OR;  Service: Robotics - Ortho;  Laterality: Right;    TUBAL ABDOMINAL LIGATION      WRIST FRACTURE SURGERY Left     ORIF        SOC  Social History     Socioeconomic History    Marital status:    Tobacco Use    Smoking status: Never     Passive exposure: Past    Smokeless tobacco: Never   Vaping Use    Vaping status: Never Used   Substance and Sexual Activity    Alcohol use: Never    Drug use: Never    Sexual activity: Defer         FAMHX  Family History   Problem Relation Age of Onset    Heart disease Mother         afib    Lung disease Mother         interstitial lung disease    Stroke Father         hemmorhagic stroke-aneurysm    Diabetes Brother     Malig Hyperthermia Neg Hx     Colon cancer Neg Hx           ALLERGY  Allergies   Allergen Reactions    Penicillins Rash     Mild rash- pt states can take amoxicillin or keflex      Statins Other (See Comments)     Hair loss crestor        MEDSCURRENT    Current Outpatient Medications:     Accu-Chek Guide test strip, USE 1 STRIP TWICE DAILY,  Disp: , Rfl:     albuterol sulfate  (90 Base) MCG/ACT inhaler, Inhale 2 puffs Every 4 (Four) Hours As Needed for Wheezing or Shortness of Air., Disp: 18 g, Rfl: 3    carvedilol (COREG) 6.25 MG tablet, Take 1 tablet by mouth 2 (Two) Times a Day With Meals., Disp: , Rfl:     cyanocobalamin (VITAMIN B-12) 1000 MCG tablet, Take 0.5 tablets by mouth Daily., Disp: , Rfl:     dilTIAZem XR (DILACOR XR) 240 MG 24 hr capsule, Take 1 capsule by mouth Daily., Disp: , Rfl:     Eliquis 5 MG tablet tablet, TAKE 1 TABLET BY MOUTH TWICE DAILY, Disp: 60 tablet, Rfl: 2    ferrous sulfate (FerrouSul) 325 (65 FE) MG tablet, Take 1 tablet by mouth Daily With Breakfast., Disp: 90 tablet, Rfl: 0    metFORMIN (GLUCOPHAGE) 500 MG tablet, Take 1 tablet by mouth Daily With Breakfast., Disp: , Rfl:     Multiple Vitamin (MULTIVITAMIN) tablet, Take 1 tablet by mouth Daily., Disp: , Rfl:     polyethylene glycol (MIRALAX) 17 GM/SCOOP powder, Take 17 g by mouth 2 (Two) Times a Day As Needed (constipation)., Disp: 238 g, Rfl: 0    furosemide (LASIX) 20 MG tablet, Take 1 tablet by mouth Daily. (Patient not taking: Reported on 5/15/2024), Disp: , Rfl:     lisinopril (PRINIVIL,ZESTRIL) 10 MG tablet, Take 1 tablet by mouth Daily. (Patient not taking: Reported on 6/26/2024), Disp: 90 tablet, Rfl: 2    MAGNESIUM PO, Take 1 tablet by mouth Daily. (Patient not taking: Reported on 5/15/2024), Disp: , Rfl:     PEG-KCl-NaCl-NaSulf-Na Asc-C (MoviPrep) 100 g reconstituted solution powder, Take 1,000 mL by mouth Take As Directed. (Patient not taking: Reported on 6/19/2024), Disp: 1000 mL, Rfl: 0      Review of Systems   Constitutional: Negative. Negative for malaise/fatigue.   HENT: Negative.     Eyes: Negative.    Cardiovascular:  Positive for palpitations. Negative for chest pain and leg swelling.   Respiratory: Negative.  Negative for shortness of breath.    Endocrine: Negative.    Hematologic/Lymphatic: Negative.    Skin: Negative.    Musculoskeletal:  "Negative.    Gastrointestinal: Negative.    Genitourinary: Negative.    Neurological: Negative.    Psychiatric/Behavioral: Negative.          Objective     /77   Pulse 77   Ht 162.6 cm (64\")   Wt 68.9 kg (152 lb)   BMI 26.09 kg/m²       General Appearance:   well developed  well nourished  HENT:   oropharynx moist  lips not cyanotic  Neck:  thyroid not enlarged  supple  Respiratory:  no respiratory distress  normal breath sounds  no rales  Cardiovascular:  no jugular venous distention  regular rhythm  apical impulse normal  S1 normal, S2 normal  no S3, no S4   no murmur  no rub, no thrill  carotid pulses normal; no bruit  pedal pulses normal  lower extremity edema: none    Musculoskeletal:  no clubbing of fingers.   normocephalic, head atraumatic  Skin:   warm, dry  Psychiatric:  judgement and insight appropriate  normal mood and affect      Result Review :     The following data was reviewed by: Sidney Suazo MD on 06/26/2024:    CMP          1/18/2024    05:24 3/21/2024    11:32 6/19/2024    09:40   CMP   Glucose 138  94  112    BUN 15  15  14    Creatinine 0.78  0.82  0.63    EGFR 76.9  72.0  89.3    Sodium 137  142  140    Potassium 4.0  3.8  4.3    Chloride 105  106  105    Calcium 8.6  9.1  9.2    Total Protein  6.2  6.4    Albumin  4.2  4.0    Globulin  2.0  2.4    Total Bilirubin  0.4  0.2    Alkaline Phosphatase  56  75    AST (SGOT)  14  15    ALT (SGPT)  10  10    Albumin/Globulin Ratio  2.1  1.7    BUN/Creatinine Ratio 19.2  18.3  22.2    Anion Gap 9.4  12.1  10.0      CBC          1/9/2024    09:05 1/18/2024    05:24 1/19/2024    04:19   CBC   WBC 5.76      RBC 3.84      Hemoglobin 12.3  9.6  9.2    Hematocrit 38.0  30.0  28.9    MCV 99.0      MCH 32.0      MCHC 32.4      RDW 13.8      Platelets 268        Lipid Panel          12/21/2023    09:54   Lipid Panel   Total Cholesterol 203    Triglycerides 136    HDL Cholesterol 60    VLDL Cholesterol 24    LDL Cholesterol  119  "   LDL/HDL Ratio 1.93          Data reviewed : Previous cardiology records reviewed, primary care records reviewed        ECG 12 Lead    Date/Time: 6/26/2024 9:31 AM  Performed by: ERIKA Suazo MD    Authorized by: ERIKA Suazo MD  Comparison: compared with previous ECG   Similar to previous ECG  Comparison to previous ECG: No change compared EKG February 2023  Rhythm: sinus rhythm  Conduction: conduction normal  ST Segments: ST segments normal  T Waves: T waves normal  QRS axis: normal  Other findings: non-specific ST-T wave changes    Clinical impression: non-specific ECG                   Assessment and Plan        ASSESSMENT:  Encounter Diagnoses   Name Primary?    Paroxysmal atrial fibrillation Yes    Hypertension, essential          PLAN:    1.  Paroxysmal atrial fibrillation-the patient is clinically maintaining sinus rhythm the majority of the time.  Her nighttime palpitations are self-limited and at this time we will take a watchful waiting approach.  If they become more frequent or more lengthy we can consider surface monitoring.  Continue combination beta-blocker/calcium channel blocker and Eliquis for stroke prevention.  2.  Essential hypertension-controlled, continue current medical therapy  3.  Follow-up will be arranged annually unless problems arise          Patient was given instructions and counseling regarding her condition or for health maintenance advice. Please see specific information pulled into the AVS if appropriate.             ERIKA Suazo MD  6/26/2024    09:30 EDT

## 2024-06-27 NOTE — TELEPHONE ENCOUNTER
Caller: Ely Dee    Relationship: Self    Best call back number: [phone]     Requested Prescriptions:   Requested Prescriptions     Pending Prescriptions Disp Refills    metFORMIN (GLUCOPHAGE) 500 MG tablet       Sig: Take 1 tablet by mouth Daily With Breakfast.        [facility] - [phone]  - [phone]     Last office visit with prescribing clinician: 6/19/2024   Last telemedicine visit with prescribing clinician: Visit date not found   Next office visit with prescribing clinician: 9/23/2024     Does the patient have less than a 3 day supply:  [x] Yes  [] No    Would you like a call back once the refill request has been completed: [] Yes [x] No    If the office needs to give you a call back, can they leave a voicemail: [] Yes [x] No    Pasquale Gomez Rep   06/27/24 09:11 EDT

## 2024-07-02 ENCOUNTER — TELEPHONE (OUTPATIENT)
Dept: GASTROENTEROLOGY | Facility: CLINIC | Age: 79
End: 2024-07-02
Payer: MEDICARE

## 2024-07-02 NOTE — TELEPHONE ENCOUNTER
7/2/2024    Dear Dr Suazo,     Patient: Ely Coats   YOB: 1945        This patient is scheduled to have a Colonoscopy which I will perform at [facility] on 7.17.24.     Our records indicate this patient is currently taking ELIQUIS. This procedure requires the patient to suspend their anticoagulant medication prior to surgery.     Please respond to this request noting your recommendations. You may contact our office at [phone] Option 1 with any questions. I appreciate your prompt response in this matter.     Please return this form to our office no later than two weeks prior to the procedure date listed above. Please return form to [phone]. Please inform our office if the patient requires additional follow-up from your office prior to scheduled procedure date.     ____ I approve my patient to stop taking their ELIQUIS medication 2 days prior to the scheduled procedure.    ____ I do NOT approve my patient to stop taking their ELIQUIS medication at this time.      Please specify clearance expiration date:_____________________________    Approving physician name (please print):     _____________________________________________    Approving physician signature:     ________________________________    Date:________________        Sincerely,  [facility]   Gastroenterology - Dr.Kevin Nuno

## 2024-07-02 NOTE — TELEPHONE ENCOUNTER
Procedure: Colonoscopy and/or EGD     Med Directive: Eliquis     PMH: PAF, HTN     Last Seen: 6/26/24

## 2024-07-11 NOTE — PRE-PROCEDURE INSTRUCTIONS
"Instructed on date and arrival time of 0730. Come to entrance \"C\". Must have  over age 18 to drive home.  May have two visitors; however, children under 12 must stay in waiting room.  Discussed clear liquid diet (no red or purple), bowel prep, and NPO.  May take medications as usual except for blood thinners, diabetic medications, and weight loss medications.  Verbalized understanding of instructions given.  Instructed to call for questions or concerns.  Hold Eliquis for 2-3 days prior to procedure.  "

## 2024-07-16 ENCOUNTER — ANESTHESIA EVENT (OUTPATIENT)
Dept: GASTROENTEROLOGY | Facility: HOSPITAL | Age: 79
End: 2024-07-16
Payer: MEDICARE

## 2024-07-16 NOTE — ANESTHESIA PREPROCEDURE EVALUATION
Anesthesia Evaluation     Patient summary reviewed   NPO Solid Status: > 8 hours  NPO Liquid Status: > 4 hours           Airway   Mallampati: II  TM distance: >3 FB  Neck ROM: full  No difficulty expected  Dental          Pulmonary - negative pulmonary ROS and normal exam    breath sounds clear to auscultation  Cardiovascular - normal exam  Exercise tolerance: good (4-7 METS)    PT is on anticoagulation therapy  Patient on routine beta blocker  Rhythm: regular  Rate: normal    (+) hypertension, dysrhythmias Paroxysmal Atrial Fib, hyperlipidemia      Neuro/Psych- negative ROS  GI/Hepatic/Renal/Endo    (+) renal disease- CRI, diabetes mellitus type 2 well controlled    Musculoskeletal (-) negative ROS    Abdominal  - normal exam   Substance History      OB/GYN negative ob/gyn ROS         Other   arthritis,   history of cancer remission    ROS/Med Hx Other: Eliquis last dose - Saturday (5 days)    Hx renal insufficiency d/t NSAIDs resolved  Skin cancer removed    Cardiac clearance 6/26/2024 Dr Suazo:  1.  Paroxysmal atrial fibrillation-the patient is clinically maintaining sinus rhythm the majority of the time.  Her nighttime palpitations are self-limited and at this time we will take a watchful waiting approach.  If they become more frequent or more lengthy we can consider surface monitoring.  Continue combination beta-blocker/calcium channel blocker and Eliquis for stroke prevention.  2.  Essential hypertension-controlled, continue current medical therapy  3.  Follow-up will be arranged annually unless problems arise      EKG, 6/26/2024: SR (54), borderline LAD, abnormal R-wave progression late transition    ECHO 7/2020  CONCLUSIONS:    1. Normal left ventricular systolic function with estimated ejection fraction        of 55%.  No regional wall motion abnormalities were visualized.    2. No hemodynamically significant valvular heart disease.                            Anesthesia Plan    ASA 3     general   total  IV anesthesia  (Total IV Anesthesia    Patient understands anesthesia not responsible for dental damage.  )  intravenous induction     Anesthetic plan, risks, benefits, and alternatives have been provided, discussed and informed consent has been obtained with: patient and child.  Pre-procedure education provided  Plan discussed with CRNA.      CODE STATUS:

## 2024-07-17 ENCOUNTER — ANESTHESIA (OUTPATIENT)
Dept: GASTROENTEROLOGY | Facility: HOSPITAL | Age: 79
End: 2024-07-17
Payer: MEDICARE

## 2024-07-17 ENCOUNTER — HOSPITAL ENCOUNTER (OUTPATIENT)
Facility: HOSPITAL | Age: 79
Setting detail: HOSPITAL OUTPATIENT SURGERY
Discharge: HOME | End: 2024-07-17
Attending: INTERNAL MEDICINE | Admitting: INTERNAL MEDICINE
Payer: MEDICARE

## 2024-07-17 VITALS
BODY MASS INDEX: 26.2 KG/M2 | DIASTOLIC BLOOD PRESSURE: 109 MMHG | RESPIRATION RATE: 14 BRPM | HEART RATE: 61 BPM | SYSTOLIC BLOOD PRESSURE: 145 MMHG | OXYGEN SATURATION: 96 % | HEIGHT: 64 IN | TEMPERATURE: 96.9 F | WEIGHT: 153.44 LBS

## 2024-07-17 LAB — GLUCOSE BLDC GLUCOMTR-MCNC: 79 MG/DL (ref 70–99)

## 2024-07-17 PROCEDURE — G0105 COLORECTAL SCRN; HI RISK IND: HCPCS | Performed by: INTERNAL MEDICINE

## 2024-07-17 PROCEDURE — 25810000003 LACTATED RINGERS PER 1000 ML: Performed by: NURSE ANESTHETIST, CERTIFIED REGISTERED

## 2024-07-17 PROCEDURE — 82948 REAGENT STRIP/BLOOD GLUCOSE: CPT | Performed by: NURSE ANESTHETIST, CERTIFIED REGISTERED

## 2024-07-17 PROCEDURE — 25810000003 LACTATED RINGERS PER 1000 ML

## 2024-07-17 PROCEDURE — 25010000002 PROPOFOL 10 MG/ML EMULSION

## 2024-07-17 RX ORDER — LIDOCAINE HYDROCHLORIDE 20 MG/ML
INJECTION, SOLUTION EPIDURAL; INFILTRATION; INTRACAUDAL; PERINEURAL AS NEEDED
Status: DISCONTINUED | OUTPATIENT
Start: 2024-07-17 | End: 2024-07-17 | Stop reason: SURG

## 2024-07-17 RX ORDER — SODIUM CHLORIDE, SODIUM LACTATE, POTASSIUM CHLORIDE, CALCIUM CHLORIDE 600; 310; 30; 20 MG/100ML; MG/100ML; MG/100ML; MG/100ML
30 INJECTION, SOLUTION INTRAVENOUS CONTINUOUS
Status: DISCONTINUED | OUTPATIENT
Start: 2024-07-17 | End: 2024-07-17 | Stop reason: HOSPADM

## 2024-07-17 RX ORDER — PROPOFOL 10 MG/ML
VIAL (ML) INTRAVENOUS AS NEEDED
Status: DISCONTINUED | OUTPATIENT
Start: 2024-07-17 | End: 2024-07-17 | Stop reason: SURG

## 2024-07-17 RX ORDER — SODIUM CHLORIDE, SODIUM LACTATE, POTASSIUM CHLORIDE, CALCIUM CHLORIDE 600; 310; 30; 20 MG/100ML; MG/100ML; MG/100ML; MG/100ML
INJECTION, SOLUTION INTRAVENOUS CONTINUOUS PRN
Status: DISCONTINUED | OUTPATIENT
Start: 2024-07-17 | End: 2024-07-17 | Stop reason: SURG

## 2024-07-17 RX ADMIN — LIDOCAINE HYDROCHLORIDE 60 MG: 20 INJECTION, SOLUTION EPIDURAL; INFILTRATION; INTRACAUDAL; PERINEURAL at 09:37

## 2024-07-17 RX ADMIN — SODIUM CHLORIDE, POTASSIUM CHLORIDE, SODIUM LACTATE AND CALCIUM CHLORIDE 30 ML/HR: 600; 310; 30; 20 INJECTION, SOLUTION INTRAVENOUS at 08:35

## 2024-07-17 RX ADMIN — PROPOFOL 125 MCG/KG/MIN: 10 INJECTION, EMULSION INTRAVENOUS at 09:38

## 2024-07-17 RX ADMIN — SODIUM CHLORIDE, POTASSIUM CHLORIDE, SODIUM LACTATE AND CALCIUM CHLORIDE: 600; 310; 30; 20 INJECTION, SOLUTION INTRAVENOUS at 09:33

## 2024-07-17 RX ADMIN — PROPOFOL 50 MG: 10 INJECTION, EMULSION INTRAVENOUS at 09:37

## 2024-07-17 NOTE — H&P
ScreeningPre Procedure History & Physical    Chief Complaint:   Screening     Subjective     HPI:   Screening     Past Medical History:   Past Medical History:   Diagnosis Date    Atrial fibrillation with RVR     FOLLOWS W/LINK, NO RECENT ISSUES, NO CP    Calculus of gallbladder with acute on chronic cholecystitis with obstruction 05/09/2022    Cancer     SKIN CANCER REMOVED    Colon polyp     Contusion of face     Diabetes mellitus     BS IN  THE A.M. 120 ON AVERAGE    Essential hypertension     CONT W/MEDS    Fall     Gallstones     Hyperlipidemia     Kidney stone     Osteoarthritis     Osteopenia     Renal insufficiency     due to nsaids (8/2017 states labs wnl) NO CURRENT PROBLEMS    Snoring     NO CPAP    Wound infection after surgery 05/09/2022       Past Surgical History:  Past Surgical History:   Procedure Laterality Date    BLEPHAROPLASTY Bilateral     CARDIAC CATHETERIZATION Left 2011    [facility], Dr. Cooper Ramires, NO INTERVENTION    CHOLECYSTECTOMY N/A 5/4/2022    Procedure: CHOLECYSTECTOMY LAPAROSCOPIC;  Surgeon: Josue Jernigan MD;  Location: [facility] MAIN OR;  Service: General;  Laterality: N/A;    COLONOSCOPY  2014    negative    COLONOSCOPY  2011    polyp-repeat 2014    CYSTOSCOPY  09/2016    negative- Dr. Murphy per patient urethral polyp noted    ERCP N/A 04/20/2022    Procedure: ENDOSCOPIC RETROGRADE CHOLANGIOPANCREATOGRAPHY WITH SPHINCTEROTOMY, BALLOON SWEEP, COMMON BILE DUCT STENT PLACED, PANCREATIC DUCT STENT PLACED;  Surgeon: Gurvinder Perry MD;  Location: [facility] ENDOSCOPY;  Service: Gastroenterology;  Laterality: N/A;  BILE DUCT STONES    ERCP N/A 5/10/2022    Procedure: ENDOSCOPIC RETROGRADE CHOLANGIOPANCREATOGRAPHY WITH STENT REMOVAL X2, SPHINCTEROTOMY, 12-15MM BALLOON SWEEP WITH BILE DUCT STONE REMOVAL;  Surgeon: Gurvinder Perry MD;  Location: [facility] ENDOSCOPY;  Service: Gastroenterology;  Laterality: N/A;  BILE DUCT STONES    SKIN CANCER EXCISION  04/2016     basal cell on back    TOTAL KNEE ARTHROPLASTY Right 1/17/2024    Procedure: RIGHT TOTAL KNEE ARTHROPLASTY WITH CLARISSA ROBOT;  Surgeon: Geraldo Gnan MD;  Location: [facility] MAIN OR;  Service: Robotics - Ortho;  Laterality: Right;    TUBAL ABDOMINAL LIGATION      WRIST FRACTURE SURGERY Left     ORIF       Family History:  Family History   Problem Relation Age of Onset    Heart disease Mother         afib    Lung disease Mother         interstitial lung disease    Stroke Father         hemmorhagic stroke-aneurysm    Diabetes Brother     Malig Hyperthermia Neg Hx     Colon cancer Neg Hx        Social History:   reports that she has never smoked. She has been exposed to tobacco smoke. She has never used smokeless tobacco. She reports that she does not drink alcohol and does not use drugs.    Medications:   Medications Prior to Admission   Medication Sig Dispense Refill Last Dose    albuterol sulfate  (90 Base) MCG/ACT inhaler Inhale 2 puffs Every 4 (Four) Hours As Needed for Wheezing or Shortness of Air. 18 g 3 Past Month    carvedilol (COREG) 6.25 MG tablet Take 1 tablet by mouth 2 (Two) Times a Day With Meals.   7/16/2024    cyanocobalamin (VITAMIN B-12) 1000 MCG tablet Take 0.5 tablets by mouth Daily.   7/16/2024    dilTIAZem XR (DILACOR XR) 240 MG 24 hr capsule Take 1 capsule by mouth Daily.   7/16/2024    ferrous sulfate (FerrouSul) 325 (65 FE) MG tablet Take 1 tablet by mouth Daily With Breakfast. 90 tablet 0 7/16/2024    lisinopril (PRINIVIL,ZESTRIL) 10 MG tablet Take 1 tablet by mouth Daily. 90 tablet 2 Past Week    MAGNESIUM PO Take 1 tablet by mouth Daily.   Past Month    metFORMIN (GLUCOPHAGE) 500 MG tablet Take 1 tablet by mouth Daily With Breakfast. 90 tablet 1 7/16/2024    Accu-Chek Guide test strip USE 1 STRIP TWICE DAILY       Eliquis 5 MG tablet tablet TAKE 1 TABLET BY MOUTH TWICE DAILY 60 tablet 2 7/13/2024    furosemide (LASIX) 20 MG tablet Take 1 tablet by mouth Daily. (Patient not taking:  "Reported on 5/15/2024)       Multiple Vitamin (MULTIVITAMIN) tablet Take 1 tablet by mouth Daily.       polyethylene glycol (MIRALAX) 17 GM/SCOOP powder Take 17 g by mouth 2 (Two) Times a Day As Needed (constipation). 238 g 0        Allergies:  Penicillins and Statins        Objective     Blood pressure 151/74, pulse 62, temperature 97 °F (36.1 °C), temperature source Temporal, resp. rate 20, height 162.6 cm (64\"), weight 69.6 kg (153 lb 7 oz), SpO2 96%.    Physical Exam   Constitutional: Pt is oriented to person, place, and time and well-developed, well-nourished, and in no distress.   Mouth/Throat: Oropharynx is clear and moist.   Neck: Normal range of motion.   Cardiovascular: Normal rate, regular rhythm and normal heart sounds.    Pulmonary/Chest: Effort normal and breath sounds normal.   Abdominal: Soft. Nontender  Skin: Skin is warm and dry.   Psychiatric: Mood, memory, affect and judgment normal.     Assessment & Plan     Diagnosis:  Screening colonoscopy  H/o colon polyps     Anticipated Surgical Procedure:  colonoscopy    The risks, benefits, and alternatives of this procedure have been discussed with the patient or the responsible party- the patient understands and agrees to proceed.            "

## 2024-07-17 NOTE — ANESTHESIA POSTPROCEDURE EVALUATION
Patient: Ely Coats    Procedure Summary       Date: 07/17/24 Room / Location: [facility] ENDOSCOPY 2 / [facility] ENDOSCOPY    Anesthesia Start: 0933 Anesthesia Stop: 1001    Procedure: COLONOSCOPY Diagnosis:       Encounter for screening colonoscopy      History of colon polyps      (Encounter for screening colonoscopy [Z12.11])      (History of colon polyps [Z86.010])    Surgeons: Simba Nuno MD Provider: Adela Rashid CRNA    Anesthesia Type: general ASA Status: 3            Anesthesia Type: general    Vitals  Vitals Value Taken Time   /109 07/17/24 1016   Temp 36.1 °C (96.9 °F) 07/17/24 1000   Pulse 60 07/17/24 1020   Resp 14 07/17/24 1015   SpO2 97 % 07/17/24 1020   Vitals shown include unfiled device data.        Post Anesthesia Care and Evaluation    Post-procedure mental status: acceptable.  Pain management: satisfactory to patient    Airway patency: patent  Anesthetic complications: No anesthetic complications    Cardiovascular status: acceptable  Respiratory status: acceptable and room air    Comments: Per chart review

## 2024-07-25 RX ORDER — FERROUS SULFATE 325(65) MG
1 TABLET ORAL
Qty: 90 TABLET | Refills: 0 | Status: SHIPPED | OUTPATIENT
Start: 2024-07-25

## 2024-08-18 ENCOUNTER — HOSPITAL ENCOUNTER (EMERGENCY)
Facility: HOSPITAL | Age: 79
Discharge: HOME | End: 2024-08-18
Attending: EMERGENCY MEDICINE | Admitting: EMERGENCY MEDICINE
Payer: MEDICARE

## 2024-08-18 ENCOUNTER — APPOINTMENT (OUTPATIENT)
Dept: CT IMAGING | Facility: HOSPITAL | Age: 79
End: 2024-08-18
Payer: MEDICARE

## 2024-08-18 VITALS
SYSTOLIC BLOOD PRESSURE: 134 MMHG | WEIGHT: 154.1 LBS | BODY MASS INDEX: 26.31 KG/M2 | DIASTOLIC BLOOD PRESSURE: 67 MMHG | HEART RATE: 53 BPM | OXYGEN SATURATION: 95 % | TEMPERATURE: 98.2 F | RESPIRATION RATE: 16 BRPM | HEIGHT: 64 IN

## 2024-08-18 DIAGNOSIS — R10.9 RIGHT FLANK PAIN: Primary | ICD-10-CM

## 2024-08-18 LAB
ALBUMIN SERPL-MCNC: 3.9 G/DL (ref 3.5–5.2)
ALBUMIN/GLOB SERPL: 1.4 G/DL
ALP SERPL-CCNC: 66 U/L (ref 39–117)
ALT SERPL W P-5'-P-CCNC: 9 U/L (ref 1–33)
ANION GAP SERPL CALCULATED.3IONS-SCNC: 9.6 MMOL/L (ref 5–15)
AST SERPL-CCNC: 15 U/L (ref 1–32)
BACTERIA UR QL AUTO: ABNORMAL /HPF
BASOPHILS # BLD AUTO: 0.03 10*3/MM3 (ref 0–0.2)
BASOPHILS NFR BLD AUTO: 0.5 % (ref 0–1.5)
BILIRUB SERPL-MCNC: 0.4 MG/DL (ref 0–1.2)
BILIRUB UR QL STRIP: ABNORMAL
BUN SERPL-MCNC: 19 MG/DL (ref 8–23)
BUN/CREAT SERPL: 24.4 (ref 7–25)
CALCIUM SPEC-SCNC: 9.1 MG/DL (ref 8.6–10.5)
CHLORIDE SERPL-SCNC: 102 MMOL/L (ref 98–107)
CLARITY UR: CLEAR
CO2 SERPL-SCNC: 27.4 MMOL/L (ref 22–29)
COD CRY URNS QL: ABNORMAL /HPF
COLOR UR: ABNORMAL
CREAT SERPL-MCNC: 0.78 MG/DL (ref 0.57–1)
D-LACTATE SERPL-SCNC: 1 MMOL/L (ref 0.5–2)
DEPRECATED RDW RBC AUTO: 49.7 FL (ref 37–54)
EGFRCR SERPLBLD CKD-EPI 2021: 76.4 ML/MIN/1.73
EOSINOPHIL # BLD AUTO: 0.06 10*3/MM3 (ref 0–0.4)
EOSINOPHIL NFR BLD AUTO: 1 % (ref 0.3–6.2)
ERYTHROCYTE [DISTWIDTH] IN BLOOD BY AUTOMATED COUNT: 13.9 % (ref 12.3–15.4)
GLOBULIN UR ELPH-MCNC: 2.7 GM/DL
GLUCOSE SERPL-MCNC: 134 MG/DL (ref 65–99)
GLUCOSE UR STRIP-MCNC: NEGATIVE MG/DL
HCT VFR BLD AUTO: 38 % (ref 34–46.6)
HGB BLD-MCNC: 12.8 G/DL (ref 12–15.9)
HGB UR QL STRIP.AUTO: ABNORMAL
HOLD SPECIMEN: NORMAL
HOLD SPECIMEN: NORMAL
HYALINE CASTS UR QL AUTO: ABNORMAL /LPF
IMM GRANULOCYTES # BLD AUTO: 0.02 10*3/MM3 (ref 0–0.05)
IMM GRANULOCYTES NFR BLD AUTO: 0.3 % (ref 0–0.5)
KETONES UR QL STRIP: ABNORMAL
LEUKOCYTE ESTERASE UR QL STRIP.AUTO: ABNORMAL
LIPASE SERPL-CCNC: 26 U/L (ref 13–60)
LYMPHOCYTES # BLD AUTO: 0.96 10*3/MM3 (ref 0.7–3.1)
LYMPHOCYTES NFR BLD AUTO: 15.5 % (ref 19.6–45.3)
MCH RBC QN AUTO: 32.3 PG (ref 26.6–33)
MCHC RBC AUTO-ENTMCNC: 33.7 G/DL (ref 31.5–35.7)
MCV RBC AUTO: 96 FL (ref 79–97)
MONOCYTES # BLD AUTO: 0.5 10*3/MM3 (ref 0.1–0.9)
MONOCYTES NFR BLD AUTO: 8.1 % (ref 5–12)
MUCOUS THREADS URNS QL MICRO: ABNORMAL /HPF
NEUTROPHILS NFR BLD AUTO: 4.62 10*3/MM3 (ref 1.7–7)
NEUTROPHILS NFR BLD AUTO: 74.6 % (ref 42.7–76)
NITRITE UR QL STRIP: NEGATIVE
NRBC BLD AUTO-RTO: 0 /100 WBC (ref 0–0.2)
PH UR STRIP.AUTO: 5.5 [PH] (ref 5–8)
PLATELET # BLD AUTO: 197 10*3/MM3 (ref 140–450)
PMV BLD AUTO: 9.9 FL (ref 6–12)
POTASSIUM SERPL-SCNC: 4.2 MMOL/L (ref 3.5–5.2)
PROT SERPL-MCNC: 6.6 G/DL (ref 6–8.5)
PROT UR QL STRIP: ABNORMAL
RBC # BLD AUTO: 3.96 10*6/MM3 (ref 3.77–5.28)
RBC # UR STRIP: ABNORMAL /HPF
REF LAB TEST METHOD: ABNORMAL
SODIUM SERPL-SCNC: 139 MMOL/L (ref 136–145)
SP GR UR STRIP: >1.03 (ref 1–1.03)
SQUAMOUS #/AREA URNS HPF: ABNORMAL /HPF
UROBILINOGEN UR QL STRIP: ABNORMAL
WBC # UR STRIP: ABNORMAL /HPF
WBC NRBC COR # BLD AUTO: 6.19 10*3/MM3 (ref 3.4–10.8)
WHOLE BLOOD HOLD COAG: NORMAL
WHOLE BLOOD HOLD SPECIMEN: NORMAL

## 2024-08-18 PROCEDURE — 74176 CT ABD & PELVIS W/O CONTRAST: CPT

## 2024-08-18 PROCEDURE — 99284 EMERGENCY DEPT VISIT MOD MDM: CPT

## 2024-08-18 PROCEDURE — 85025 COMPLETE CBC W/AUTO DIFF WBC: CPT | Performed by: EMERGENCY MEDICINE

## 2024-08-18 PROCEDURE — 83690 ASSAY OF LIPASE: CPT | Performed by: EMERGENCY MEDICINE

## 2024-08-18 PROCEDURE — 36415 COLL VENOUS BLD VENIPUNCTURE: CPT

## 2024-08-18 PROCEDURE — 81001 URINALYSIS AUTO W/SCOPE: CPT | Performed by: EMERGENCY MEDICINE

## 2024-08-18 PROCEDURE — 83605 ASSAY OF LACTIC ACID: CPT | Performed by: EMERGENCY MEDICINE

## 2024-08-18 PROCEDURE — 80053 COMPREHEN METABOLIC PANEL: CPT | Performed by: EMERGENCY MEDICINE

## 2024-08-18 RX ORDER — SODIUM CHLORIDE 0.9 % (FLUSH) 0.9 %
10 SYRINGE (ML) INJECTION AS NEEDED
Status: DISCONTINUED | OUTPATIENT
Start: 2024-08-18 | End: 2024-08-18 | Stop reason: HOSPADM

## 2024-08-18 RX ORDER — HYDROCODONE BITARTRATE AND ACETAMINOPHEN 5; 325 MG/1; MG/1
1 TABLET ORAL EVERY 6 HOURS PRN
Qty: 10 TABLET | Refills: 0 | Status: SHIPPED | OUTPATIENT
Start: 2024-08-18

## 2024-08-18 RX ORDER — VALACYCLOVIR HYDROCHLORIDE 1 G/1
1000 TABLET, FILM COATED ORAL 3 TIMES DAILY
Qty: 21 TABLET | Refills: 0 | Status: SHIPPED | OUTPATIENT
Start: 2024-08-18 | End: 2024-08-25

## 2024-08-18 NOTE — ED PROVIDER NOTES
Time: 7:52 AM EDT  Date of encounter:  8/18/2024  Independent Historian/Clinical History and Information was obtained by:   Patient    History is limited by: N/A    Chief Complaint: Abdominal pain, flank pain      History of Present Illness:  Patient is a 79 y.o. year old female who presents to the emergency department for evaluation of abdominal pain and flank pain.  Patient states that she has been having some abdominal pain and flank pain has been ongoing since Thursday.  States that it hurts mainly on the right side.  Denies any dysuria, hematuria, vaginal bleeding, nausea, vomiting, fever.  States that she does have a history of stones in the past.  Has had a cholecystectomy in the past.  She states that this feels similar to previous kidney stones.  No other complaints this time.      Patient Care Team  Primary Care Provider: Julius Tijerina MD    Past Medical History:     Allergies   Allergen Reactions    Penicillins Rash     Mild rash- pt states can take amoxicillin or keflex      Statins Other (See Comments)     Hair loss crestor     Past Medical History:   Diagnosis Date    Atrial fibrillation with RVR     FOLLOWS W/LINK, NO RECENT ISSUES, NO CP    Calculus of gallbladder with acute on chronic cholecystitis with obstruction 05/09/2022    Cancer     SKIN CANCER REMOVED    Colon polyp     Contusion of face     Diabetes mellitus     BS IN  THE A.M. 120 ON AVERAGE    Essential hypertension     CONT W/MEDS    Fall     Gallstones     Hyperlipidemia     Kidney stone     Osteoarthritis     Osteopenia     Renal insufficiency     due to nsaids (8/2017 states labs wnl) NO CURRENT PROBLEMS    Snoring     NO CPAP    Wound infection after surgery 05/09/2022     Past Surgical History:   Procedure Laterality Date    BLEPHAROPLASTY Bilateral     CARDIAC CATHETERIZATION Left 2011    [facility], Dr. Cooper Ramires, NO INTERVENTION    CHOLECYSTECTOMY N/A 5/4/2022    Procedure: CHOLECYSTECTOMY LAPAROSCOPIC;   Surgeon: Josue Jernigan MD;  Location: [facility] MAIN OR;  Service: General;  Laterality: N/A;    COLONOSCOPY  2014    negative    COLONOSCOPY  2011    polyp-repeat 2014    COLONOSCOPY N/A 7/17/2024    Procedure: COLONOSCOPY;  Surgeon: Simba Nuno MD;  Location: [facility] ENDOSCOPY;  Service: Gastroenterology;  Laterality: N/A;  DIVERTICULOSIS    CYSTOSCOPY  09/2016    negative- Dr. Murphy per patient urethral polyp noted    ERCP N/A 04/20/2022    Procedure: ENDOSCOPIC RETROGRADE CHOLANGIOPANCREATOGRAPHY WITH SPHINCTEROTOMY, BALLOON SWEEP, COMMON BILE DUCT STENT PLACED, PANCREATIC DUCT STENT PLACED;  Surgeon: Gurvinder Perry MD;  Location: [facility] ENDOSCOPY;  Service: Gastroenterology;  Laterality: N/A;  BILE DUCT STONES    ERCP N/A 5/10/2022    Procedure: ENDOSCOPIC RETROGRADE CHOLANGIOPANCREATOGRAPHY WITH STENT REMOVAL X2, SPHINCTEROTOMY, 12-15MM BALLOON SWEEP WITH BILE DUCT STONE REMOVAL;  Surgeon: Gurvinder Perry MD;  Location: [facility] ENDOSCOPY;  Service: Gastroenterology;  Laterality: N/A;  BILE DUCT STONES    SKIN CANCER EXCISION  04/2016    basal cell on back    TOTAL KNEE ARTHROPLASTY Right 1/17/2024    Procedure: RIGHT TOTAL KNEE ARTHROPLASTY WITH CLARISSA ROBOT;  Surgeon: Geraldo Gann MD;  Location: [facility] MAIN OR;  Service: Robotics - Ortho;  Laterality: Right;    TUBAL ABDOMINAL LIGATION      WRIST FRACTURE SURGERY Left     ORIF     Family History   Problem Relation Age of Onset    Heart disease Mother         afib    Lung disease Mother         interstitial lung disease    Stroke Father         hemmorhagic stroke-aneurysm    Diabetes Brother     Malig Hyperthermia Neg Hx     Colon cancer Neg Hx        Home Medications:  Prior to Admission medications    Medication Sig Start Date End Date Taking? Authorizing Provider   Accu-Chek Guide test strip USE 1 STRIP TWICE DAILY    Provider, MD Zuleyma   albuterol sulfate  (90 Base) MCG/ACT inhaler Inhale 2 puffs Every 4 (Four)  "Hours As Needed for Wheezing or Shortness of Air. 6/20/24   Julius Tijerina MD   carvedilol (COREG) 6.25 MG tablet Take 1 tablet by mouth 2 (Two) Times a Day With Meals. 7/18/22   Zuleyma Hennessy MD   cyanocobalamin (VITAMIN B-12) 1000 MCG tablet Take 0.5 tablets by mouth Daily.    Zuleyma Hennessy MD   dilTIAZem XR (DILACOR XR) 240 MG 24 hr capsule Take 1 capsule by mouth Daily.    Zuleyma Hennessy MD   Eliquis 5 MG tablet tablet TAKE 1 TABLET BY MOUTH TWICE DAILY 5/28/24   Julius Tijerina MD   ferrous sulfate (FeroSul) 325 (65 FE) MG tablet TAKE 1 TABLET BY MOUTH DAILY WITH BREAKFAST. 7/25/24   Julius Tijerina MD   furosemide (LASIX) 20 MG tablet Take 1 tablet by mouth Daily.  Patient not taking: Reported on 5/15/2024    Zuleyma Hennessy MD   lisinopril (PRINIVIL,ZESTRIL) 10 MG tablet Take 1 tablet by mouth Daily. 3/21/24   Julius Tijerina MD   MAGNESIUM PO Take 1 tablet by mouth Daily.    Zuleyma Hennessy MD   metFORMIN (GLUCOPHAGE) 500 MG tablet Take 1 tablet by mouth Daily With Breakfast. 6/27/24   Julius Tijerina MD   Multiple Vitamin (MULTIVITAMIN) tablet Take 1 tablet by mouth Daily.    Zuleyma Hennessy MD   polyethylene glycol (MIRALAX) 17 GM/SCOOP powder Take 17 g by mouth 2 (Two) Times a Day As Needed (constipation). 1/19/24   Mg Mcdaniel MD        Social History:   Social History     Tobacco Use    Smoking status: Never     Passive exposure: Past    Smokeless tobacco: Never   Vaping Use    Vaping status: Never Used   Substance Use Topics    Alcohol use: Never    Drug use: Never         Review of Systems:  Review of Systems   Gastrointestinal:  Positive for abdominal pain.   Genitourinary:  Positive for flank pain.        Physical Exam:  /67 (BP Location: Left arm, Patient Position: Lying)   Pulse 53   Temp 98.2 °F (36.8 °C) (Oral)   Resp 16   Ht 162.6 cm (64\")   Wt 69.9 kg (154 lb 1.6 oz)   SpO2 95%   BMI 26.45 kg/m²     Physical Exam  Vitals and " nursing note reviewed.   Constitutional:       Appearance: Normal appearance.   HENT:      Head: Normocephalic and atraumatic.   Eyes:      General: No scleral icterus.  Cardiovascular:      Rate and Rhythm: Normal rate and regular rhythm.      Heart sounds: Normal heart sounds.   Pulmonary:      Effort: Pulmonary effort is normal.      Breath sounds: Normal breath sounds.   Abdominal:      Palpations: Abdomen is soft.      Tenderness: There is abdominal tenderness.      Comments: Right lower quadrant abdominal pain and right flank pain.   Musculoskeletal:         General: Normal range of motion.      Cervical back: Normal range of motion.   Skin:     Findings: No rash.   Neurological:      General: No focal deficit present.      Mental Status: She is alert.                  Procedures:  Procedures      Medical Decision Making:      Comorbidities that affect care:    Atrial Fibrillation, Diabetes, Hypertension    External Notes reviewed:    Reviewed office visit from 6/26/2024      The following orders were placed and all results were independently analyzed by me:  Orders Placed This Encounter   Procedures    CT Abdomen Pelvis Without Contrast    [place] Draw    Comprehensive Metabolic Panel    Lipase    Urinalysis With Microscopic If Indicated (No Culture) - Urine, Clean Catch    Lactic Acid, Plasma    CBC Auto Differential    Urinalysis, Microscopic Only - Urine, Clean Catch    NPO Diet NPO Type: Strict NPO    Undress & Gown    Insert Peripheral IV    CBC & Differential    Green Top (Gel)    Lavender Top    Gold Top - SST    Light Blue Top       Medications Given in the Emergency Department:  Medications   sodium chloride 0.9 % flush 10 mL (has no administration in time range)        ED Course:         Labs:    Lab Results (last 24 hours)       Procedure Component Value Units Date/Time    CBC & Differential [[id number]]  (Abnormal) Collected: 08/18/24 7269    Specimen: Blood Updated: 08/18/24 3668    Narrative:       The following orders were created for panel order CBC & Differential.  Procedure                               Abnormality         Status                     ---------                               -----------         ------                     CBC Auto Differential[[id number]]        Abnormal            Final result                 Please view results for these tests on the individual orders.    Comprehensive Metabolic Panel [[id number]]  (Abnormal) Collected: 08/18/24 0740    Specimen: Blood Updated: 08/18/24 0807     Glucose 134 mg/dL      BUN 19 mg/dL      Creatinine 0.78 mg/dL      Sodium 139 mmol/L      Potassium 4.2 mmol/L      Chloride 102 mmol/L      CO2 27.4 mmol/L      Calcium 9.1 mg/dL      Total Protein 6.6 g/dL      Albumin 3.9 g/dL      ALT (SGPT) 9 U/L      AST (SGOT) 15 U/L      Alkaline Phosphatase 66 U/L      Total Bilirubin 0.4 mg/dL      Globulin 2.7 gm/dL      A/G Ratio 1.4 g/dL      BUN/Creatinine Ratio 24.4     Anion Gap 9.6 mmol/L      eGFR 76.4 mL/min/1.73     Narrative:      GFR Normal >60  Chronic Kidney Disease <60  Kidney Failure <15    The GFR formula is only valid for adults with stable renal function between ages 18 and 70.    Lipase [[id number]]  (Normal) Collected: 08/18/24 0740    Specimen: Blood Updated: 08/18/24 0807     Lipase 26 U/L     Lactic Acid, Plasma [[id number]]  (Normal) Collected: 08/18/24 0740    Specimen: Blood Updated: 08/18/24 0803     Lactate 1.0 mmol/L     CBC Auto Differential [[id number]]  (Abnormal) Collected: 08/18/24 0740    Specimen: Blood Updated: 08/18/24 0751     WBC 6.19 10*3/mm3      RBC 3.96 10*6/mm3      Hemoglobin 12.8 g/dL      Hematocrit 38.0 %      MCV 96.0 fL      MCH 32.3 pg      MCHC 33.7 g/dL      RDW 13.9 %      RDW-SD 49.7 fl      MPV 9.9 fL      Platelets 197 10*3/mm3      Neutrophil % 74.6 %      Lymphocyte % 15.5 %      Monocyte % 8.1 %      Eosinophil % 1.0 %      Basophil % 0.5 %      Immature Grans % 0.3 %      Neutrophils, Absolute  4.62 10*3/mm3      Lymphocytes, Absolute 0.96 10*3/mm3      Monocytes, Absolute 0.50 10*3/mm3      Eosinophils, Absolute 0.06 10*3/mm3      Basophils, Absolute 0.03 10*3/mm3      Immature Grans, Absolute 0.02 10*3/mm3      nRBC 0.0 /100 WBC     Urinalysis With Microscopic If Indicated (No Culture) - Urine, Clean Catch [[id number]]  (Abnormal) Collected: 08/18/24 0750    Specimen: Urine, Clean Catch Updated: 08/18/24 0811     Color, UA Dark Yellow     Appearance, UA Clear     pH, UA 5.5     Specific Gravity, UA >1.030     Glucose, UA Negative     Ketones, UA Trace     Bilirubin, UA Small (1+)     Blood, UA Small (1+)     Protein, UA Trace     Leuk Esterase, UA Small (1+)     Nitrite, UA Negative     Urobilinogen, UA 1.0 E.U./dL    Urinalysis, Microscopic Only - Urine, Clean Catch [[id number]]  (Abnormal) Collected: 08/18/24 0750    Specimen: Urine, Clean Catch Updated: 08/18/24 0811     RBC, UA 3-5 /HPF      WBC, UA 6-10 /HPF      Bacteria, UA 2+ /HPF      Squamous Epithelial Cells, UA 3-6 /HPF      Hyaline Casts, UA 3-6 /LPF      Calcium Oxalate Crystals, UA Moderate/2+ /HPF      Mucus, UA Small/1+ /HPF      Methodology Manual Light Microscopy             Imaging:    CT Abdomen Pelvis Without Contrast    Result Date: 8/18/2024  CT ABDOMEN PELVIS WO CONTRAST Date of Exam: 8/18/2024 8:41 AM EDT Indication: right sided abdominal pain. Comparison: CT abdomen/pelvis 4/19/2022 Technique: Axial CT images were obtained of the abdomen and pelvis without the administration of contrast. Reconstructed coronal and sagittal images were also obtained. Automated exposure control and iterative construction methods were used. Findings: The lung bases are clear. A small hiatal hernia is evident. The liver is of normal size. The gallbladder is absent, surgical clips are seen in the gallbladder bed. No pancreatic or adrenal mass is evident. The spleen is of normal size. No renal or ureteral stones are seen. There is no evidence of  hydronephrosis. The urinary bladder is not abnormally distended. The uterus measures 7 cm in greatest transverse dimension. No pelvic mass is seen. The stomach is not abnormally distended. Bowel loops are of normal caliber. Pan colonic diverticulosis is evident. The appendix is normal. 2 cm left paramedian ventral hernia defect is seen in the epigastric region on series 201 image 55, not evident on the prior study. Abdominal fat herniating through the defect measures 3 cm. Degenerative changes are seen in the lower thoracic and lumbar spine.     Impression: CT scan of the abdomen and pelvis without contrast demonstrating previous cholecystectomy. Pancolonic diverticulosis without diverticulitis. Electronically Signed: Kapil Long MD  8/18/2024 9:14 AM EDT  Workstation ID: [id number]       Differential Diagnosis and Discussion:    Abdominal Pain: Based on the patient's signs and symptoms, I considered abdominal aortic aneurysm, small bowel obstruction, pancreatitis, acute cholecystitis, acute appendecitis, peptic ulcer disease, gastritis, colitis, endocrine disorders, irritable bowel syndrome and other differential diagnosis an etiology of the patient's abdominal pain.  Flank Pain: Differential diagnosis includes but is not limited to kidney stones, pyelonephritis, musculoskeletal disorders, renal infarction, urinary tract infection, hydronephrosis, radiculopathy, aortic aneurysm, renal cell carcinoma.    All labs were reviewed and interpreted by me.  CT scan radiology impression was interpreted by me.    MDM     Patient is a 79-year-old female who presents with complaints of right flank pain.  Reports has been ongoing for several days.  Thought she initially could possibly have a kidney stone.  Workup here does not show any acute findings.  No white count and no signs of intra-abdominal issues.  She reports the pain is mainly in a bandlike on the right.  Could be shingles related but there is no rash currently.   Will give Valtrex as an outpatient as well as cover with some pain medicine.  Discussed if she has new or worsening symptoms return to the emergency room.  I did discuss that if she starts to notice a rash she needs to start taking the Valtrex.  Strong return precautions given.          Patient Care Considerations:          Consultants/Shared Management Plan:    None    Social Determinants of Health:    Patient is independent, reliable, and has access to care.       Disposition and Care Coordination:    Discharged: The patient is suitable and stable for discharge with no need for consideration of admission.    I have explained the patient´s condition, diagnoses and treatment plan based on the information available to me at this time. I have answered questions and addressed any concerns. The patient has a good  understanding of the patient´s diagnosis, condition, and treatment plan as can be expected at this point. The vital signs have been stable. The patient´s condition is stable and appropriate for discharge from the emergency department.      The patient will pursue further outpatient evaluation with the primary care physician or other designated or consulting physician as outlined in the discharge instructions. They are agreeable to this plan of care and follow-up instructions have been explained in detail. The patient has received these instructions in written format and have expressed an understanding of the discharge instructions. The patient is aware that any significant change in condition or worsening of symptoms should prompt an immediate return to this or the closest emergency department or call to 911.      Final diagnoses:   Right flank pain        ED Disposition       ED Disposition   Discharge    Condition   Stable    Comment   --               This medical record created using voice recognition software.             Karl Pinto MD  08/18/24 4304

## 2024-08-30 RX ORDER — APIXABAN 5 MG/1
5 TABLET, FILM COATED ORAL 2 TIMES DAILY
Qty: 60 TABLET | Refills: 2 | Status: SHIPPED | OUTPATIENT
Start: 2024-08-30

## 2024-09-05 ENCOUNTER — OFFICE VISIT (OUTPATIENT)
Dept: INTERNAL MEDICINE | Facility: CLINIC | Age: 79
End: 2024-09-05
Payer: MEDICARE

## 2024-09-05 VITALS
TEMPERATURE: 97.7 F | OXYGEN SATURATION: 98 % | HEART RATE: 72 BPM | WEIGHT: 153 LBS | BODY MASS INDEX: 26.12 KG/M2 | DIASTOLIC BLOOD PRESSURE: 82 MMHG | SYSTOLIC BLOOD PRESSURE: 152 MMHG | HEIGHT: 64 IN

## 2024-09-05 DIAGNOSIS — I10 ESSENTIAL HYPERTENSION: ICD-10-CM

## 2024-09-05 DIAGNOSIS — B02.9 HERPES ZOSTER WITHOUT COMPLICATION: Primary | ICD-10-CM

## 2024-09-05 PROCEDURE — 3077F SYST BP >= 140 MM HG: CPT | Performed by: STUDENT IN AN ORGANIZED HEALTH CARE EDUCATION/TRAINING PROGRAM

## 2024-09-05 PROCEDURE — 3079F DIAST BP 80-89 MM HG: CPT | Performed by: STUDENT IN AN ORGANIZED HEALTH CARE EDUCATION/TRAINING PROGRAM

## 2024-09-05 PROCEDURE — G2211 COMPLEX E/M VISIT ADD ON: HCPCS | Performed by: STUDENT IN AN ORGANIZED HEALTH CARE EDUCATION/TRAINING PROGRAM

## 2024-09-05 PROCEDURE — 99214 OFFICE O/P EST MOD 30 MIN: CPT | Performed by: STUDENT IN AN ORGANIZED HEALTH CARE EDUCATION/TRAINING PROGRAM

## 2024-09-05 PROCEDURE — 1126F AMNT PAIN NOTED NONE PRSNT: CPT | Performed by: STUDENT IN AN ORGANIZED HEALTH CARE EDUCATION/TRAINING PROGRAM

## 2024-09-05 RX ORDER — PREDNISONE 10 MG/1
TABLET ORAL
Qty: 38 TABLET | Refills: 0 | Status: SHIPPED | OUTPATIENT
Start: 2024-09-05

## 2024-09-05 RX ORDER — LOSARTAN POTASSIUM 25 MG/1
25 TABLET ORAL DAILY
Qty: 90 TABLET | Refills: 2 | Status: SHIPPED | OUTPATIENT
Start: 2024-09-05

## 2024-09-05 NOTE — LETTER
September 5, 2024     Patient: Ely Coats   YOB: 1945   Date of Visit: 9/5/2024       To Whom It May Concern:    It is my medical opinion that Ely Coats may return to work on 09/06/2024 .           Sincerely,        Julius Tijerina MD

## 2024-09-05 NOTE — PROGRESS NOTES
Chief Complaint  Herpes Zoster (Diagnosed at ED three weeks ago. Still in pain)    Subjective          Ely Coats presents to [facility]  History of Present Illness    Seen in ER 8/18/24 with right flank pain.  Had imaging that was reassuring.  Although no rash, thought to be having shingles attack.  Given acyclovir.  Subsequently, Miss Coats did develop rash.  This has improved substantially, although having quite a bit of pain along this dermatone.    Elevated BP noted.  She is no longer taking lisinopril, as it seemed to be causing a cough.    Current Outpatient Medications   Medication Instructions    Accu-Chek Guide test strip USE 1 STRIP TWICE DAILY    albuterol sulfate  (90 Base) MCG/ACT inhaler 2 puffs, Inhalation, Every 4 Hours PRN    carvedilol (COREG) 6.25 MG tablet 1 tablet, Oral, 2 Times Daily With Meals    cyanocobalamin (VITAMIN B-12) 500 mcg, Oral, Daily    dilTIAZem XR (DILACOR XR) 240 mg, Oral, Daily    Eliquis 5 mg, Oral, 2 Times Daily    FeroSul 325 mg, Oral, Daily With Breakfast    furosemide (LASIX) 20 mg, Oral, Daily, Doesn't take every day    HYDROcodone-acetaminophen (NORCO) 5-325 MG per tablet 1 tablet, Oral, Every 6 Hours PRN    losartan (COZAAR) 25 mg, Oral, Daily    MAGNESIUM PO 1 tablet, Daily    metFORMIN (GLUCOPHAGE) 500 mg, Oral, Daily With Breakfast    Multiple Vitamin (MULTIVITAMIN) tablet 1 tablet, Oral, Daily    polyethylene glycol (MIRALAX) 17 g, Oral, 2 Times Daily PRN    predniSONE (DELTASONE) 10 MG tablet Take 40 mg PO (4 tab) daily for days 1-5.  Then, take 30 mg PO (3 tab) daily for 6-8.  Then take 20 mg PO (2 tab) daily for day 9-11.  Then take 10 mg PO (1 tab) for days 12-14.       The following portions of the patient's history were reviewed and updated as appropriate: allergies, current medications, past family history, past medical history, past social history, past surgical history, and problem  "list.    Objective   Vital Signs:   /82   Pulse 72   Temp 97.7 °F (36.5 °C)   Ht 162.6 cm (64\")   Wt 69.4 kg (153 lb)   SpO2 98%   BMI 26.26 kg/m²     BP Readings from Last 3 Encounters:   09/05/24 152/82   08/18/24 134/67   07/17/24 (!) 145/109     Wt Readings from Last 3 Encounters:   09/05/24 69.4 kg (153 lb)   08/18/24 69.9 kg (154 lb 1.6 oz)   07/17/24 69.6 kg (153 lb 7 oz)           Physical Exam  Vitals reviewed.   Constitutional:       General: She is not in acute distress.     Appearance: Normal appearance. She is not ill-appearing, toxic-appearing or diaphoretic.   HENT:      Head: Normocephalic and atraumatic.      Right Ear: External ear normal.      Left Ear: External ear normal.   Eyes:      Conjunctiva/sclera: Conjunctivae normal.   Cardiovascular:      Rate and Rhythm: Normal rate and regular rhythm.      Pulses: Normal pulses.      Heart sounds: Normal heart sounds. No murmur heard.     No friction rub. No gallop.   Pulmonary:      Effort: Pulmonary effort is normal. No respiratory distress.      Breath sounds: Normal breath sounds. No stridor. No wheezing, rhonchi or rales.   Chest:      Chest wall: No tenderness.   Abdominal:      General: Abdomen is flat.      Palpations: Abdomen is soft. There is no mass.      Tenderness: There is no abdominal tenderness.   Musculoskeletal:      Right lower leg: No edema.      Left lower leg: No edema.   Skin:     General: Skin is warm and dry.      Comments: Mildly erythematous patch noted, RLQ.  No vesicles noted   Neurological:      General: No focal deficit present.      Mental Status: She is alert. Mental status is at baseline.   Psychiatric:         Behavior: Behavior normal.         Thought Content: Thought content normal.         Judgment: Judgment normal.          Result Review :   The following data was reviewed by: Julius Tijerina MD on 09/05/2024:  Common labs          3/21/2024    11:32 6/19/2024    09:40 8/18/2024    07:40   Common " Labs   Glucose 94  112  134    BUN 15  14  19    Creatinine 0.82  0.63  0.78    Sodium 142  140  139    Potassium 3.8  4.3  4.2    Chloride 106  105  102    Calcium 9.1  9.2  9.1    Albumin 4.2  4.0  3.9    Total Bilirubin 0.4  0.2  0.4    Alkaline Phosphatase 56  75  66    AST (SGOT) 14  15  15    ALT (SGPT) 10  10  9    WBC   6.19    Hemoglobin   12.8    Hematocrit   38.0    Platelets   197    Hemoglobin A1C 6.10  6.30              Lab Results   Component Value Date    COVID19 NOT DETECTED 07/15/2020    INR 1.10 01/09/2024    BILIRUBINUR Small (1+) (A) 08/18/2024            Assessment and Plan    Diagnoses and all orders for this visit:    1. Herpes zoster without complication (Primary)  -     predniSONE (DELTASONE) 10 MG tablet; Take 40 mg PO (4 tab) daily for days 1-5.  Then, take 30 mg PO (3 tab) daily for 6-8.  Then take 20 mg PO (2 tab) daily for day 9-11.  Then take 10 mg PO (1 tab) for days 12-14.  Dispense: 38 tablet; Refill: 0    2. Essential hypertension  -     losartan (Cozaar) 25 MG tablet; Take 1 tablet by mouth Daily.  Dispense: 90 tablet; Refill: 2      Shingles/Herpes Zoster:  -s/p acyclovir, and rash has nearly resolved  -will trial steroid course for pain    HTN:  -Above goal of < 130/80  -will start losartan to replace lisinopril    Medications Discontinued During This Encounter   Medication Reason    lisinopril (PRINIVIL,ZESTRIL) 10 MG tablet           Follow Up   Return if symptoms worsen or fail to improve.  Will RTC 9/23/24 for previously scheduled appointment  Patient was given instructions and counseling regarding her condition or for health maintenance advice. Please see specific information pulled into the AVS if appropriate.       Julius Tijerina MD  09/05/24  17:22 EDT

## 2024-09-06 DIAGNOSIS — R10.9 RIGHT FLANK PAIN: ICD-10-CM

## 2024-09-06 RX ORDER — HYDROCODONE BITARTRATE AND ACETAMINOPHEN 5; 325 MG/1; MG/1
1 TABLET ORAL EVERY 6 HOURS PRN
Qty: 10 TABLET | Refills: 0 | OUTPATIENT
Start: 2024-09-06

## 2024-09-06 NOTE — TELEPHONE ENCOUNTER
Caller: Dipti Deearet    Relationship: Self    Best call back number: [phone]     Requested Prescriptions:   Requested Prescriptions     Pending Prescriptions Disp Refills    HYDROcodone-acetaminophen (NORCO) 5-325 MG per tablet 10 tablet 0     Sig: Take 1 tablet by mouth Every 6 (Six) Hours As Needed for Mild Pain, Moderate Pain or Severe Pain.        Pharmacy where request should be sent: [facility] [address]  [phone] [facility] [phone] [facility]     Last office visit with prescribing clinician: 9/5/2024   Last telemedicine visit with prescribing clinician: Visit date not found   Next office visit with prescribing clinician: 9/23/2024     Does the patient have less than a 3 day supply:  [x] Yes  [] No    Would you like a call back once the refill request has been completed: [] Yes [x] No    If the office needs to give you a call back, can they leave a voicemail: [] Yes [x] No    Yessica Valentin, PCT   09/06/24 08:25 EDT

## 2024-09-06 NOTE — TELEPHONE ENCOUNTER
Started steroid course yesterday to replace.  I discussed that we were discontinuing this medicine.

## 2024-09-06 NOTE — TELEPHONE ENCOUNTER
Refill request for controlled substance.      Date of request: 9/6/2024   Medication requested: Norco   Last OV: 9/6/24  Last UDS: never  Contract signed: no    Next office visit: 9/23/24    Katt Peña

## 2024-09-13 ENCOUNTER — APPOINTMENT (OUTPATIENT)
Dept: CARDIOLOGY | Facility: HOSPITAL | Age: 79
End: 2024-09-13
Payer: MEDICARE

## 2024-09-13 ENCOUNTER — APPOINTMENT (OUTPATIENT)
Dept: GENERAL RADIOLOGY | Facility: HOSPITAL | Age: 79
End: 2024-09-13
Payer: MEDICARE

## 2024-09-13 ENCOUNTER — HOSPITAL ENCOUNTER (OUTPATIENT)
Facility: HOSPITAL | Age: 79
Setting detail: OBSERVATION
LOS: 1 days | Discharge: HOME | End: 2024-09-14
Attending: EMERGENCY MEDICINE | Admitting: HOSPITALIST
Payer: MEDICARE

## 2024-09-13 DIAGNOSIS — I48.91 ATRIAL FIBRILLATION, UNSPECIFIED TYPE: Primary | ICD-10-CM

## 2024-09-13 DIAGNOSIS — I48.91 ATRIAL FIBRILLATION WITH RVR: ICD-10-CM

## 2024-09-13 LAB
ALBUMIN SERPL-MCNC: 4 G/DL (ref 3.5–5.2)
ALBUMIN/GLOB SERPL: 1.4 G/DL
ALP SERPL-CCNC: 60 U/L (ref 39–117)
ALT SERPL W P-5'-P-CCNC: 14 U/L (ref 1–33)
ANION GAP SERPL CALCULATED.3IONS-SCNC: 16.7 MMOL/L (ref 5–15)
AST SERPL-CCNC: 13 U/L (ref 1–32)
BASOPHILS # BLD AUTO: 0.03 10*3/MM3 (ref 0–0.2)
BASOPHILS NFR BLD AUTO: 0.2 % (ref 0–1.5)
BILIRUB SERPL-MCNC: 0.5 MG/DL (ref 0–1.2)
BUN SERPL-MCNC: 19 MG/DL (ref 8–23)
BUN/CREAT SERPL: 18.4 (ref 7–25)
CALCIUM SPEC-SCNC: 9.4 MG/DL (ref 8.6–10.5)
CHLORIDE SERPL-SCNC: 102 MMOL/L (ref 98–107)
CO2 SERPL-SCNC: 23.3 MMOL/L (ref 22–29)
CREAT SERPL-MCNC: 1.03 MG/DL (ref 0.57–1)
D-LACTATE SERPL-SCNC: 2.1 MMOL/L (ref 0.5–2)
D-LACTATE SERPL-SCNC: 2.5 MMOL/L (ref 0.5–2)
D-LACTATE SERPL-SCNC: 3.7 MMOL/L (ref 0.5–2)
D-LACTATE SERPL-SCNC: 4.7 MMOL/L (ref 0.5–2)
DEPRECATED RDW RBC AUTO: 51.4 FL (ref 37–54)
EGFRCR SERPLBLD CKD-EPI 2021: 54.7 ML/MIN/1.73
EOSINOPHIL # BLD AUTO: 0.02 10*3/MM3 (ref 0–0.4)
EOSINOPHIL NFR BLD AUTO: 0.2 % (ref 0.3–6.2)
ERYTHROCYTE [DISTWIDTH] IN BLOOD BY AUTOMATED COUNT: 14.5 % (ref 12.3–15.4)
GLOBULIN UR ELPH-MCNC: 2.9 GM/DL
GLUCOSE BLDC GLUCOMTR-MCNC: 185 MG/DL (ref 70–99)
GLUCOSE BLDC GLUCOMTR-MCNC: 196 MG/DL (ref 70–99)
GLUCOSE SERPL-MCNC: 210 MG/DL (ref 65–99)
HCT VFR BLD AUTO: 42.3 % (ref 34–46.6)
HGB BLD-MCNC: 14.5 G/DL (ref 12–15.9)
HOLD SPECIMEN: NORMAL
HOLD SPECIMEN: NORMAL
IMM GRANULOCYTES # BLD AUTO: 0.19 10*3/MM3 (ref 0–0.05)
IMM GRANULOCYTES NFR BLD AUTO: 1.5 % (ref 0–0.5)
LYMPHOCYTES # BLD AUTO: 1.76 10*3/MM3 (ref 0.7–3.1)
LYMPHOCYTES NFR BLD AUTO: 13.8 % (ref 19.6–45.3)
MAGNESIUM SERPL-MCNC: 1.8 MG/DL (ref 1.6–2.4)
MAGNESIUM SERPL-MCNC: 2.9 MG/DL (ref 1.6–2.4)
MCH RBC QN AUTO: 33.1 PG (ref 26.6–33)
MCHC RBC AUTO-ENTMCNC: 34.3 G/DL (ref 31.5–35.7)
MCV RBC AUTO: 96.6 FL (ref 79–97)
MONOCYTES # BLD AUTO: 0.38 10*3/MM3 (ref 0.1–0.9)
MONOCYTES NFR BLD AUTO: 3 % (ref 5–12)
NEUTROPHILS NFR BLD AUTO: 10.38 10*3/MM3 (ref 1.7–7)
NEUTROPHILS NFR BLD AUTO: 81.3 % (ref 42.7–76)
NRBC BLD AUTO-RTO: 0 /100 WBC (ref 0–0.2)
NT-PROBNP SERPL-MCNC: 6440 PG/ML (ref 0–1800)
PHOSPHATE SERPL-MCNC: 4 MG/DL (ref 2.5–4.5)
PLATELET # BLD AUTO: 331 10*3/MM3 (ref 140–450)
PMV BLD AUTO: 10 FL (ref 6–12)
POTASSIUM SERPL-SCNC: 3.6 MMOL/L (ref 3.5–5.2)
PROT SERPL-MCNC: 6.9 G/DL (ref 6–8.5)
QT INTERVAL: 295 MS
QTC INTERVAL: 459 MS
RBC # BLD AUTO: 4.38 10*6/MM3 (ref 3.77–5.28)
SODIUM SERPL-SCNC: 142 MMOL/L (ref 136–145)
TROPONIN T SERPL HS-MCNC: 20 NG/L
TSH SERPL DL<=0.05 MIU/L-ACNC: 0.92 UIU/ML (ref 0.27–4.2)
TSH SERPL DL<=0.05 MIU/L-ACNC: 2.06 UIU/ML (ref 0.27–4.2)
WBC NRBC COR # BLD AUTO: 12.76 10*3/MM3 (ref 3.4–10.8)
WHOLE BLOOD HOLD COAG: NORMAL
WHOLE BLOOD HOLD SPECIMEN: NORMAL

## 2024-09-13 PROCEDURE — 83735 ASSAY OF MAGNESIUM: CPT | Performed by: HOSPITALIST

## 2024-09-13 PROCEDURE — 93306 TTE W/DOPPLER COMPLETE: CPT | Performed by: STUDENT IN AN ORGANIZED HEALTH CARE EDUCATION/TRAINING PROGRAM

## 2024-09-13 PROCEDURE — 96366 THER/PROPH/DIAG IV INF ADDON: CPT

## 2024-09-13 PROCEDURE — 25810000003 SODIUM CHLORIDE 0.9 % SOLUTION: Performed by: HOSPITALIST

## 2024-09-13 PROCEDURE — 96375 TX/PRO/DX INJ NEW DRUG ADDON: CPT

## 2024-09-13 PROCEDURE — G0378 HOSPITAL OBSERVATION PER HR: HCPCS

## 2024-09-13 PROCEDURE — 83735 ASSAY OF MAGNESIUM: CPT | Performed by: EMERGENCY MEDICINE

## 2024-09-13 PROCEDURE — 99285 EMERGENCY DEPT VISIT HI MDM: CPT

## 2024-09-13 PROCEDURE — 71045 X-RAY EXAM CHEST 1 VIEW: CPT

## 2024-09-13 PROCEDURE — 99291 CRITICAL CARE FIRST HOUR: CPT

## 2024-09-13 PROCEDURE — 84443 ASSAY THYROID STIM HORMONE: CPT | Performed by: EMERGENCY MEDICINE

## 2024-09-13 PROCEDURE — 93005 ELECTROCARDIOGRAM TRACING: CPT

## 2024-09-13 PROCEDURE — 83880 ASSAY OF NATRIURETIC PEPTIDE: CPT | Performed by: EMERGENCY MEDICINE

## 2024-09-13 PROCEDURE — 93306 TTE W/DOPPLER COMPLETE: CPT

## 2024-09-13 PROCEDURE — 84484 ASSAY OF TROPONIN QUANT: CPT | Performed by: EMERGENCY MEDICINE

## 2024-09-13 PROCEDURE — 83605 ASSAY OF LACTIC ACID: CPT | Performed by: EMERGENCY MEDICINE

## 2024-09-13 PROCEDURE — 96365 THER/PROPH/DIAG IV INF INIT: CPT

## 2024-09-13 PROCEDURE — 82948 REAGENT STRIP/BLOOD GLUCOSE: CPT

## 2024-09-13 PROCEDURE — 36415 COLL VENOUS BLD VENIPUNCTURE: CPT | Performed by: HOSPITALIST

## 2024-09-13 PROCEDURE — 63710000001 INSULIN LISPRO (HUMAN) PER 5 UNITS: Performed by: HOSPITALIST

## 2024-09-13 PROCEDURE — 80053 COMPREHEN METABOLIC PANEL: CPT | Performed by: EMERGENCY MEDICINE

## 2024-09-13 PROCEDURE — 85025 COMPLETE CBC W/AUTO DIFF WBC: CPT

## 2024-09-13 PROCEDURE — 99223 1ST HOSP IP/OBS HIGH 75: CPT | Performed by: HOSPITALIST

## 2024-09-13 PROCEDURE — 93005 ELECTROCARDIOGRAM TRACING: CPT | Performed by: EMERGENCY MEDICINE

## 2024-09-13 PROCEDURE — 84100 ASSAY OF PHOSPHORUS: CPT | Performed by: HOSPITALIST

## 2024-09-13 PROCEDURE — 25010000002 MAGNESIUM SULFATE 2 GM/50ML SOLUTION: Performed by: HOSPITALIST

## 2024-09-13 PROCEDURE — 84443 ASSAY THYROID STIM HORMONE: CPT | Performed by: HOSPITALIST

## 2024-09-13 PROCEDURE — 25010000002 DIGOXIN PER 500 MCG: Performed by: HOSPITALIST

## 2024-09-13 RX ORDER — CARVEDILOL 6.25 MG/1
6.25 TABLET ORAL 2 TIMES DAILY WITH MEALS
Status: DISCONTINUED | OUTPATIENT
Start: 2024-09-13 | End: 2024-09-14

## 2024-09-13 RX ORDER — PREDNISONE 10 MG/1
10 TABLET ORAL DAILY
Status: DISCONTINUED | OUTPATIENT
Start: 2024-09-13 | End: 2024-09-13

## 2024-09-13 RX ORDER — SODIUM CHLORIDE 0.9 % (FLUSH) 0.9 %
10 SYRINGE (ML) INJECTION AS NEEDED
Status: DISCONTINUED | OUTPATIENT
Start: 2024-09-13 | End: 2024-09-14 | Stop reason: HOSPADM

## 2024-09-13 RX ORDER — BISACODYL 5 MG/1
5 TABLET, DELAYED RELEASE ORAL DAILY PRN
Status: DISCONTINUED | OUTPATIENT
Start: 2024-09-13 | End: 2024-09-13 | Stop reason: SDUPTHER

## 2024-09-13 RX ORDER — ACETAMINOPHEN 325 MG/1
650 TABLET ORAL EVERY 4 HOURS PRN
Status: DISCONTINUED | OUTPATIENT
Start: 2024-09-13 | End: 2024-09-14 | Stop reason: HOSPADM

## 2024-09-13 RX ORDER — NICOTINE POLACRILEX 4 MG
15 LOZENGE BUCCAL
Status: DISCONTINUED | OUTPATIENT
Start: 2024-09-13 | End: 2024-09-14 | Stop reason: HOSPADM

## 2024-09-13 RX ORDER — POTASSIUM CHLORIDE 750 MG/1
40 CAPSULE, EXTENDED RELEASE ORAL ONCE
Status: COMPLETED | OUTPATIENT
Start: 2024-09-13 | End: 2024-09-13

## 2024-09-13 RX ORDER — POLYETHYLENE GLYCOL 3350 17 G/17G
17 POWDER, FOR SOLUTION ORAL DAILY PRN
Status: DISCONTINUED | OUTPATIENT
Start: 2024-09-13 | End: 2024-09-13 | Stop reason: SDUPTHER

## 2024-09-13 RX ORDER — SODIUM CHLORIDE 9 MG/ML
40 INJECTION, SOLUTION INTRAVENOUS AS NEEDED
Status: DISCONTINUED | OUTPATIENT
Start: 2024-09-13 | End: 2024-09-14 | Stop reason: HOSPADM

## 2024-09-13 RX ORDER — ALBUTEROL SULFATE 0.83 MG/ML
2.5 SOLUTION RESPIRATORY (INHALATION) EVERY 6 HOURS PRN
Status: DISCONTINUED | OUTPATIENT
Start: 2024-09-13 | End: 2024-09-14 | Stop reason: HOSPADM

## 2024-09-13 RX ORDER — ACETAMINOPHEN 650 MG/1
650 SUPPOSITORY RECTAL EVERY 4 HOURS PRN
Status: DISCONTINUED | OUTPATIENT
Start: 2024-09-13 | End: 2024-09-14 | Stop reason: HOSPADM

## 2024-09-13 RX ORDER — AMOXICILLIN 250 MG
2 CAPSULE ORAL 2 TIMES DAILY
Status: DISCONTINUED | OUTPATIENT
Start: 2024-09-13 | End: 2024-09-14 | Stop reason: HOSPADM

## 2024-09-13 RX ORDER — ACETAMINOPHEN 160 MG/5ML
650 SOLUTION ORAL EVERY 4 HOURS PRN
Status: DISCONTINUED | OUTPATIENT
Start: 2024-09-13 | End: 2024-09-14 | Stop reason: HOSPADM

## 2024-09-13 RX ORDER — DEXTROSE MONOHYDRATE 25 G/50ML
25 INJECTION, SOLUTION INTRAVENOUS
Status: DISCONTINUED | OUTPATIENT
Start: 2024-09-13 | End: 2024-09-14 | Stop reason: HOSPADM

## 2024-09-13 RX ORDER — POLYETHYLENE GLYCOL 3350 17 G/17G
17 POWDER, FOR SOLUTION ORAL DAILY PRN
Status: DISCONTINUED | OUTPATIENT
Start: 2024-09-13 | End: 2024-09-14 | Stop reason: HOSPADM

## 2024-09-13 RX ORDER — BISACODYL 5 MG/1
5 TABLET, DELAYED RELEASE ORAL DAILY PRN
Status: DISCONTINUED | OUTPATIENT
Start: 2024-09-13 | End: 2024-09-14 | Stop reason: HOSPADM

## 2024-09-13 RX ORDER — DILTIAZEM HCL IN NACL,ISO-OSM 125 MG/125
5-15 PLASTIC BAG, INJECTION (ML) INTRAVENOUS
Status: DISCONTINUED | OUTPATIENT
Start: 2024-09-13 | End: 2024-09-14 | Stop reason: HOSPADM

## 2024-09-13 RX ORDER — IBUPROFEN 600 MG/1
1 TABLET ORAL
Status: DISCONTINUED | OUTPATIENT
Start: 2024-09-13 | End: 2024-09-14 | Stop reason: HOSPADM

## 2024-09-13 RX ORDER — DILTIAZEM HYDROCHLORIDE 5 MG/ML
20 INJECTION INTRAVENOUS ONCE
Status: COMPLETED | OUTPATIENT
Start: 2024-09-13 | End: 2024-09-13

## 2024-09-13 RX ORDER — FERROUS SULFATE 325(65) MG
325 TABLET ORAL
Status: DISCONTINUED | OUTPATIENT
Start: 2024-09-14 | End: 2024-09-14 | Stop reason: HOSPADM

## 2024-09-13 RX ORDER — DIGOXIN 0.25 MG/ML
250 INJECTION INTRAMUSCULAR; INTRAVENOUS ONCE
Status: COMPLETED | OUTPATIENT
Start: 2024-09-13 | End: 2024-09-13

## 2024-09-13 RX ORDER — INSULIN LISPRO 100 [IU]/ML
2-7 INJECTION, SOLUTION INTRAVENOUS; SUBCUTANEOUS
Status: DISCONTINUED | OUTPATIENT
Start: 2024-09-13 | End: 2024-09-14 | Stop reason: HOSPADM

## 2024-09-13 RX ORDER — SODIUM CHLORIDE 9 MG/ML
75 INJECTION, SOLUTION INTRAVENOUS CONTINUOUS
Status: DISCONTINUED | OUTPATIENT
Start: 2024-09-13 | End: 2024-09-14 | Stop reason: HOSPADM

## 2024-09-13 RX ORDER — MAGNESIUM SULFATE HEPTAHYDRATE 40 MG/ML
2 INJECTION, SOLUTION INTRAVENOUS ONCE
Status: COMPLETED | OUTPATIENT
Start: 2024-09-13 | End: 2024-09-13

## 2024-09-13 RX ORDER — ONDANSETRON 4 MG/1
4 TABLET, ORALLY DISINTEGRATING ORAL EVERY 6 HOURS PRN
Status: DISCONTINUED | OUTPATIENT
Start: 2024-09-13 | End: 2024-09-14 | Stop reason: HOSPADM

## 2024-09-13 RX ORDER — HYDROCODONE BITARTRATE AND ACETAMINOPHEN 5; 325 MG/1; MG/1
1 TABLET ORAL EVERY 4 HOURS PRN
Status: DISCONTINUED | OUTPATIENT
Start: 2024-09-13 | End: 2024-09-14 | Stop reason: HOSPADM

## 2024-09-13 RX ORDER — SODIUM CHLORIDE 0.9 % (FLUSH) 0.9 %
10 SYRINGE (ML) INJECTION EVERY 12 HOURS SCHEDULED
Status: DISCONTINUED | OUTPATIENT
Start: 2024-09-13 | End: 2024-09-14 | Stop reason: HOSPADM

## 2024-09-13 RX ORDER — BISACODYL 10 MG
10 SUPPOSITORY, RECTAL RECTAL DAILY PRN
Status: DISCONTINUED | OUTPATIENT
Start: 2024-09-13 | End: 2024-09-14 | Stop reason: HOSPADM

## 2024-09-13 RX ORDER — ONDANSETRON 2 MG/ML
4 INJECTION INTRAMUSCULAR; INTRAVENOUS EVERY 6 HOURS PRN
Status: DISCONTINUED | OUTPATIENT
Start: 2024-09-13 | End: 2024-09-14 | Stop reason: HOSPADM

## 2024-09-13 RX ORDER — BISACODYL 10 MG
10 SUPPOSITORY, RECTAL RECTAL DAILY PRN
Status: DISCONTINUED | OUTPATIENT
Start: 2024-09-13 | End: 2024-09-13 | Stop reason: SDUPTHER

## 2024-09-13 RX ORDER — AMOXICILLIN 250 MG
2 CAPSULE ORAL 2 TIMES DAILY PRN
Status: DISCONTINUED | OUTPATIENT
Start: 2024-09-13 | End: 2024-09-14 | Stop reason: HOSPADM

## 2024-09-13 RX ADMIN — MAGNESIUM SULFATE HEPTAHYDRATE 2 G: 40 INJECTION, SOLUTION INTRAVENOUS at 16:19

## 2024-09-13 RX ADMIN — SODIUM CHLORIDE 75 ML/HR: 9 INJECTION, SOLUTION INTRAVENOUS at 16:19

## 2024-09-13 RX ADMIN — HYDROCODONE BITARTRATE AND ACETAMINOPHEN 1 TABLET: 5; 325 TABLET ORAL at 20:14

## 2024-09-13 RX ADMIN — Medication 5 MG: at 20:14

## 2024-09-13 RX ADMIN — DILTIAZEM HYDROCHLORIDE 5 MG/HR: 5 INJECTION INTRAVENOUS at 14:18

## 2024-09-13 RX ADMIN — INSULIN LISPRO 2 UNITS: 100 INJECTION, SOLUTION INTRAVENOUS; SUBCUTANEOUS at 17:36

## 2024-09-13 RX ADMIN — POTASSIUM CHLORIDE 40 MEQ: 750 CAPSULE, EXTENDED RELEASE ORAL at 17:34

## 2024-09-13 RX ADMIN — APIXABAN 5 MG: 5 TABLET, FILM COATED ORAL at 20:14

## 2024-09-13 RX ADMIN — INSULIN LISPRO 2 UNITS: 100 INJECTION, SOLUTION INTRAVENOUS; SUBCUTANEOUS at 20:14

## 2024-09-13 RX ADMIN — DILTIAZEM HYDROCHLORIDE 20 MG: 5 INJECTION, SOLUTION INTRAVENOUS at 12:36

## 2024-09-13 RX ADMIN — DIGOXIN 250 MCG: 0.25 INJECTION INTRAMUSCULAR; INTRAVENOUS at 15:33

## 2024-09-13 RX ADMIN — CARVEDILOL 6.25 MG: 6.25 TABLET, FILM COATED ORAL at 17:33

## 2024-09-13 NOTE — Clinical Note
Level of Care: Telemetry [5]   Diagnosis: A-fib [060756]   Admitting Physician: REINA GALLO [[id number]]   Attending Physician: REINA GALLO [[id number]]   Certification: I Certify That Inpatient Hospital Services Are Medically Necessary For Greater Than 2 Midnights

## 2024-09-13 NOTE — PLAN OF CARE
Goal Outcome Evaluation:           Progress: no change  Outcome Evaluation: Recieved pt from the ED, on Cardizem gtt for afib RVR,  titrating gtt as ordered,  currently infusing at 15 mg/hr with heart rate   104/60 Call light is within reach  bed inlow position.

## 2024-09-13 NOTE — ED PROVIDER NOTES
Time: 12:27 PM EDT  Date of encounter:  9/13/2024  Independent Historian/Clinical History and Information was obtained by:   Patient    History is limited by: N/A    Chief Complaint: Palpitation      History of Present Illness:  Patient is a 79 y.o. year old female who presents to the emergency department for evaluation of palpitations and tachycardia noted this morning.  The patient reports that she has been taking prednisone recently for shingles.  Patient also reports chest pain.  Patient reports a near syncopal episode stating that she felt like she was going to pass out.  Patient denies subjective neurological deficit but states that for a moment she could not think and that her speech felt slurred.      Patient Care Team  Primary Care Provider: Julius Tijerina MD    Past Medical History:     Allergies   Allergen Reactions    Penicillins Rash     Mild rash- pt states can take amoxicillin or keflex      Statins Other (See Comments)     Hair loss crestor     Past Medical History:   Diagnosis Date    Atrial fibrillation with RVR     FOLLOWS W/LINK, NO RECENT ISSUES, NO CP    Calculus of gallbladder with acute on chronic cholecystitis with obstruction 05/09/2022    Cancer     SKIN CANCER REMOVED    Colon polyp     Contusion of face     Diabetes mellitus     BS IN  THE A.M. 120 ON AVERAGE    Essential hypertension     CONT W/MEDS    Fall     Gallstones     Hyperlipidemia     Kidney stone     Osteoarthritis     Osteopenia     Renal insufficiency     due to nsaids (8/2017 states labs wnl) NO CURRENT PROBLEMS    Snoring     NO CPAP    Wound infection after surgery 05/09/2022     Past Surgical History:   Procedure Laterality Date    BLEPHAROPLASTY Bilateral     CARDIAC CATHETERIZATION Left 2011    [facility], Dr. Cooper Ramires, NO INTERVENTION    CHOLECYSTECTOMY N/A 5/4/2022    Procedure: CHOLECYSTECTOMY LAPAROSCOPIC;  Surgeon: Josue Jernigan MD;  Location: [facility] MAIN OR;  Service: General;   Laterality: N/A;    COLONOSCOPY  2014    negative    COLONOSCOPY  2011    polyp-repeat 2014    COLONOSCOPY N/A 7/17/2024    Procedure: COLONOSCOPY;  Surgeon: Simba Nuno MD;  Location: [facility] ENDOSCOPY;  Service: Gastroenterology;  Laterality: N/A;  DIVERTICULOSIS    CYSTOSCOPY  09/2016    negative- Dr. Murphy per patient urethral polyp noted    ERCP N/A 04/20/2022    Procedure: ENDOSCOPIC RETROGRADE CHOLANGIOPANCREATOGRAPHY WITH SPHINCTEROTOMY, BALLOON SWEEP, COMMON BILE DUCT STENT PLACED, PANCREATIC DUCT STENT PLACED;  Surgeon: Gurvinder Perry MD;  Location: [facility] ENDOSCOPY;  Service: Gastroenterology;  Laterality: N/A;  BILE DUCT STONES    ERCP N/A 5/10/2022    Procedure: ENDOSCOPIC RETROGRADE CHOLANGIOPANCREATOGRAPHY WITH STENT REMOVAL X2, SPHINCTEROTOMY, 12-15MM BALLOON SWEEP WITH BILE DUCT STONE REMOVAL;  Surgeon: Gurvinder Perry MD;  Location: [facility] ENDOSCOPY;  Service: Gastroenterology;  Laterality: N/A;  BILE DUCT STONES    SKIN CANCER EXCISION  04/2016    basal cell on back    TOTAL KNEE ARTHROPLASTY Right 1/17/2024    Procedure: RIGHT TOTAL KNEE ARTHROPLASTY WITH CLARISSA ROBOT;  Surgeon: Geraldo Gann MD;  Location: [facility] MAIN OR;  Service: Robotics - Ortho;  Laterality: Right;    TUBAL ABDOMINAL LIGATION      WRIST FRACTURE SURGERY Left     ORIF     Family History   Problem Relation Age of Onset    Heart disease Mother         afib    Lung disease Mother         interstitial lung disease    Stroke Father         hemmorhagic stroke-aneurysm    Diabetes Brother     Malig Hyperthermia Neg Hx     Colon cancer Neg Hx        Home Medications:  Prior to Admission medications    Medication Sig Start Date End Date Taking? Authorizing Provider   Accu-Chek Guide test strip USE 1 STRIP TWICE DAILY    Provider, MD Zuleyma   albuterol sulfate  (90 Base) MCG/ACT inhaler Inhale 2 puffs Every 4 (Four) Hours As Needed for Wheezing or Shortness of Air. 6/20/24   Julius Tijerina MD    carvedilol (COREG) 6.25 MG tablet Take 1 tablet by mouth 2 (Two) Times a Day With Meals. 7/18/22   Zuleyma Hennessy MD   cyanocobalamin (VITAMIN B-12) 1000 MCG tablet Take 0.5 tablets by mouth Daily.    Zuleyma Hennessy MD   dilTIAZem XR (DILACOR XR) 240 MG 24 hr capsule Take 1 capsule by mouth Daily.    Zuleyma Hennessy MD   Eliquis 5 MG tablet tablet TAKE 1 TABLET BY MOUTH TWICE DAILY 8/30/24   Julius Tijerina MD   ferrous sulfate (FeroSul) 325 (65 FE) MG tablet TAKE 1 TABLET BY MOUTH DAILY WITH BREAKFAST. 7/25/24   Julius Tijerina MD   furosemide (LASIX) 20 MG tablet Take 1 tablet by mouth Daily. Doesn't take every day    Zuleyma Hennessy MD   HYDROcodone-acetaminophen (NORCO) 5-325 MG per tablet Take 1 tablet by mouth Every 6 (Six) Hours As Needed for Mild Pain, Moderate Pain or Severe Pain.  Patient not taking: Reported on 9/5/2024 8/18/24   Karl Pinto MD   losartan (Cozaar) 25 MG tablet Take 1 tablet by mouth Daily. 9/5/24   Julius Tijerina MD   MAGNESIUM PO Take 1 tablet by mouth Daily.  Patient not taking: Reported on 9/5/2024    Zuleyma Hennessy MD   metFORMIN (GLUCOPHAGE) 500 MG tablet Take 1 tablet by mouth Daily With Breakfast. 6/27/24   Julius Tijerina MD   Multiple Vitamin (MULTIVITAMIN) tablet Take 1 tablet by mouth Daily.    Zuleyma Hennessy MD   polyethylene glycol (MIRALAX) 17 GM/SCOOP powder Take 17 g by mouth 2 (Two) Times a Day As Needed (constipation). 1/19/24   Mg Mcdaniel MD   predniSONE (DELTASONE) 10 MG tablet Take 40 mg PO (4 tab) daily for days 1-5.  Then, take 30 mg PO (3 tab) daily for 6-8.  Then take 20 mg PO (2 tab) daily for day 9-11.  Then take 10 mg PO (1 tab) for days 12-14. 9/5/24   Julius Tijerina MD        Social History:   Social History     Tobacco Use    Smoking status: Never     Passive exposure: Past    Smokeless tobacco: Never   Vaping Use    Vaping status: Never Used   Substance Use Topics    Alcohol use: Never     "Drug use: Never         Review of Systems:  Review of Systems   Constitutional:  Negative for chills and fever.   HENT:  Negative for congestion, rhinorrhea and sore throat.    Eyes:  Negative for pain and visual disturbance.   Respiratory:  Negative for apnea, cough, chest tightness and shortness of breath.    Cardiovascular:  Positive for palpitations. Negative for chest pain.   Gastrointestinal:  Negative for abdominal pain, diarrhea, nausea and vomiting.   Genitourinary:  Negative for difficulty urinating and dysuria.   Musculoskeletal:  Negative for joint swelling and myalgias.   Skin:  Negative for color change.   Neurological:  Negative for seizures and headaches.   Psychiatric/Behavioral: Negative.     All other systems reviewed and are negative.       Physical Exam:  /61   Pulse 102   Temp 97.6 °F (36.4 °C) (Oral)   Resp 14   Ht 162.6 cm (64\")   Wt 62.6 kg (138 lb 0.1 oz)   SpO2 92%   BMI 23.69 kg/m²     Physical Exam  Vitals and nursing note reviewed.   Constitutional:       General: She is not in acute distress.     Appearance: Normal appearance. She is not toxic-appearing.   HENT:      Head: Normocephalic and atraumatic.      Jaw: There is normal jaw occlusion.   Eyes:      General: Lids are normal.      Extraocular Movements: Extraocular movements intact.      Conjunctiva/sclera: Conjunctivae normal.      Pupils: Pupils are equal, round, and reactive to light.   Cardiovascular:      Rate and Rhythm: Tachycardia present. Rhythm irregular.      Pulses: Normal pulses.      Heart sounds: Normal heart sounds.   Pulmonary:      Effort: Pulmonary effort is normal. No respiratory distress.      Breath sounds: Normal breath sounds. No wheezing or rhonchi.   Chest:      Comments: (+) Tachycardia  Abdominal:      General: Abdomen is flat.      Palpations: Abdomen is soft.      Tenderness: There is no abdominal tenderness. There is no guarding or rebound.   Musculoskeletal:         General: Normal " range of motion.      Cervical back: Normal range of motion and neck supple.      Right lower leg: No edema.      Left lower leg: No edema.   Skin:     General: Skin is warm and dry.   Neurological:      Mental Status: She is alert and oriented to person, place, and time. Mental status is at baseline.   Psychiatric:         Mood and Affect: Mood normal.                  Procedures:  Procedures      Medical Decision Making:      Comorbidities that affect care:    Atrial fibrillation    External Notes reviewed:    Previous ED Note: Patient was last seen by Dr. Lopes in the emergency department for abdominal pain and flank pain.      The following orders were placed and all results were independently analyzed by me:  Orders Placed This Encounter   Procedures    XR Chest 1 View    [place] Draw    Comprehensive Metabolic Panel    Magnesium    Single High Sensitivity Troponin T    TSH    CBC Auto Differential    Lactic Acid, Plasma    BNP    STAT Lactic Acid, Reflex    Urinalysis With Microscopic If Indicated (No Culture) - Urine, Clean Catch    NPO Diet NPO Type: Strict NPO    Undress & Gown    Continuous Pulse Oximetry    Inpatient Hospitalist Consult    Oxygen Therapy- Nasal Cannula; Titrate 1-6 LPM Per SpO2; 90 - 95%    ECG 12 Lead ED Triage Standing Order; Dysrhythmia    Insert Peripheral IV    Inpatient Admission    CBC & Differential    Green Top (Gel)    Lavender Top    Gold Top - SST    Light Blue Top       Medications Given in the Emergency Department:  Medications   sodium chloride 0.9 % flush 10 mL (has no administration in time range)   dilTIAZem (CARDIZEM) 125 mg in 125 mL sodium chloride  infusion (0 mg/hr Intravenous Hold 9/13/24 1346)   sodium chloride 0.9 % infusion (has no administration in time range)   magnesium sulfate 2g/50 mL (PREMIX) infusion (has no administration in time range)   dilTIAZem (CARDIZEM) injection 20 mg (20 mg Intravenous Given 9/13/24 1236)        ED Course:         Labs:    Lab  Results (last 24 hours)       Procedure Component Value Units Date/Time    CBC & Differential [[id number]]  (Abnormal) Collected: 09/13/24 1159    Specimen: Blood Updated: 09/13/24 1208    Narrative:      The following orders were created for panel order CBC & Differential.  Procedure                               Abnormality         Status                     ---------                               -----------         ------                     CBC Auto Differential[[id number]]        Abnormal            Final result                 Please view results for these tests on the individual orders.    Comprehensive Metabolic Panel [[id number]]  (Abnormal) Collected: 09/13/24 1159    Specimen: Blood Updated: 09/13/24 1228     Glucose 210 mg/dL      BUN 19 mg/dL      Creatinine 1.03 mg/dL      Sodium 142 mmol/L      Potassium 3.6 mmol/L      Chloride 102 mmol/L      CO2 23.3 mmol/L      Calcium 9.4 mg/dL      Total Protein 6.9 g/dL      Albumin 4.0 g/dL      ALT (SGPT) 14 U/L      AST (SGOT) 13 U/L      Alkaline Phosphatase 60 U/L      Total Bilirubin 0.5 mg/dL      Globulin 2.9 gm/dL      A/G Ratio 1.4 g/dL      BUN/Creatinine Ratio 18.4     Anion Gap 16.7 mmol/L      eGFR 54.7 mL/min/1.73     Narrative:      GFR Normal >60  Chronic Kidney Disease <60  Kidney Failure <15    The GFR formula is only valid for adults with stable renal function between ages 18 and 70.    Magnesium [[id number]]  (Normal) Collected: 09/13/24 1159    Specimen: Blood Updated: 09/13/24 1228     Magnesium 1.8 mg/dL     Single High Sensitivity Troponin T [[id number]]  (Abnormal) Collected: 09/13/24 1159    Specimen: Blood Updated: 09/13/24 1232     HS Troponin T 20 ng/L     Narrative:      High Sensitive Troponin T Reference Range:  <14.0 ng/L- Negative Female for AMI  <22.0 ng/L- Negative Male for AMI  >=14 - Abnormal Female indicating possible myocardial injury.  >=22 - Abnormal Male indicating possible myocardial injury.   Clinicians would have to  utilize clinical acumen, EKG, Troponin, and serial changes to determine if it is an Acute Myocardial Infarction or myocardial injury due to an underlying chronic condition.         TSH [[id number]]  (Normal) Collected: 09/13/24 1159    Specimen: Blood Updated: 09/13/24 1232     TSH 2.060 uIU/mL     CBC Auto Differential [[id number]]  (Abnormal) Collected: 09/13/24 1159    Specimen: Blood Updated: 09/13/24 1208     WBC 12.76 10*3/mm3      RBC 4.38 10*6/mm3      Hemoglobin 14.5 g/dL      Hematocrit 42.3 %      MCV 96.6 fL      MCH 33.1 pg      MCHC 34.3 g/dL      RDW 14.5 %      RDW-SD 51.4 fl      MPV 10.0 fL      Platelets 331 10*3/mm3      Neutrophil % 81.3 %      Lymphocyte % 13.8 %      Monocyte % 3.0 %      Eosinophil % 0.2 %      Basophil % 0.2 %      Immature Grans % 1.5 %      Neutrophils, Absolute 10.38 10*3/mm3      Lymphocytes, Absolute 1.76 10*3/mm3      Monocytes, Absolute 0.38 10*3/mm3      Eosinophils, Absolute 0.02 10*3/mm3      Basophils, Absolute 0.03 10*3/mm3      Immature Grans, Absolute 0.19 10*3/mm3      nRBC 0.0 /100 WBC     BNP [[id number]]  (Abnormal) Collected: 09/13/24 1159    Specimen: Blood Updated: 09/13/24 1332     proBNP 6,440.0 pg/mL     Narrative:      This assay is used as an aid in the diagnosis of individuals suspected of having heart failure. It can be used as an aid in the diagnosis of acute decompensated heart failure (ADHF) in patients presenting with signs and symptoms of ADHF to the emergency department (ED). In addition, NT-proBNP of <300 pg/mL indicates ADHF is not likely.    Age Range Result Interpretation  NT-proBNP Concentration (pg/mL:      <50             Positive            >450                   Gray                 300-450                    Negative             <300    50-75           Positive            >900                  Gray                300-900                  Negative            <300      >75             Positive            >1800                  Martinez                 300-1800                  Negative            <300    Lactic Acid, Plasma [[id number]]  (Abnormal) Collected: 09/13/24 1226    Specimen: Blood Updated: 09/13/24 1333     Lactate 3.7 mmol/L              Imaging:    XR Chest 1 View    Result Date: 9/13/2024  XR CHEST 1 VW Date of Exam: 9/13/2024 12:05 PM EDT Indication: Dysrhythmia Triage Protocol Comparison: Chest radiograph 6/19/2024. Findings: Enlarged cardiac silhouette, unchanged. Mild chronic/senescent changes of the lungs. No focal consolidation. No pleural effusion or pneumothorax. Osseous structures are unchanged.     Impression: No acute cardiopulmonary findings. Electronically Signed: Efraín Easton MD  9/13/2024 12:21 PM EDT  Workstation ID: [id number]       Differential Diagnosis and Discussion:    Dyspnea: Differential diagnosis includes but is not limited to metabolic acidosis, neurological disorders, psychogenic, asthma, pneumothorax, upper airway obstruction, COPD, pneumonia, noncardiogenic pulmonary edema, interstitial lung disease, anemia, congestive heart failure, and pulmonary embolism  Palpitations: Differential diagnosis includes but is not limited to anxiety, atrioventricular blocks, mitral valve disease, hypoxia, coronary artery disease, hypokalemia, anemia, fever, COPD, congestive heart failure, pericarditis, Rito-Parkinson-White syndrome, pulmonary embolism, SVT, atrial fibrillation, atrial flutter, sinus tachycardia, thyrotoxicosis, and pheochromocytoma.    All labs were reviewed and interpreted by me.  All X-rays impressions were independently interpreted by me.  EKG was interpreted by me.    MDM     The patient´s CBC that was reviewed and interpreted by me shows no abnormalities of critical concern. Of note, there is no anemia requiring a blood transfusion and the platelet count is acceptable.  The patient´s CMP that was reviewed and interpretted by me shows no abnormalities of critical concern. Of note, the patient´s sodium and  potassium are acceptable. The patient´s liver enzymes are unremarkable. The patient´s renal function (creatinine) is preserved. The patient has a normal anion gap.      Patient was placed on the cardiac monitor after being given Cardizem.  They were monitored for ventricular ectopy, arrhythmia, tachycardia, hypoxia, and changes in blood pressure.  Patient was rechecked several times throughout their stay for mental status decline and for reassessment of worsening changes in vital signs.     Total Critical Care time of 35 minutes. Total critical care time documented does not include time spent on separately billed procedures for services of nurses or physician assistants. I personally saw and examined the patient. I have reviewed all diagnostic interpretations and treatment plans as written. I was present for the key portions of any procedures performed and the inclusive time noted in any critical care statement. Critical care time includes patient management by me, time spent at the patients bedside,  time to review lab and imaging results, discussing patient care, documentation in the medical record, and time spent with family or caregiver.          Patient Care Considerations:    PERC: I used the PERC score to risk stratify the patient for PE and a CT of the chest was considered but ultimately not indicated in today's visit.      Consultants/Shared Management Plan:    Case was discussed with Dr. Mederos who agrees with admission.    Social Determinants of Health:    Patient is independent, reliable, and has access to care.       Disposition and Care Coordination:    Admit:   Through independent evaluation of the patient's history, physical, and imperical data, the patient meets criteria for inpatient admission to the hospital.        Final diagnoses:   Atrial fibrillation, unspecified type        ED Disposition       ED Disposition   Decision to Admit    Condition   --    Comment   Level of Care: Telemetry [5]    Diagnosis: A-fib [224325]   Admitting Physician: REINA GALLO [[id number]]   Attending Physician: REINA GALLO [[id number]]   Certification: I Certify That Inpatient Hospital Services Are Medically Necessary For Greater Than 2 Midnights                 This medical record created using voice recognition software.             Rosalva Apple MD  09/13/24 1517

## 2024-09-13 NOTE — H&P
[facility]   HOSPITALIST HISTORY AND PHYSICAL  Date: 2024   Patient Name: Ely Coats  : 1945  MRN: [id number]  Primary Care Physician:  Julius Tijerina MD  Date of admission: 2024    Subjective palpitation  Subjective     Chief Complaint: Palpitations    HPI: Patient is an 79-year-old female who presents to the emergency room for evaluation of palpitations and tachycardia that started this morning.  Patient has been taking prednisone recently for shingles.  Patient also reports some chest pain and near syncope.  She denied any slurring of speech or subjective neurological deficit.    On arrival to the ED, patient temperature 97.6, pulse of 147, respiratory rate of 14, blood pressure 135/98, and she saturating 97% on room air.  Patient was started on a Cardizem drip.  Most recent heart rate is 108.    On labs, patient's sodium is 142, potassium is 3.6, bicarb is 23.3, lactate is 3.7, creatinine is 1.03.  Glucose is 210.  White blood cell count is 12.76.  Leukocytosis is secondary to recent and current steroid use.    Chest x-ray is clear.  Personal History     Past Medical History:  Past Medical History:   Diagnosis Date    Atrial fibrillation with RVR     FOLLOWS W/LINK, NO RECENT ISSUES, NO CP    Calculus of gallbladder with acute on chronic cholecystitis with obstruction 2022    Cancer     SKIN CANCER REMOVED    Colon polyp     Contusion of face     Diabetes mellitus     BS IN  THE A.M. 120 ON AVERAGE    Essential hypertension     CONT W/MEDS    Fall     Gallstones     Hyperlipidemia     Kidney stone     Osteoarthritis     Osteopenia     Renal insufficiency     due to nsaids (2017 states labs wnl) NO CURRENT PROBLEMS    Snoring     NO CPAP    Wound infection after surgery 2022       Past Surgical History:  Past Surgical History:   Procedure Laterality Date    BLEPHAROPLASTY Bilateral     [facility]    CHOLECYSTECTOMY N/A 5/4/2022    Procedure: CHOLECYSTECTOMY LAPAROSCOPIC;  Surgeon: Josue Jernigan MD;  Location: [facility] MAIN OR;  Service: General;  Laterality: N/A;    COLONOSCOPY  2014    negative    COLONOSCOPY  2011    polyp-repeat 2014    COLONOSCOPY N/A 7/17/2024    Procedure: COLONOSCOPY;  Surgeon: Simba Nuno MD;  Location: [facility] ENDOSCOPY;  Service: Gastroenterology;  Laterality: N/A;  DIVERTICULOSIS    CYSTOSCOPY  09/2016    negative- Dr. Murphy per patient urethral polyp noted    ERCP N/A 04/20/2022    Procedure: ENDOSCOPIC RETROGRADE CHOLANGIOPANCREATOGRAPHY WITH SPHINCTEROTOMY, BALLOON SWEEP, COMMON BILE DUCT STENT PLACED, PANCREATIC DUCT STENT PLACED;  Surgeon: Gurvinder Perry MD;  Location: [facility] ENDOSCOPY;  Service: Gastroenterology;  Laterality: N/A;  BILE DUCT STONES    ERCP N/A 5/10/2022    Procedure: ENDOSCOPIC RETROGRADE CHOLANGIOPANCREATOGRAPHY WITH STENT REMOVAL X2, SPHINCTEROTOMY, 12-15MM BALLOON SWEEP WITH BILE DUCT STONE REMOVAL;  Surgeon: Gurvinder Perry MD;  Location: [facility] ENDOSCOPY;  Service: Gastroenterology;  Laterality: N/A;  BILE DUCT STONES    SKIN CANCER EXCISION  04/2016    basal cell on back    TOTAL KNEE ARTHROPLASTY Right 1/17/2024    Procedure: RIGHT TOTAL KNEE ARTHROPLASTY WITH CLARISSA ROBOT;  Surgeon: Geraldo Gann MD;  Location: [facility] MAIN OR;  Service: Robotics - Ortho;  Laterality: Right;    TUBAL ABDOMINAL LIGATION      WRIST FRACTURE SURGERY Left     ORIF       Family History:   Family History   Problem Relation Age of Onset    Heart disease Mother         afib    Lung disease Mother         interstitial lung disease    Stroke Father         hemmorhagic stroke-aneurysm    Diabetes Brother     Malig Hyperthermia Neg Hx     Colon cancer Neg Hx        Social History:   Social History     Socioeconomic History    Marital status:    Tobacco Use    Smoking status: Never     Passive exposure: Past     Smokeless tobacco: Never   Vaping Use    Vaping status: Never Used   Substance and Sexual Activity    Alcohol use: Never    Drug use: Never    Sexual activity: Defer       Home Medications:  albuterol sulfate HFA, apixaban, carvedilol, cyanocobalamin, dilTIAZem XR, ferrous sulfate, furosemide, glucose blood, losartan, metFORMIN, multivitamin, polyethylene glycol, and predniSONE    Allergies:  Allergies   Allergen Reactions    Penicillins Rash     Mild rash- pt states can take amoxicillin or keflex      Statins Other (See Comments)     Hair loss crestor       Review of Systems   All systems were reviewed and negative except for: Palpitation    Objective   Objective     Vitals:   Temp:  [97.6 °F (36.4 °C)] 97.6 °F (36.4 °C)  Heart Rate:  [] 108  Resp:  [14-17] 17  BP: (100-135)/() 108/66    Physical Exam    Constitutional: Awake, alert, no acute distress   Eyes: Pupils equal, sclerae anicteric, no conjunctival injection   HENT: NCAT, mucous membranes moist   Neck: Supple, no thyromegaly, no lymphadenopathy, trachea midline   Respiratory: Clear to auscultation bilaterally, nonlabored respirations    Cardiovascular: Irregularly irregular   Gastrointestinal: Positive bowel sounds, soft, nontender, nondistended   Musculoskeletal: No bilateral ankle edema, no clubbing or cyanosis to extremities   Psychiatric: Appropriate affect, cooperative   Neurologic: Oriented x 3, strength symmetric in all extremities, Cranial Nerves grossly intact to confrontation, speech clear   Skin: Recent shingles    Result Review    Result Review:  I have personally reviewed the results from the time of this admission to 9/13/2024 15:58 EDT and agree with these findings:  [x]  Laboratory  [x]  Microbiology  [x]  Radiology  [x]  EKG/Telemetry   [x]  Cardiology/Vascular   [x]  Pathology  [x]  Old records  []  Other:      Assessment & Plan   Assessment / Plan   #1  Longstanding persistent A-fib with RVR high [id number] score for age,  hypertension, sex, diabetes.   -Wean off Cardizem drip.  Give digoxin 1 time.  Continue Coreg, Cardizem, Eliquis,  -Cardiology consulted.  Central cardiology consulted.  Patient follows with Dr. Suazo outpatient.  --Replete potassium to 4; keep mag at 2.  Check TSH.  -Will get echo since last 1 was in 2020.  Most recent echo shows preserved function.    #2 JOVANNY  -Baseline creatinine 0.63.  -Gentle hydration.    #3 leukocytosis secondary to being on steroids for shingles  -Isolation ordered.    #4 non-insulin-dependent diabetes cover with insulin sliding scale hold metformin.    #5 lactic acidosis could be secondary to metformin.    VTE Prophylaxis:  Pharmacologic VTE prophylaxis orders are present.        CODE STATUS:    Level Of Support Discussed With: Patient  Code Status (Patient has no pulse and is not breathing): CPR (Attempt to Resuscitate)  Medical Interventions (Patient has pulse or is breathing): Full Support      Admission Status:  I believe this patient meets observation status.    Electronically signed by Carl Mederos DO, 09/13/24, 3:53 PM EDT.

## 2024-09-14 ENCOUNTER — READMISSION MANAGEMENT (OUTPATIENT)
Dept: CALL CENTER | Facility: HOSPITAL | Age: 79
End: 2024-09-14
Payer: MEDICARE

## 2024-09-14 VITALS
WEIGHT: 138.01 LBS | SYSTOLIC BLOOD PRESSURE: 135 MMHG | BODY MASS INDEX: 23.56 KG/M2 | RESPIRATION RATE: 20 BRPM | HEIGHT: 64 IN | TEMPERATURE: 97.9 F | DIASTOLIC BLOOD PRESSURE: 88 MMHG | OXYGEN SATURATION: 99 % | HEART RATE: 94 BPM

## 2024-09-14 PROBLEM — I48.91 ATRIAL FIBRILLATION WITH RVR: Status: RESOLVED | Noted: 2024-09-13 | Resolved: 2024-09-14

## 2024-09-14 LAB
ANION GAP SERPL CALCULATED.3IONS-SCNC: 8.8 MMOL/L (ref 5–15)
BASOPHILS # BLD AUTO: 0.02 10*3/MM3 (ref 0–0.2)
BASOPHILS NFR BLD AUTO: 0.2 % (ref 0–1.5)
BH CV ECHO MEAS - AO MAX PG: 13.2 MMHG
BH CV ECHO MEAS - AO MEAN PG: 7.2 MMHG
BH CV ECHO MEAS - AO ROOT DIAM: 3.1 CM
BH CV ECHO MEAS - AO V2 MAX: 181.9 CM/SEC
BH CV ECHO MEAS - AO V2 VTI: 29.9 CM
BH CV ECHO MEAS - AVA(I,D): 2.07 CM2
BH CV ECHO MEAS - EDV(CUBED): 53.9 ML
BH CV ECHO MEAS - EDV(MOD-SP2): 34.1 ML
BH CV ECHO MEAS - EDV(MOD-SP4): 36.3 ML
BH CV ECHO MEAS - EF(MOD-BP): 56.3 %
BH CV ECHO MEAS - EF(MOD-SP2): 62.2 %
BH CV ECHO MEAS - EF(MOD-SP4): 54.8 %
BH CV ECHO MEAS - ESV(CUBED): 16.9 ML
BH CV ECHO MEAS - ESV(MOD-SP2): 12.9 ML
BH CV ECHO MEAS - ESV(MOD-SP4): 16.4 ML
BH CV ECHO MEAS - FS: 32.1 %
BH CV ECHO MEAS - IVS/LVPW: 0.98 CM
BH CV ECHO MEAS - IVSD: 1.1 CM
BH CV ECHO MEAS - LA DIMENSION: 3 CM
BH CV ECHO MEAS - LAT PEAK E' VEL: 7.8 CM/SEC
BH CV ECHO MEAS - LV MASS(C)D: 135.5 GRAMS
BH CV ECHO MEAS - LV MAX PG: 4.5 MMHG
BH CV ECHO MEAS - LV MEAN PG: 2.4 MMHG
BH CV ECHO MEAS - LV V1 MAX: 105.9 CM/SEC
BH CV ECHO MEAS - LV V1 VTI: 19.7 CM
BH CV ECHO MEAS - LVIDD: 3.8 CM
BH CV ECHO MEAS - LVIDS: 2.6 CM
BH CV ECHO MEAS - LVOT AREA: 3.1 CM2
BH CV ECHO MEAS - LVOT DIAM: 2 CM
BH CV ECHO MEAS - LVPWD: 1.12 CM
BH CV ECHO MEAS - MED PEAK E' VEL: 8.8 CM/SEC
BH CV ECHO MEAS - MV A MAX VEL: 55.7 CM/SEC
BH CV ECHO MEAS - MV DEC SLOPE: 624.3 CM/SEC2
BH CV ECHO MEAS - MV DEC TIME: 0.16 SEC
BH CV ECHO MEAS - MV E MAX VEL: 102 CM/SEC
BH CV ECHO MEAS - MV E/A: 1.83
BH CV ECHO MEAS - RVDD: 2.21 CM
BH CV ECHO MEAS - SV(LVOT): 62 ML
BH CV ECHO MEAS - SV(MOD-SP2): 21.2 ML
BH CV ECHO MEAS - SV(MOD-SP4): 19.9 ML
BH CV ECHO MEASUREMENTS AVERAGE E/E' RATIO: 12.29
BUN SERPL-MCNC: 29 MG/DL (ref 8–23)
BUN/CREAT SERPL: 40.3 (ref 7–25)
CALCIUM SPEC-SCNC: 8.6 MG/DL (ref 8.6–10.5)
CHLORIDE SERPL-SCNC: 107 MMOL/L (ref 98–107)
CO2 SERPL-SCNC: 22.2 MMOL/L (ref 22–29)
CREAT SERPL-MCNC: 0.72 MG/DL (ref 0.57–1)
D-LACTATE SERPL-SCNC: 1.1 MMOL/L (ref 0.5–2)
DEPRECATED RDW RBC AUTO: 53.4 FL (ref 37–54)
EGFRCR SERPLBLD CKD-EPI 2021: 84.1 ML/MIN/1.73
EOSINOPHIL # BLD AUTO: 0.04 10*3/MM3 (ref 0–0.4)
EOSINOPHIL NFR BLD AUTO: 0.4 % (ref 0.3–6.2)
ERYTHROCYTE [DISTWIDTH] IN BLOOD BY AUTOMATED COUNT: 14.6 % (ref 12.3–15.4)
GLUCOSE BLDC GLUCOMTR-MCNC: 127 MG/DL (ref 70–99)
GLUCOSE BLDC GLUCOMTR-MCNC: 132 MG/DL (ref 70–99)
GLUCOSE SERPL-MCNC: 118 MG/DL (ref 65–99)
HCT VFR BLD AUTO: 40.1 % (ref 34–46.6)
HGB BLD-MCNC: 13.3 G/DL (ref 12–15.9)
IMM GRANULOCYTES # BLD AUTO: 0.12 10*3/MM3 (ref 0–0.05)
IMM GRANULOCYTES NFR BLD AUTO: 1.2 % (ref 0–0.5)
LEFT ATRIUM VOLUME INDEX: 14.3 ML/M2
LYMPHOCYTES # BLD AUTO: 2.71 10*3/MM3 (ref 0.7–3.1)
LYMPHOCYTES NFR BLD AUTO: 27.7 % (ref 19.6–45.3)
MAGNESIUM SERPL-MCNC: 2.4 MG/DL (ref 1.6–2.4)
MCH RBC QN AUTO: 33.1 PG (ref 26.6–33)
MCHC RBC AUTO-ENTMCNC: 33.2 G/DL (ref 31.5–35.7)
MCV RBC AUTO: 99.8 FL (ref 79–97)
MONOCYTES # BLD AUTO: 0.84 10*3/MM3 (ref 0.1–0.9)
MONOCYTES NFR BLD AUTO: 8.6 % (ref 5–12)
NEUTROPHILS NFR BLD AUTO: 6.07 10*3/MM3 (ref 1.7–7)
NEUTROPHILS NFR BLD AUTO: 61.9 % (ref 42.7–76)
NRBC BLD AUTO-RTO: 0 /100 WBC (ref 0–0.2)
PHOSPHATE SERPL-MCNC: 3.2 MG/DL (ref 2.5–4.5)
PLATELET # BLD AUTO: 263 10*3/MM3 (ref 140–450)
PMV BLD AUTO: 10 FL (ref 6–12)
POTASSIUM SERPL-SCNC: 4.2 MMOL/L (ref 3.5–5.2)
RBC # BLD AUTO: 4.02 10*6/MM3 (ref 3.77–5.28)
SODIUM SERPL-SCNC: 138 MMOL/L (ref 136–145)
WBC NRBC COR # BLD AUTO: 9.8 10*3/MM3 (ref 3.4–10.8)

## 2024-09-14 PROCEDURE — 83735 ASSAY OF MAGNESIUM: CPT | Performed by: HOSPITALIST

## 2024-09-14 PROCEDURE — 96366 THER/PROPH/DIAG IV INF ADDON: CPT

## 2024-09-14 PROCEDURE — 85025 COMPLETE CBC W/AUTO DIFF WBC: CPT | Performed by: HOSPITALIST

## 2024-09-14 PROCEDURE — 84100 ASSAY OF PHOSPHORUS: CPT | Performed by: HOSPITALIST

## 2024-09-14 PROCEDURE — 99239 HOSP IP/OBS DSCHRG MGMT >30: CPT | Performed by: FAMILY MEDICINE

## 2024-09-14 PROCEDURE — 82948 REAGENT STRIP/BLOOD GLUCOSE: CPT

## 2024-09-14 PROCEDURE — 82948 REAGENT STRIP/BLOOD GLUCOSE: CPT | Performed by: HOSPITALIST

## 2024-09-14 PROCEDURE — 99222 1ST HOSP IP/OBS MODERATE 55: CPT | Performed by: STUDENT IN AN ORGANIZED HEALTH CARE EDUCATION/TRAINING PROGRAM

## 2024-09-14 PROCEDURE — G0378 HOSPITAL OBSERVATION PER HR: HCPCS

## 2024-09-14 PROCEDURE — 80048 BASIC METABOLIC PNL TOTAL CA: CPT | Performed by: HOSPITALIST

## 2024-09-14 PROCEDURE — 83605 ASSAY OF LACTIC ACID: CPT | Performed by: EMERGENCY MEDICINE

## 2024-09-14 RX ORDER — HYDROCODONE BITARTRATE AND ACETAMINOPHEN 5; 325 MG/1; MG/1
1 TABLET ORAL ONCE AS NEEDED
Status: DISCONTINUED | OUTPATIENT
Start: 2024-09-14 | End: 2024-09-14 | Stop reason: HOSPADM

## 2024-09-14 RX ORDER — CARVEDILOL 12.5 MG/1
12.5 TABLET ORAL 2 TIMES DAILY WITH MEALS
Qty: 60 TABLET | Refills: 0 | Status: SHIPPED | OUTPATIENT
Start: 2024-09-14 | End: 2024-10-14

## 2024-09-14 RX ORDER — CARVEDILOL 12.5 MG/1
12.5 TABLET ORAL 2 TIMES DAILY WITH MEALS
Status: DISCONTINUED | OUTPATIENT
Start: 2024-09-14 | End: 2024-09-14 | Stop reason: HOSPADM

## 2024-09-14 RX ORDER — DILTIAZEM HYDROCHLORIDE 120 MG/1
240 CAPSULE, COATED, EXTENDED RELEASE ORAL
Status: DISCONTINUED | OUTPATIENT
Start: 2024-09-14 | End: 2024-09-14 | Stop reason: HOSPADM

## 2024-09-14 RX ADMIN — SENNOSIDES AND DOCUSATE SODIUM 2 TABLET: 50; 8.6 TABLET ORAL at 08:02

## 2024-09-14 RX ADMIN — DILTIAZEM HYDROCHLORIDE 5 MG/HR: 5 INJECTION INTRAVENOUS at 00:14

## 2024-09-14 RX ADMIN — Medication 10 ML: at 08:02

## 2024-09-14 RX ADMIN — DILTIAZEM HYDROCHLORIDE 240 MG: 120 CAPSULE, EXTENDED RELEASE ORAL at 10:15

## 2024-09-14 RX ADMIN — FERROUS SULFATE TAB 325 MG (65 MG ELEMENTAL FE) 325 MG: 325 (65 FE) TAB at 07:53

## 2024-09-14 RX ADMIN — HYDROCODONE BITARTRATE AND ACETAMINOPHEN 1 TABLET: 5; 325 TABLET ORAL at 10:15

## 2024-09-14 RX ADMIN — HYDROCODONE BITARTRATE AND ACETAMINOPHEN 1 TABLET: 5; 325 TABLET ORAL at 06:14

## 2024-09-14 RX ADMIN — APIXABAN 5 MG: 5 TABLET, FILM COATED ORAL at 08:02

## 2024-09-14 RX ADMIN — CARVEDILOL 12.5 MG: 12.5 TABLET, FILM COATED ORAL at 08:02

## 2024-09-14 NOTE — SIGNIFICANT NOTE
09/14/24 1313   Physical Therapy Time and Intention   Session Not Performed patient/family declined evaluation  (pt declined stating she was getting discharged today)

## 2024-09-14 NOTE — NURSING NOTE
Patient discharged home with daughter.  Ivs removed, tip intact and dressing applied.  Telemetry removed and returned to monitor room.  Patient verbalized understanding of discharge instructions.  Patient escorted to lobby via wheelchair.

## 2024-09-14 NOTE — CONSULTS
Cardiology Consult Note  [address]          Patient Identification:  Ely Coats      [id number]  79 y.o.        female  1945           Reason for Consultation:        PCP: Julius Tijerina MD    History of Present Illness:     Ms. Ely Coats is a 79-year-old female who has a past medical history of atrial fibrillation established with Dr. Suazo was brought to ER due to palpitations associated with lightheadedness.  In the ER patient was noted to be in atrial fibrillation with heart rates in 140s.  She was started on medical management and transferred to the floor for further care.    In brief patient describes that she was at work where she started having palpitations all of a sudden, she tried to put ice in her mouth to slow down her heart rate it was not really helpful therefore brought to ER for evaluation.  She does not report chest discomfort, shortness of breath, orthopnea, PND or peripheral edema.  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  She does not report smoking, drinking or doing illicit drug use.  She is currently on treatment for shingles.    Past History:  Past Medical History:   Diagnosis Date    Atrial fibrillation with RVR     FOLLOWS W/LINK, NO RECENT ISSUES, NO CP    Calculus of gallbladder with acute on chronic cholecystitis with obstruction 05/09/2022    Cancer     SKIN CANCER REMOVED    Colon polyp     Contusion of face     Diabetes mellitus     BS IN  THE A.M. 120 ON AVERAGE    Essential hypertension     CONT W/MEDS    Fall     Gallstones     Hyperlipidemia     Kidney stone     Osteoarthritis     Osteopenia     Renal insufficiency     due to nsaids (8/2017 states labs wnl) NO CURRENT PROBLEMS    Snoring     NO CPAP    Wound infection after surgery 05/09/2022     Past Surgical History:   Procedure Laterality Date    BLEPHAROPLASTY  Bilateral     CARDIAC CATHETERIZATION Left 2011    [facility], Dr. Cooper Ramires, NO INTERVENTION    CHOLECYSTECTOMY N/A 5/4/2022    Procedure: CHOLECYSTECTOMY LAPAROSCOPIC;  Surgeon: Josue Jernigan MD;  Location: [facility] MAIN OR;  Service: General;  Laterality: N/A;    COLONOSCOPY  2014    negative    COLONOSCOPY  2011    polyp-repeat 2014    COLONOSCOPY N/A 7/17/2024    Procedure: COLONOSCOPY;  Surgeon: Simba Nuno MD;  Location: [facility] ENDOSCOPY;  Service: Gastroenterology;  Laterality: N/A;  DIVERTICULOSIS    CYSTOSCOPY  09/2016    negative- Dr. Murphy per patient urethral polyp noted    ERCP N/A 04/20/2022    Procedure: ENDOSCOPIC RETROGRADE CHOLANGIOPANCREATOGRAPHY WITH SPHINCTEROTOMY, BALLOON SWEEP, COMMON BILE DUCT STENT PLACED, PANCREATIC DUCT STENT PLACED;  Surgeon: Gurvinder Peryr MD;  Location: [facility] ENDOSCOPY;  Service: Gastroenterology;  Laterality: N/A;  BILE DUCT STONES    ERCP N/A 5/10/2022    Procedure: ENDOSCOPIC RETROGRADE CHOLANGIOPANCREATOGRAPHY WITH STENT REMOVAL X2, SPHINCTEROTOMY, 12-15MM BALLOON SWEEP WITH BILE DUCT STONE REMOVAL;  Surgeon: Gurvinder Perry MD;  Location: [facility] ENDOSCOPY;  Service: Gastroenterology;  Laterality: N/A;  BILE DUCT STONES    SKIN CANCER EXCISION  04/2016    basal cell on back    TOTAL KNEE ARTHROPLASTY Right 1/17/2024    Procedure: RIGHT TOTAL KNEE ARTHROPLASTY WITH CLARISSA ROBOT;  Surgeon: Geraldo Gann MD;  Location: [facility] MAIN OR;  Service: Robotics - Ortho;  Laterality: Right;    TUBAL ABDOMINAL LIGATION      WRIST FRACTURE SURGERY Left     ORIF     Allergies   Allergen Reactions    Penicillins Rash     Mild rash- pt states can take amoxicillin or keflex      Statins Other (See Comments)     Hair loss crestor     Social History     Socioeconomic History    Marital status:    Tobacco Use    Smoking status: Never     Passive exposure: Past    Smokeless tobacco: Never   Vaping Use    Vaping status: Never Used    Substance and Sexual Activity    Alcohol use: Never    Drug use: Never    Sexual activity: Defer     Family History   Problem Relation Age of Onset    Heart disease Mother         afib    Lung disease Mother         interstitial lung disease    Stroke Father         hemmorhagic stroke-aneurysm    Diabetes Brother     Malig Hyperthermia Neg Hx     Colon cancer Neg Hx        Medications:  Prior to Admission medications    Medication Sig Start Date End Date Taking? Authorizing Provider   carvedilol (COREG) 12.5 MG tablet Take 1 tablet by mouth 2 (Two) Times a Day With Meals for 30 days. 9/14/24 10/14/24 Yes Vivek Hilton MD   carvedilol (COREG) 6.25 MG tablet Take 1 tablet by mouth 2 (Two) Times a Day With Meals. 7/18/22  Yes Zuleyma Hennessy MD   cyanocobalamin (VITAMIN B-12) 1000 MCG tablet Take 0.5 tablets by mouth Daily.   Yes Zuleyma Hennessy MD   dilTIAZem XR (DILACOR XR) 240 MG 24 hr capsule Take 1 capsule by mouth Daily.   Yes Zuleyma Hennessy MD   Eliquis 5 MG tablet tablet TAKE 1 TABLET BY MOUTH TWICE DAILY 8/30/24  Yes Julius Tijerina MD   ferrous sulfate (FeroSul) 325 (65 FE) MG tablet TAKE 1 TABLET BY MOUTH DAILY WITH BREAKFAST. 7/25/24  Yes Julius Tijerina MD   losartan (Cozaar) 25 MG tablet Take 1 tablet by mouth Daily. 9/5/24  Yes Julius Tijerina MD   metFORMIN (GLUCOPHAGE) 500 MG tablet Take 1 tablet by mouth Daily With Breakfast. 6/27/24  Yes Julius Tijerina MD   Multiple Vitamin (MULTIVITAMIN) tablet Take 1 tablet by mouth Daily.   Yes Zuleyma Hennessy MD   Accu-Chek Guide test strip USE 1 STRIP TWICE DAILY    Zuleyma Hennessy MD   albuterol sulfate  (90 Base) MCG/ACT inhaler Inhale 2 puffs Every 4 (Four) Hours As Needed for Wheezing or Shortness of Air. 6/20/24   Julius Tijerina MD   furosemide (LASIX) 20 MG tablet Take 1 tablet by mouth Daily As Needed. Doesn't take every day    Zuleyma Hennessy MD   polyethylene glycol (MIRALAX) 17 GM/SCOOP  "powder Take 17 g by mouth 2 (Two) Times a Day As Needed (constipation). 1/19/24   Mg Mcdaniel MD   predniSONE (DELTASONE) 10 MG tablet Take 40 mg PO (4 tab) daily for days 1-5.  Then, take 30 mg PO (3 tab) daily for 6-8.  Then take 20 mg PO (2 tab) daily for day 9-11.  Then take 10 mg PO (1 tab) for days 12-14.  Patient taking differently: Take 1 tablet by mouth Daily. Take 40 mg PO (4 tab) daily for days 1-5.  Then, take 30 mg PO (3 tab) daily for 6-8.  Then take 20 mg PO (2 tab) daily for day 9-11.  Then take 10 mg PO (1 tab) for days 12-14. 9/5/24   Julius Tijerina MD      Current medications:  apixaban, 5 mg, Oral, BID  carvedilol, 12.5 mg, Oral, BID With Meals  dilTIAZem CD, 240 mg, Oral, Q24H  ferrous sulfate, 325 mg, Oral, Daily With Breakfast  insulin lispro, 2-7 Units, Subcutaneous, 4x Daily AC & at Bedtime  senna-docusate sodium, 2 tablet, Oral, BID  sodium chloride, 10 mL, Intravenous, Q12H      Current IV drips:  dilTIAZem, 5-15 mg/hr, Last Rate: Stopped (09/14/24 0859)  sodium chloride, 75 mL/hr, Last Rate: 75 mL/hr (09/13/24 1619)            Physical Exam    /88 (BP Location: Left arm, Patient Position: Lying)   Pulse 94   Temp 97.9 °F (36.6 °C) (Oral)   Resp 20   Ht 162.6 cm (64\")   Wt 62.6 kg (138 lb 0.1 oz)   SpO2 99%   BMI 23.69 kg/m²  Body mass index is 23.69 kg/m².   Oxygen saturation   @FLOWAN(10::1)@ SpO2  Min: 92 %  Max: 99 %    General Appearance:   no acute distress  Alert and oriented x3  HENT:   lips not cyanotic  Atraumatic  Neck:  thyroid not enlarged  supple  Respiratory:  no respiratory distress  normal breath sounds  no rales  Cardiovascular:  No jugular venous distention, irregularly irregular rhythm, tachycardia   Minimal bilateral pitting edema.    Skin:   Rash present on the right flank      Cardiographics:     ECG  (personally reviewed) atrial fibrillation with RVR   Telemetry:  (personally reviewed) atrial fibrillation with RVR          No results found for " "this or any previous visit.      Cardiolite (Tc-99m Sestamibi) stress test   Lab Review:       CBC          8/18/2024    07:40 9/13/2024    11:59 9/14/2024    03:43   CBC   WBC 6.19  12.76  9.80    RBC 3.96  4.38  4.02    Hemoglobin 12.8  14.5  13.3    Hematocrit 38.0  42.3  40.1    MCV 96.0  96.6  99.8    MCH 32.3  33.1  33.1    MCHC 33.7  34.3  33.2    RDW 13.9  14.5  14.6    Platelets 197  331  263        CMP          8/18/2024    07:40 9/13/2024    11:59 9/14/2024    03:43   CMP   Glucose 134  210  118    BUN 19  19  29    Creatinine 0.78  1.03  0.72    EGFR 76.4  54.7  84.1    Sodium 139  142  138    Potassium 4.2  3.6  4.2    Chloride 102  102  107    Calcium 9.1  9.4  8.6    Total Protein 6.6  6.9     Albumin 3.9  4.0     Globulin 2.7  2.9     Total Bilirubin 0.4  0.5     Alkaline Phosphatase 66  60     AST (SGOT) 15  13     ALT (SGPT) 9  14     Albumin/Globulin Ratio 1.4  1.4     BUN/Creatinine Ratio 24.4  18.4  40.3    Anion Gap 9.6  16.7  8.8         CARDIAC LABS:      Lab 09/13/24  1159   PROBNP 6,440.0*   HSTROP T 20*      No results found for: \"DIGOXIN\"   Lab Results   Component Value Date    TSH 0.923 09/13/2024           Invalid input(s): \"LDLCALC\"  Lab Results   Component Value Date    POCTROP 0.01 07/15/2020     No results found for: \"DDIMERQUAN\"  Lab Results   Component Value Date    MG 2.4 09/14/2024             CARDIAC LABS:      Lab 09/13/24  1159   PROBNP 6,440.0*   HSTROP T 20*         Assessment:  Symptomatic paroxysmal atrial fibrillation  Hypertension      Recommendations  Patient needs rhythm management strategy to alleviate symptoms or strict rate control.  Outpatient follow-up with Dr. Suazo for consideration of rhythm management strategies.  For now, Coreg dose was increased to 12.5 mg p.o. twice daily and diltiazem was increased to 240 mg p.o. daily by the primary team which has led to appropriate rate control.  Continue home medications for hypertension.  Blood pressure is " appropriately controlled.  As long as patient is not feeling dizzy or lightheaded and is able to ambulate without difficulty.  She can be discharged home.  Continue Eliquis 5 mg p.o. twice daily for annual stroke risk reduction patient's [id number] score is 3.  According to  guidelines sex is no longer a risk factor.    Outpatient evaluation for diastolic heart failure is recommended.  Leg swelling is probably related to diltiazem.        Thank you for allowing us to share in [facility]. Please call with any questions or concerns.       Roshan Smallwood MD   9/14/2024    12:53 EDT

## 2024-09-14 NOTE — PLAN OF CARE
Goal Outcome Evaluation:  Plan of Care Reviewed With: patient        Progress: no change       Cardizem decreased to 2.5mg/hr, no other events noted this shift.

## 2024-09-14 NOTE — DISCHARGE SUMMARY
[facility]         HOSPITALIST  DISCHARGE SUMMARY    Patient Name: Ely Coats  : 1945  MRN: [id number]    Date of Admission: 2024  Date of Discharge:  2024    Primary Care Physician: Julius Tijerina MD    Consults       Date and Time Order Name Status Description    2024  3:50 PM Inpatient Cardiology Consult      2024  1:41 PM Inpatient Hospitalist Consult              Active and Resolved Hospital Problems:  Active Hospital Problems    Diagnosis POA    **A-fib [I48.91] Yes      Resolved Hospital Problems    Diagnosis POA    Atrial fibrillation with RVR [I48.91] Yes       Hospital Course     Hospital Course:    79-year-old female with history of atrial fibrillation, diabetes, hypertension, dyslipidemia, recently diagnosed with shingles, placed on steroids, hospitalized on 2024 for chief complaint of palpitations and tachycardia, found to be in A-fib with RVR, placed on a Cardizem drip, patient's heart rate improved, wean down Cardizem drip and discontinue Cardizem drip, resumed on Coreg dose doubled, resumed on oral Cardizem, patient adamant about discharge home on 2024, discharged in hemodynamically stable addition on 2024 with increased dose of Coreg, to follow-up with primary cardiologist within 1 to 2 weeks.          Day of Discharge     Vital Signs:  Temp:  [97.3 °F (36.3 °C)-98.6 °F (37 °C)] 97.9 °F (36.6 °C)  Heart Rate:  [] 94  Resp:  [12-20] 20  BP: ()/() 135/88  Review of systems:  All systems reviewed negative except for weakness, fatigue    Physical Exam                         Constitutional: Awake, alert, no acute distress              Eyes: Pupils equal, sclerae anicteric, no conjunctival injection              HENT: NCAT, mucous membranes moist              Neck: Supple, no thyromegaly, no lymphadenopathy, trachea midline              Respiratory: Clear to auscultation bilaterally, nonlabored respirations                Cardiovascular: Irregularly irregular              Gastrointestinal: Positive bowel sounds, soft, nontender, nondistended              Musculoskeletal: No bilateral ankle edema, no clubbing or cyanosis to extremities              Psychiatric: Appropriate affect, cooperative              Neurologic: Oriented x 3, strength symmetric in all extremities, Cranial Nerves grossly intact to confrontation, speech clear              Skin: Recent shingles         Discharge Details        Discharge Medications        Changes to Medications        Instructions Start Date   carvedilol 12.5 MG tablet  Commonly known as: COREG  What changed:   medication strength  how much to take   12.5 mg, Oral, 2 Times Daily With Meals             Continue These Medications        Instructions Start Date   Accu-Chek Guide test strip  Generic drug: glucose blood   USE 1 STRIP TWICE DAILY      albuterol sulfate  (90 Base) MCG/ACT inhaler  Commonly known as: PROVENTIL HFA;VENTOLIN HFA;PROAIR HFA   2 puffs, Inhalation, Every 4 Hours PRN      cyanocobalamin 1000 MCG tablet  Commonly known as: VITAMIN B-12   500 mcg, Oral, Daily      dilTIAZem  MG 24 hr capsule  Commonly known as: DILACOR XR   240 mg, Oral, Daily      Eliquis 5 MG tablet tablet  Generic drug: apixaban   5 mg, Oral, 2 Times Daily      FeroSul 325 (65 Fe) MG tablet  Generic drug: ferrous sulfate   325 mg, Oral, Daily With Breakfast      furosemide 20 MG tablet  Commonly known as: LASIX   20 mg, Oral, Daily PRN, Doesn't take every day      losartan 25 MG tablet  Commonly known as: Cozaar   25 mg, Oral, Daily      metFORMIN 500 MG tablet  Commonly known as: GLUCOPHAGE   500 mg, Oral, Daily With Breakfast      multivitamin tablet  Generic drug: multivitamin   1 tablet, Oral, Daily      polyethylene glycol 17 GM/SCOOP powder  Commonly known as: MIRALAX   17 g, Oral, 2 Times Daily PRN             Stop These Medications      predniSONE 10 MG tablet  Commonly known as:  DELTASONE              Allergies   Allergen Reactions    Penicillins Rash     Mild rash- pt states can take amoxicillin or keflex      Statins Other (See Comments)     Hair loss crestor       Discharge Disposition:  Home or Self Care    Diet:  Hospital:  Diet Order   Procedures    Diet: Regular/House; Texture: Regular (IDDSI 7); Fluid Consistency: Thin (IDDSI 0)       Discharge Activity: as tolerates      CODE STATUS:  Code Status and Medical Interventions: CPR (Attempt to Resuscitate); Full Support   Ordered at: 09/13/24 2788     Level Of Support Discussed With:    Patient     Code Status (Patient has no pulse and is not breathing):    CPR (Attempt to Resuscitate)     Medical Interventions (Patient has pulse or is breathing):    Full Support         Future Appointments   Date Time Provider Department Center   9/23/2024 10:15 AM Julius Tijerina MD [facility] IMP ETWN PHI   6/27/2025  9:30 AM ERIKA Suazo MD [facility] CD EDIXE PHI       Additional Instructions for the Follow-ups that You Need to Schedule       Discharge Follow-up with PCP   As directed       Currently Documented PCP:    Julius Tijerina MD    PCP Phone Number:    [phone]     Follow Up Details: 3 to 7 days                Pertinent  and/or Most Recent Results     PROCEDURES:   XR Chest 1 View    Result Date: 9/13/2024  XR CHEST 1 VW Date of Exam: 9/13/2024 12:05 PM EDT Indication: Dysrhythmia Triage Protocol Comparison: Chest radiograph 6/19/2024. Findings: Enlarged cardiac silhouette, unchanged. Mild chronic/senescent changes of the lungs. No focal consolidation. No pleural effusion or pneumothorax. Osseous structures are unchanged.     Impression: No acute cardiopulmonary findings. Electronically Signed: Efraín Easton MD  9/13/2024 12:21 PM EDT  Workstation ID: [id number]    CT Abdomen Pelvis Without Contrast    Result Date: 8/18/2024  CT ABDOMEN PELVIS WO CONTRAST Date of Exam: 8/18/2024 8:41 AM EDT Indication: right sided abdominal pain. Comparison: CT  abdomen/pelvis 4/19/2022 Technique: Axial CT images were obtained of the abdomen and pelvis without the administration of contrast. Reconstructed coronal and sagittal images were also obtained. Automated exposure control and iterative construction methods were used. Findings: The lung bases are clear. A small hiatal hernia is evident. The liver is of normal size. The gallbladder is absent, surgical clips are seen in the gallbladder bed. No pancreatic or adrenal mass is evident. The spleen is of normal size. No renal or ureteral stones are seen. There is no evidence of hydronephrosis. The urinary bladder is not abnormally distended. The uterus measures 7 cm in greatest transverse dimension. No pelvic mass is seen. The stomach is not abnormally distended. Bowel loops are of normal caliber. Pan colonic diverticulosis is evident. The appendix is normal. 2 cm left paramedian ventral hernia defect is seen in the epigastric region on series 201 image 55, not evident on the prior study. Abdominal fat herniating through the defect measures 3 cm. Degenerative changes are seen in the lower thoracic and lumbar spine.     Impression: CT scan of the abdomen and pelvis without contrast demonstrating previous cholecystectomy. Pancolonic diverticulosis without diverticulitis. Electronically Signed: Kapil Long MD  8/18/2024 9:14 AM EDT  Workstation ID: [id number]       LAB RESULTS:      Lab 09/14/24  0343 09/13/24  2153 09/13/24  1820 09/13/24  1522 09/13/24  1226 09/13/24  1159   WBC 9.80  --   --   --   --  12.76*   HEMOGLOBIN 13.3  --   --   --   --  14.5   HEMATOCRIT 40.1  --   --   --   --  42.3   PLATELETS 263  --   --   --   --  331   NEUTROS ABS 6.07  --   --   --   --  10.38*   IMMATURE GRANS (ABS) 0.12*  --   --   --   --  0.19*   LYMPHS ABS 2.71  --   --   --   --  1.76   MONOS ABS 0.84  --   --   --   --  0.38   EOS ABS 0.04  --   --   --   --  0.02   MCV 99.8*  --   --   --   --  96.6   LACTATE 1.1 2.1* 4.7* 2.5* 3.7*   --          Lab 09/14/24  0343 09/13/24  1820 09/13/24  1159   SODIUM 138  --  142   POTASSIUM 4.2  --  3.6   CHLORIDE 107  --  102   CO2 22.2  --  23.3   ANION GAP 8.8  --  16.7*   BUN 29*  --  19   CREATININE 0.72  --  1.03*   EGFR 84.1  --  54.7*   GLUCOSE 118*  --  210*   CALCIUM 8.6  --  9.4   MAGNESIUM 2.4 2.9* 1.8   PHOSPHORUS 3.2 4.0  --    TSH  --  0.923 2.060         Lab 09/13/24  1159   TOTAL PROTEIN 6.9   ALBUMIN 4.0   GLOBULIN 2.9   ALT (SGPT) 14   AST (SGOT) 13   BILIRUBIN 0.5   ALK PHOS 60         Lab 09/13/24  1159   PROBNP 6,440.0*   HSTROP T 20*                 Brief Urine Lab Results  (Last result in the past 365 days)        Color   Clarity   Blood   Leuk Est   Nitrite   Protein   CREAT   Urine HCG        08/18/24 0750 Dark Yellow   Clear   Small (1+)   Small (1+)   Negative   Trace                 Microbiology Results (last 10 days)       ** No results found for the last 240 hours. **            XR Chest 1 View    Result Date: 9/13/2024  Impression: Impression: No acute cardiopulmonary findings. Electronically Signed: Efraín Easton MD  9/13/2024 12:21 PM EDT  Workstation ID: [id number]    CT Abdomen Pelvis Without Contrast    Result Date: 8/18/2024  Impression: Impression: CT scan of the abdomen and pelvis without contrast demonstrating previous cholecystectomy. Pancolonic diverticulosis without diverticulitis. Electronically Signed: Kapil Long MD  8/18/2024 9:14 AM EDT  Workstation ID: [id number]                 Labs Pending at Discharge:        Time spent on Discharge including face to face service:  35 minutes    Electronically signed by Vivek Hilton MD, 09/14/24, 12:46 PM EDT.    Portions of this documentation were transcribed electronically from a voice recognition software.  I confirm all data accurately represents the service(s) I performed at today's visit.

## 2024-09-16 ENCOUNTER — TRANSITIONAL CARE MANAGEMENT TELEPHONE ENCOUNTER (OUTPATIENT)
Dept: CALL CENTER | Facility: HOSPITAL | Age: 79
End: 2024-09-16
Payer: MEDICARE

## 2024-09-17 ENCOUNTER — OFFICE VISIT (OUTPATIENT)
Dept: INTERNAL MEDICINE | Facility: CLINIC | Age: 79
End: 2024-09-17
Payer: MEDICARE

## 2024-09-17 VITALS
BODY MASS INDEX: 25.71 KG/M2 | HEIGHT: 64 IN | WEIGHT: 150.6 LBS | DIASTOLIC BLOOD PRESSURE: 74 MMHG | HEART RATE: 63 BPM | SYSTOLIC BLOOD PRESSURE: 124 MMHG | TEMPERATURE: 97.8 F | OXYGEN SATURATION: 98 %

## 2024-09-17 DIAGNOSIS — Z12.31 BREAST CANCER SCREENING BY MAMMOGRAM: ICD-10-CM

## 2024-09-17 DIAGNOSIS — Z23 NEED FOR INFLUENZA VACCINATION: ICD-10-CM

## 2024-09-17 DIAGNOSIS — Z79.899 ENCOUNTER FOR LONG-TERM (CURRENT) USE OF OTHER MEDICATIONS: ICD-10-CM

## 2024-09-17 DIAGNOSIS — E11.65 TYPE 2 DIABETES MELLITUS WITH HYPERGLYCEMIA, UNSPECIFIED WHETHER LONG TERM INSULIN USE: ICD-10-CM

## 2024-09-17 DIAGNOSIS — B02.9 HERPES ZOSTER WITHOUT COMPLICATION: ICD-10-CM

## 2024-09-17 DIAGNOSIS — I10 PRIMARY HYPERTENSION: ICD-10-CM

## 2024-09-17 DIAGNOSIS — Z78.0 POSTMENOPAUSAL: ICD-10-CM

## 2024-09-17 DIAGNOSIS — I48.0 PAROXYSMAL ATRIAL FIBRILLATION: Primary | Chronic | ICD-10-CM

## 2024-09-17 LAB
ALBUMIN UR-MCNC: 3.5 MG/DL
AMPHET+METHAMPHET UR QL: NEGATIVE
AMPHETAMINE INTERNAL CONTROL: NORMAL
AMPHETAMINES UR QL: NEGATIVE
BARBITURATE INTERNAL CONTROL: NORMAL
BARBITURATES UR QL SCN: NEGATIVE
BENZODIAZ UR QL SCN: NEGATIVE
BENZODIAZEPINE INTERNAL CONTROL: NORMAL
BUPRENORPHINE INTERNAL CONTROL: NORMAL
BUPRENORPHINE SERPL-MCNC: NEGATIVE NG/ML
CANNABINOIDS SERPL QL: NEGATIVE
COCAINE INTERNAL CONTROL: NORMAL
COCAINE UR QL: NEGATIVE
CREAT UR-MCNC: 169.4 MG/DL
EXPIRATION DATE: 0
Lab: 0
MDMA (ECSTASY) INTERNAL CONTROL: NORMAL
MDMA UR QL SCN: NEGATIVE
METHADONE INTERNAL CONTROL: NORMAL
METHADONE UR QL SCN: NEGATIVE
METHAMPHETAMINE INTERNAL CONTROL: NORMAL
MICROALBUMIN/CREAT UR: 20.7 MG/G (ref 0–29)
MORPHINE INTERNAL CONTROL: NORMAL
MORPHINE/OPIATES SCREEN, URINE: NEGATIVE
OXYCODONE INTERNAL CONTROL: NORMAL
OXYCODONE UR QL SCN: NEGATIVE
PCP UR QL SCN: NEGATIVE
PHENCYCLIDINE INTERNAL CONTROL: NORMAL
THC INTERNAL CONTROL: NORMAL

## 2024-09-17 PROCEDURE — 1111F DSCHRG MED/CURRENT MED MERGE: CPT | Performed by: INTERNAL MEDICINE

## 2024-09-17 PROCEDURE — 99495 TRANSJ CARE MGMT MOD F2F 14D: CPT | Performed by: INTERNAL MEDICINE

## 2024-09-17 PROCEDURE — G0008 ADMIN INFLUENZA VIRUS VAC: HCPCS | Performed by: INTERNAL MEDICINE

## 2024-09-17 PROCEDURE — 3074F SYST BP LT 130 MM HG: CPT | Performed by: INTERNAL MEDICINE

## 2024-09-17 PROCEDURE — 3078F DIAST BP <80 MM HG: CPT | Performed by: INTERNAL MEDICINE

## 2024-09-17 PROCEDURE — 90662 IIV NO PRSV INCREASED AG IM: CPT | Performed by: INTERNAL MEDICINE

## 2024-09-17 PROCEDURE — 82043 UR ALBUMIN QUANTITATIVE: CPT | Performed by: INTERNAL MEDICINE

## 2024-09-17 PROCEDURE — 1126F AMNT PAIN NOTED NONE PRSNT: CPT | Performed by: INTERNAL MEDICINE

## 2024-09-17 PROCEDURE — 82570 ASSAY OF URINE CREATININE: CPT | Performed by: INTERNAL MEDICINE

## 2024-09-17 RX ORDER — GABAPENTIN 300 MG/1
300 CAPSULE ORAL 3 TIMES DAILY PRN
Qty: 30 CAPSULE | Refills: 0 | Status: SHIPPED | OUTPATIENT
Start: 2024-09-17

## 2024-09-19 LAB
QT INTERVAL: 295 MS
QTC INTERVAL: 459 MS

## 2024-10-08 ENCOUNTER — OFFICE VISIT (OUTPATIENT)
Dept: ORTHOPEDIC SURGERY | Facility: CLINIC | Age: 79
End: 2024-10-08
Payer: MEDICARE

## 2024-10-08 VITALS
OXYGEN SATURATION: 96 % | BODY MASS INDEX: 25.27 KG/M2 | WEIGHT: 148 LBS | SYSTOLIC BLOOD PRESSURE: 134 MMHG | HEIGHT: 64 IN | DIASTOLIC BLOOD PRESSURE: 81 MMHG | HEART RATE: 70 BPM

## 2024-10-08 DIAGNOSIS — Z47.89 AFTERCARE FOLLOWING SURGERY OF THE MUSCULOSKELETAL SYSTEM: Primary | ICD-10-CM

## 2024-10-08 NOTE — PROGRESS NOTES
"Chief Complaint  Follow-up of the Right Knee    Subjective          History of Present Illness      Ely Coats is a 79 y.o. female  presents to [facility] for     Patient presents for follow-up evaluation of right total knee arthroplasty 1/17/2024.  She was last seen in April she was to follow-up in July she states she has had the shingles for over a month this has troubled her to not allow her to follow-up.  She states that she started a job recently working at [employer].  She does the samples.  Patient states her knee is getting better she states she is able to go up and down stairs better than she did before.  She points to the lateral knee as an area of pain when she has it.  She has tried creams and Tylenol when she has pain.    Allergies   Allergen Reactions    Penicillins Rash     Mild rash- pt states can take amoxicillin or keflex      Statins Other (See Comments)     Hair loss crestor        Social History     Socioeconomic History    Marital status:    Tobacco Use    Smoking status: Never     Passive exposure: Past    Smokeless tobacco: Never   Vaping Use    Vaping status: Never Used   Substance and Sexual Activity    Alcohol use: Never    Drug use: Never    Sexual activity: Defer        REVIEW OF SYSTEMS    Constitutional: Awake alert and oriented x3, no acute distress, denies fevers, chills, weight loss  Respiratory: No respiratory distress  Vascular: Brisk cap refill, Intact distal pulses, No cyanosis, compartments soft with no signs or symptoms of compartment syndrome or DVT.   Cardiovascular: Denies chest pain, shortness of breath  Skin: Denies rashes, acute skin changes  Neurologic: Denies headache, loss of consciousness  MSK: Right knee pain      Objective   Vital Signs:   /81   Pulse 70   Ht 162.6 cm (64.02\")   Wt 67.1 kg (148 lb)   SpO2 96%   BMI 25.39 kg/m²     Body mass index is 25.39 kg/m².    Physical Exam       Right knee: " Well-healed incision, no erythema, no ecchymosis, no swelling, no effusion, no signs of infection, full extension, flexion 120, stable anterior/posterior drawer, stable to varus/valgus stress.  Nontender to palpation, no pain with range of motion.  5 out of 5 strength, no pain with resisted range of motion      Procedures    Imaging Results (Most Recent)       Procedure Component Value Units Date/Time    XR Knee 3 View Right [[id number]] Resulted: 10/08/24 1319     Updated: 10/08/24 1319    Narrative:      View:AP/Lateral and Sea Bright view(s)  Site: Right knee  Indication: Right knee pain  Study: X-rays ordered, taken in the office, and reviewed today  X-ray: Intact appearing right total knee arthroplasty, no signs of   hardware failure or loosening, no subsidence or periprosthetic fracture,   good alignment  Comparative data: Previous studies             Result Review :   The following data was reviewed by: BROOKE Bryant on 10/08/2024:  Data reviewed : Radiologic studies reviewed by me with the patient              Assessment and Plan    Diagnoses and all orders for this visit:    1. Aftercare following surgery of right total knee arthroplasty 1/17/2024 (Primary)  -     XR Knee 3 View Right        Reviewed x-rays with the patient discussed diagnosis and treatment options with her she was advised to continue weightbearing and activity as tolerated follow-up in 4 months for recheck with x-rays    Call or return if worsening symptoms.    Follow Up   Return in about 4 months (around 2/8/2025).  Patient was given instructions and counseling regarding her condition or for health maintenance advice. Please see specific information pulled into the AVS if appropriate.       EMR Dragon/Transcription disclaimer:  Part of this note may be an electronic transcription/translation of spoken language to printed text using the Dragon Dictation System

## 2024-10-11 ENCOUNTER — OFFICE VISIT (OUTPATIENT)
Dept: INTERNAL MEDICINE | Facility: CLINIC | Age: 79
End: 2024-10-11
Payer: MEDICARE

## 2024-10-11 ENCOUNTER — TELEPHONE (OUTPATIENT)
Dept: INTERNAL MEDICINE | Facility: CLINIC | Age: 79
End: 2024-10-11
Payer: MEDICARE

## 2024-10-11 VITALS
BODY MASS INDEX: 25.33 KG/M2 | HEART RATE: 59 BPM | OXYGEN SATURATION: 97 % | WEIGHT: 148.38 LBS | SYSTOLIC BLOOD PRESSURE: 137 MMHG | TEMPERATURE: 98 F | DIASTOLIC BLOOD PRESSURE: 78 MMHG | HEIGHT: 64 IN

## 2024-10-11 DIAGNOSIS — R10.31 RIGHT LOWER QUADRANT PAIN: Primary | ICD-10-CM

## 2024-10-11 DIAGNOSIS — I10 PRIMARY HYPERTENSION: ICD-10-CM

## 2024-10-11 DIAGNOSIS — N30.01 ACUTE CYSTITIS WITH HEMATURIA: ICD-10-CM

## 2024-10-11 LAB
BILIRUB BLD-MCNC: ABNORMAL MG/DL
BILIRUB UR QL STRIP: NEGATIVE
CLARITY UR: ABNORMAL
CLARITY, POC: ABNORMAL
COLOR UR: YELLOW
COLOR UR: YELLOW
EXPIRATION DATE: ABNORMAL
GLUCOSE UR STRIP-MCNC: NEGATIVE MG/DL
GLUCOSE UR STRIP-MCNC: NEGATIVE MG/DL
HGB UR QL STRIP.AUTO: ABNORMAL
KETONES UR QL STRIP: ABNORMAL
KETONES UR QL: NEGATIVE
LEUKOCYTE EST, POC: ABNORMAL
LEUKOCYTE ESTERASE UR QL STRIP.AUTO: ABNORMAL
Lab: ABNORMAL
NITRITE UR QL STRIP: POSITIVE
NITRITE UR-MCNC: POSITIVE MG/ML
PH UR STRIP.AUTO: 6 [PH] (ref 5–8)
PH UR: 6 [PH] (ref 5–8)
PROT UR QL STRIP: ABNORMAL
PROT UR STRIP-MCNC: ABNORMAL MG/DL
RBC # UR STRIP: ABNORMAL /UL
SP GR UR STRIP: 1.03 (ref 1–1.03)
SP GR UR: 1.02 (ref 1–1.03)
UROBILINOGEN UR QL STRIP: ABNORMAL
UROBILINOGEN UR QL: ABNORMAL

## 2024-10-11 PROCEDURE — 1126F AMNT PAIN NOTED NONE PRSNT: CPT | Performed by: STUDENT IN AN ORGANIZED HEALTH CARE EDUCATION/TRAINING PROGRAM

## 2024-10-11 PROCEDURE — 87086 URINE CULTURE/COLONY COUNT: CPT | Performed by: STUDENT IN AN ORGANIZED HEALTH CARE EDUCATION/TRAINING PROGRAM

## 2024-10-11 PROCEDURE — 87186 SC STD MICRODIL/AGAR DIL: CPT | Performed by: STUDENT IN AN ORGANIZED HEALTH CARE EDUCATION/TRAINING PROGRAM

## 2024-10-11 PROCEDURE — 81003 URINALYSIS AUTO W/O SCOPE: CPT | Performed by: STUDENT IN AN ORGANIZED HEALTH CARE EDUCATION/TRAINING PROGRAM

## 2024-10-11 PROCEDURE — 87077 CULTURE AEROBIC IDENTIFY: CPT | Performed by: STUDENT IN AN ORGANIZED HEALTH CARE EDUCATION/TRAINING PROGRAM

## 2024-10-11 PROCEDURE — 81001 URINALYSIS AUTO W/SCOPE: CPT | Performed by: STUDENT IN AN ORGANIZED HEALTH CARE EDUCATION/TRAINING PROGRAM

## 2024-10-11 PROCEDURE — 3075F SYST BP GE 130 - 139MM HG: CPT | Performed by: STUDENT IN AN ORGANIZED HEALTH CARE EDUCATION/TRAINING PROGRAM

## 2024-10-11 PROCEDURE — 3078F DIAST BP <80 MM HG: CPT | Performed by: STUDENT IN AN ORGANIZED HEALTH CARE EDUCATION/TRAINING PROGRAM

## 2024-10-11 PROCEDURE — 99213 OFFICE O/P EST LOW 20 MIN: CPT | Performed by: STUDENT IN AN ORGANIZED HEALTH CARE EDUCATION/TRAINING PROGRAM

## 2024-10-11 RX ORDER — NITROFURANTOIN 25; 75 MG/1; MG/1
100 CAPSULE ORAL 2 TIMES DAILY
Qty: 10 CAPSULE | Refills: 0 | Status: SHIPPED | OUTPATIENT
Start: 2024-10-11 | End: 2024-10-16

## 2024-10-11 NOTE — TELEPHONE ENCOUNTER
Caller: Ely Dee    Relationship to patient: Self    Best call back number: [phone]    Chief complaint: RIGHT SIDE IS IN PAIN     Type of visit: OFFICE VISIT / SAME DAY     Requested date: AS SOON AS POSSIBLE     Additional notes:PLEASE CALL AND ADVISE

## 2024-10-11 NOTE — PROGRESS NOTES
"Chief Complaint  Abdominal Pain (Right side pain, has shingles, lower abdomen )    Subjective          Ely Coats presents to [facility]  History of Present Illness    Here with two days of right lower quadrant pain.  It is throbbing in character.  It is lower than the area and different in character from her right flank shingles pain.  No rash, no fever, no dysuria, no hematuria.  She still has her appendix.  Pain is always present.  Tylenol isn't alleviating.  She does have a history of kidney stones.    Current Outpatient Medications   Medication Instructions    Accu-Chek Guide test strip USE 1 STRIP TWICE DAILY    albuterol sulfate  (90 Base) MCG/ACT inhaler 2 puffs, Inhalation, Every 4 Hours PRN    carvedilol (COREG) 12.5 mg, Oral, 2 Times Daily With Meals    cyanocobalamin (VITAMIN B-12) 500 mcg, Oral, Daily    dilTIAZem XR (DILACOR XR) 240 mg, Oral, Daily    Eliquis 5 mg, Oral, 2 Times Daily    FeroSul 325 mg, Oral, Daily With Breakfast    furosemide (LASIX) 20 mg, Oral, Daily PRN, Doesn't take every day    gabapentin (NEURONTIN) 300 mg, Oral, 3 Times Daily PRN    losartan (COZAAR) 25 mg, Oral, Daily    metFORMIN (GLUCOPHAGE) 500 mg, Oral, Daily With Breakfast    Multiple Vitamin (MULTIVITAMIN) tablet 1 tablet, Oral, Daily    nitrofurantoin (macrocrystal-monohydrate) (MACROBID) 100 mg, Oral, 2 Times Daily    polyethylene glycol (MIRALAX) 17 g, Oral, 2 Times Daily PRN       The following portions of the patient's history were reviewed and updated as appropriate: allergies, current medications, past family history, past medical history, past social history, past surgical history, and problem list.    Objective   Vital Signs:   /78 (BP Location: Left arm, Patient Position: Sitting, Cuff Size: Adult)   Pulse 59   Temp 98 °F (36.7 °C) (Temporal)   Ht 162.6 cm (64.02\")   Wt 67.3 kg (148 lb 6 oz)   SpO2 97%   BMI 25.45 kg/m²     Wt " Readings from Last 3 Encounters:   10/11/24 67.3 kg (148 lb 6 oz)   10/08/24 67.1 kg (148 lb)   09/17/24 68.3 kg (150 lb 9.6 oz)     BP Readings from Last 3 Encounters:   10/11/24 137/78   10/08/24 134/81   09/17/24 124/74     Physical Exam  Vitals reviewed.   Constitutional:       General: She is not in acute distress.     Appearance: Normal appearance. She is not ill-appearing, toxic-appearing or diaphoretic.   HENT:      Head: Normocephalic and atraumatic.      Right Ear: External ear normal.      Left Ear: External ear normal.   Eyes:      Conjunctiva/sclera: Conjunctivae normal.   Cardiovascular:      Rate and Rhythm: Normal rate and regular rhythm.      Pulses: Normal pulses.      Heart sounds: Normal heart sounds. No murmur heard.     No friction rub. No gallop.   Pulmonary:      Effort: Pulmonary effort is normal. No respiratory distress.      Breath sounds: Normal breath sounds. No stridor. No wheezing, rhonchi or rales.   Chest:      Chest wall: No tenderness.   Abdominal:      General: Abdomen is flat.      Palpations: Abdomen is soft. There is no mass.      Tenderness: There is abdominal tenderness. There is no guarding or rebound.      Comments: RLQ is TTP without guarding or rebound   Musculoskeletal:      Right lower leg: No edema.      Left lower leg: No edema.   Skin:     General: Skin is warm and dry.   Neurological:      General: No focal deficit present.      Mental Status: She is alert. Mental status is at baseline.   Psychiatric:         Behavior: Behavior normal.         Thought Content: Thought content normal.         Judgment: Judgment normal.          Result Review :   The following data was reviewed by: Julius Tijerina MD on 10/11/2024:  Common labs          9/13/2024    11:59 9/14/2024    03:43 9/17/2024    09:01   Common Labs   Glucose 210  118     BUN 19  29     Creatinine 1.03  0.72     Sodium 142  138     Potassium 3.6  4.2     Chloride 102  107     Calcium 9.4  8.6     Albumin 4.0       Total Bilirubin 0.5      Alkaline Phosphatase 60      AST (SGOT) 13      ALT (SGPT) 14      WBC 12.76  9.80     Hemoglobin 14.5  13.3     Hematocrit 42.3  40.1     Platelets 331  263     Microalbumin, Urine   3.5             Lab Results   Component Value Date    COVID19 NOT DETECTED 07/15/2020    INR 1.10 01/09/2024    BILIRUBINUR Small (1+) (A) 10/11/2024          Assessment and Plan    Diagnoses and all orders for this visit:    1. Right lower quadrant pain (Primary)  -     POC Urinalysis Dipstick, Automated  -     Urine Culture - Urine, Urine, Clean Catch  -     Urinalysis With Microscopic - Urine, Clean Catch    2. Primary hypertension    3. Acute cystitis with hematuria  -     nitrofurantoin, macrocrystal-monohydrate, (Macrobid) 100 MG capsule; Take 1 capsule by mouth 2 (Two) Times a Day for 5 days.  Dispense: 10 capsule; Refill: 0      RLQ pain:  -I have considered the following: shingles, MSK injury, ovarian etiology, appendicitis, nephrolithiasis, UTI  -UA positive for LE and nitrites.  Have sent macrobid  -if worsening RLQ pain, recommended evaluation in the ER    HTN:  -stable  -on coreg, diltiazem, losartan    There are no discontinued medications.       Follow Up   Return if symptoms worsen or fail to improve, for Please keep 11/19/24 follow up with Dr. Barnes.  Patient was given instructions and counseling regarding her condition or for health maintenance advice. Please see specific information pulled into the AVS if appropriate.       Julius Tijerina MD  10/11/24  15:32 EDT

## 2024-10-12 LAB
BACTERIA UR QL AUTO: ABNORMAL /HPF
COD CRY URNS QL: ABNORMAL /HPF
HYALINE CASTS UR QL AUTO: ABNORMAL /LPF
RBC # UR STRIP: ABNORMAL /HPF
REF LAB TEST METHOD: ABNORMAL
SQUAMOUS #/AREA URNS HPF: ABNORMAL /HPF
WBC # UR STRIP: ABNORMAL /HPF

## 2024-10-13 LAB — BACTERIA SPEC AEROBE CULT: ABNORMAL

## 2024-10-15 ENCOUNTER — APPOINTMENT (OUTPATIENT)
Dept: CT IMAGING | Facility: HOSPITAL | Age: 79
End: 2024-10-15
Payer: MEDICARE

## 2024-10-15 ENCOUNTER — HOSPITAL ENCOUNTER (EMERGENCY)
Facility: HOSPITAL | Age: 79
Discharge: HOME | End: 2024-10-15
Attending: EMERGENCY MEDICINE | Admitting: EMERGENCY MEDICINE
Payer: MEDICARE

## 2024-10-15 VITALS
HEART RATE: 61 BPM | SYSTOLIC BLOOD PRESSURE: 158 MMHG | OXYGEN SATURATION: 96 % | DIASTOLIC BLOOD PRESSURE: 84 MMHG | TEMPERATURE: 98.9 F | BODY MASS INDEX: 26.29 KG/M2 | RESPIRATION RATE: 21 BRPM | WEIGHT: 148.37 LBS | HEIGHT: 63 IN

## 2024-10-15 DIAGNOSIS — N39.0 URINARY TRACT INFECTION WITHOUT HEMATURIA, SITE UNSPECIFIED: Primary | ICD-10-CM

## 2024-10-15 DIAGNOSIS — B02.29 POSTHERPETIC NEURALGIA: ICD-10-CM

## 2024-10-15 LAB
ALBUMIN SERPL-MCNC: 4.1 G/DL (ref 3.5–5.2)
ALBUMIN/GLOB SERPL: 1.7 G/DL
ALP SERPL-CCNC: 52 U/L (ref 39–117)
ALT SERPL W P-5'-P-CCNC: 8 U/L (ref 1–33)
ANION GAP SERPL CALCULATED.3IONS-SCNC: 7.4 MMOL/L (ref 5–15)
AST SERPL-CCNC: 12 U/L (ref 1–32)
BACTERIA UR QL AUTO: ABNORMAL /HPF
BASOPHILS # BLD AUTO: 0.04 10*3/MM3 (ref 0–0.2)
BASOPHILS NFR BLD AUTO: 0.8 % (ref 0–1.5)
BILIRUB SERPL-MCNC: 0.2 MG/DL (ref 0–1.2)
BILIRUB UR QL STRIP: NEGATIVE
BUN SERPL-MCNC: 11 MG/DL (ref 8–23)
BUN/CREAT SERPL: 16.2 (ref 7–25)
CALCIUM SPEC-SCNC: 9.1 MG/DL (ref 8.6–10.5)
CHLORIDE SERPL-SCNC: 105 MMOL/L (ref 98–107)
CLARITY UR: CLEAR
CO2 SERPL-SCNC: 28.6 MMOL/L (ref 22–29)
COLOR UR: ABNORMAL
CREAT SERPL-MCNC: 0.68 MG/DL (ref 0.57–1)
D-LACTATE SERPL-SCNC: 1 MMOL/L (ref 0.5–2)
DEPRECATED RDW RBC AUTO: 52.6 FL (ref 37–54)
EGFRCR SERPLBLD CKD-EPI 2021: 87.6 ML/MIN/1.73
EOSINOPHIL # BLD AUTO: 0.08 10*3/MM3 (ref 0–0.4)
EOSINOPHIL NFR BLD AUTO: 1.6 % (ref 0.3–6.2)
ERYTHROCYTE [DISTWIDTH] IN BLOOD BY AUTOMATED COUNT: 14.1 % (ref 12.3–15.4)
GLOBULIN UR ELPH-MCNC: 2.4 GM/DL
GLUCOSE SERPL-MCNC: 93 MG/DL (ref 65–99)
GLUCOSE UR STRIP-MCNC: NEGATIVE MG/DL
HCT VFR BLD AUTO: 39.9 % (ref 34–46.6)
HGB BLD-MCNC: 13.1 G/DL (ref 12–15.9)
HGB UR QL STRIP.AUTO: NEGATIVE
HOLD SPECIMEN: NORMAL
HOLD SPECIMEN: NORMAL
HYALINE CASTS UR QL AUTO: ABNORMAL /LPF
IMM GRANULOCYTES # BLD AUTO: 0.03 10*3/MM3 (ref 0–0.05)
IMM GRANULOCYTES NFR BLD AUTO: 0.6 % (ref 0–0.5)
KETONES UR QL STRIP: NEGATIVE
LEUKOCYTE ESTERASE UR QL STRIP.AUTO: ABNORMAL
LIPASE SERPL-CCNC: 50 U/L (ref 13–60)
LYMPHOCYTES # BLD AUTO: 1.64 10*3/MM3 (ref 0.7–3.1)
LYMPHOCYTES NFR BLD AUTO: 33 % (ref 19.6–45.3)
MCH RBC QN AUTO: 33.1 PG (ref 26.6–33)
MCHC RBC AUTO-ENTMCNC: 32.8 G/DL (ref 31.5–35.7)
MCV RBC AUTO: 100.8 FL (ref 79–97)
MONOCYTES # BLD AUTO: 0.58 10*3/MM3 (ref 0.1–0.9)
MONOCYTES NFR BLD AUTO: 11.7 % (ref 5–12)
NEUTROPHILS NFR BLD AUTO: 2.6 10*3/MM3 (ref 1.7–7)
NEUTROPHILS NFR BLD AUTO: 52.3 % (ref 42.7–76)
NITRITE UR QL STRIP: NEGATIVE
NRBC BLD AUTO-RTO: 0 /100 WBC (ref 0–0.2)
PH UR STRIP.AUTO: 7.5 [PH] (ref 5–8)
PLATELET # BLD AUTO: 259 10*3/MM3 (ref 140–450)
PMV BLD AUTO: 10 FL (ref 6–12)
POTASSIUM SERPL-SCNC: 4.2 MMOL/L (ref 3.5–5.2)
PROT SERPL-MCNC: 6.5 G/DL (ref 6–8.5)
PROT UR QL STRIP: NEGATIVE
RBC # BLD AUTO: 3.96 10*6/MM3 (ref 3.77–5.28)
RBC # UR STRIP: ABNORMAL /HPF
REF LAB TEST METHOD: ABNORMAL
SODIUM SERPL-SCNC: 141 MMOL/L (ref 136–145)
SP GR UR STRIP: 1.01 (ref 1–1.03)
SQUAMOUS #/AREA URNS HPF: ABNORMAL /HPF
UROBILINOGEN UR QL STRIP: ABNORMAL
WBC # UR STRIP: ABNORMAL /HPF
WBC NRBC COR # BLD AUTO: 4.97 10*3/MM3 (ref 3.4–10.8)
WHOLE BLOOD HOLD COAG: NORMAL
WHOLE BLOOD HOLD SPECIMEN: NORMAL

## 2024-10-15 PROCEDURE — 25010000002 CEFTRIAXONE PER 250 MG: Performed by: EMERGENCY MEDICINE

## 2024-10-15 PROCEDURE — 83690 ASSAY OF LIPASE: CPT

## 2024-10-15 PROCEDURE — 99285 EMERGENCY DEPT VISIT HI MDM: CPT

## 2024-10-15 PROCEDURE — 80053 COMPREHEN METABOLIC PANEL: CPT

## 2024-10-15 PROCEDURE — 96374 THER/PROPH/DIAG INJ IV PUSH: CPT

## 2024-10-15 PROCEDURE — 74177 CT ABD & PELVIS W/CONTRAST: CPT

## 2024-10-15 PROCEDURE — 85025 COMPLETE CBC W/AUTO DIFF WBC: CPT

## 2024-10-15 PROCEDURE — 25510000001 IOPAMIDOL PER 1 ML: Performed by: EMERGENCY MEDICINE

## 2024-10-15 PROCEDURE — 83605 ASSAY OF LACTIC ACID: CPT

## 2024-10-15 PROCEDURE — 81001 URINALYSIS AUTO W/SCOPE: CPT

## 2024-10-15 RX ORDER — IOPAMIDOL 755 MG/ML
100 INJECTION, SOLUTION INTRAVASCULAR
Status: COMPLETED | OUTPATIENT
Start: 2024-10-15 | End: 2024-10-15

## 2024-10-15 RX ORDER — SODIUM CHLORIDE 0.9 % (FLUSH) 0.9 %
10 SYRINGE (ML) INJECTION AS NEEDED
Status: DISCONTINUED | OUTPATIENT
Start: 2024-10-15 | End: 2024-10-15 | Stop reason: HOSPADM

## 2024-10-15 RX ORDER — CEPHALEXIN 500 MG/1
500 CAPSULE ORAL 3 TIMES DAILY
Qty: 21 CAPSULE | Refills: 0 | Status: SHIPPED | OUTPATIENT
Start: 2024-10-15

## 2024-10-15 RX ORDER — OXYCODONE AND ACETAMINOPHEN 5; 325 MG/1; MG/1
1 TABLET ORAL EVERY 8 HOURS PRN
Qty: 12 TABLET | Refills: 0 | Status: SHIPPED | OUTPATIENT
Start: 2024-10-15

## 2024-10-15 RX ADMIN — IOPAMIDOL 100 ML: 755 INJECTION, SOLUTION INTRAVENOUS at 09:57

## 2024-10-15 RX ADMIN — CEFTRIAXONE SODIUM 1000 MG: 1 INJECTION, POWDER, FOR SOLUTION INTRAMUSCULAR; INTRAVENOUS at 12:21

## 2024-10-15 NOTE — Clinical Note
[facility] EMERGENCY ROOM  [address]  [place] [address]  Phone: [phone]  Fax: [phone]    Ely Coats was seen and treated in our emergency department on 10/15/2024.  She may return to work on 10/18/2024.         Thank you for choosing [facility].    Corrie Ponce RN

## 2024-10-15 NOTE — Clinical Note
[facility] EMERGENCY ROOM  [address]  [place] [address]  Phone: [phone]  Fax: [phone]    Ely Coats was seen and treated in our emergency department on 10/15/2024.  She may return to work on 10/18/2024.         Thank you for choosing [facility].    Corrie Ponce RN

## 2024-10-15 NOTE — ED PROVIDER NOTES
Time: 12:08 PM EDT  Date of encounter:  10/15/2024  Independent Historian/Clinical History and Information was obtained by:   Patient    History is limited by: N/A    Chief Complaint: Low abdominal pain      History of Present Illness:  Patient is a 79 y.o. year old female who presents to the emergency department for evaluation of low abdominal pain and right-sided pain.  Patient reports recently being treated for UTI but also states she had shingles in the right side area couple months ago and has had pain since that time.      Patient Care Team  Primary Care Provider: Rachid Barnes Jr., MD    Past Medical History:     Allergies   Allergen Reactions    Penicillins Rash     Mild rash- pt states can take amoxicillin or keflex      Statins Other (See Comments)     Hair loss crestor     Past Medical History:   Diagnosis Date    Atrial fibrillation with RVR     FOLLOWS W/LINK, NO RECENT ISSUES, NO CP    Calculus of gallbladder with acute on chronic cholecystitis with obstruction 05/09/2022    Cancer     SKIN CANCER REMOVED    Colon polyp     Contusion of face     Diabetes mellitus     BS IN  THE A.M. 120 ON AVERAGE    Essential hypertension     CONT W/MEDS    Fall     Gallstones     Hyperlipidemia     Kidney stone     Osteoarthritis     Osteopenia     Renal insufficiency     due to nsaids (8/2017 states labs wnl) NO CURRENT PROBLEMS    Snoring     NO CPAP    Wound infection after surgery 05/09/2022     Past Surgical History:   Procedure Laterality Date    BLEPHAROPLASTY Bilateral     CARDIAC CATHETERIZATION Left 2011    [facility], Dr. Cooper Ramires, NO INTERVENTION    CHOLECYSTECTOMY N/A 5/4/2022    Procedure: CHOLECYSTECTOMY LAPAROSCOPIC;  Surgeon: Josue Jernigan MD;  Location: [facility] MAIN OR;  Service: General;  Laterality: N/A;    COLONOSCOPY  2014    negative    COLONOSCOPY  2011    polyp-repeat 2014    COLONOSCOPY N/A 7/17/2024    Procedure: COLONOSCOPY;  Surgeon: Simba Nuno,  MD;  Location: [facility] ENDOSCOPY;  Service: Gastroenterology;  Laterality: N/A;  DIVERTICULOSIS    CYSTOSCOPY  09/2016    negative- Dr. Murphy per patient urethral polyp noted    ERCP N/A 04/20/2022    Procedure: ENDOSCOPIC RETROGRADE CHOLANGIOPANCREATOGRAPHY WITH SPHINCTEROTOMY, BALLOON SWEEP, COMMON BILE DUCT STENT PLACED, PANCREATIC DUCT STENT PLACED;  Surgeon: Gurvinder Perry MD;  Location: [facility] ENDOSCOPY;  Service: Gastroenterology;  Laterality: N/A;  BILE DUCT STONES    ERCP N/A 5/10/2022    Procedure: ENDOSCOPIC RETROGRADE CHOLANGIOPANCREATOGRAPHY WITH STENT REMOVAL X2, SPHINCTEROTOMY, 12-15MM BALLOON SWEEP WITH BILE DUCT STONE REMOVAL;  Surgeon: Gurvinder Perry MD;  Location: [facility] ENDOSCOPY;  Service: Gastroenterology;  Laterality: N/A;  BILE DUCT STONES    SKIN CANCER EXCISION  04/2016    basal cell on back    TOTAL KNEE ARTHROPLASTY Right 1/17/2024    Procedure: RIGHT TOTAL KNEE ARTHROPLASTY WITH CLARISSA ROBOT;  Surgeon: Geraldo Gann MD;  Location: [facility] MAIN OR;  Service: Robotics - Ortho;  Laterality: Right;    TUBAL ABDOMINAL LIGATION      WRIST FRACTURE SURGERY Left     ORIF     Family History   Problem Relation Age of Onset    Heart disease Mother         afib    Lung disease Mother         interstitial lung disease    Stroke Father         hemmorhagic stroke-aneurysm    Diabetes Brother     Malig Hyperthermia Neg Hx     Colon cancer Neg Hx        Home Medications:  Prior to Admission medications    Medication Sig Start Date End Date Taking? Authorizing Provider   Accu-Chek Guide test strip USE 1 STRIP TWICE DAILY    Provider, MD Zuleyma   albuterol sulfate  (90 Base) MCG/ACT inhaler Inhale 2 puffs Every 4 (Four) Hours As Needed for Wheezing or Shortness of Air. 6/20/24   Julius Tijerina MD   carvedilol (COREG) 12.5 MG tablet Take 1 tablet by mouth 2 (Two) Times a Day With Meals for 30 days. 9/14/24 10/14/24  Vivek Hilton MD   cephalexin (KEFLEX) 500 MG capsule  Take 1 capsule by mouth 3 (Three) Times a Day. 10/15/24   Rustam Maldonado MD   cyanocobalamin (VITAMIN B-12) 1000 MCG tablet Take 0.5 tablets by mouth Daily.    Zuleyma Hennessy MD   dilTIAZem XR (DILACOR XR) 240 MG 24 hr capsule Take 1 capsule by mouth Daily.    Zuleyma Hennessy MD   Eliquis 5 MG tablet tablet TAKE 1 TABLET BY MOUTH TWICE DAILY 8/30/24   Julius Tijerina MD   ferrous sulfate (FeroSul) 325 (65 FE) MG tablet TAKE 1 TABLET BY MOUTH DAILY WITH BREAKFAST. 7/25/24   Julius Tijerina MD   furosemide (LASIX) 20 MG tablet Take 1 tablet by mouth Daily As Needed. Doesn't take every day    Zuleyma Hennessy MD   gabapentin (NEURONTIN) 300 MG capsule Take 1 capsule by mouth 3 (Three) Times a Day As Needed (pain). 9/17/24   Rachid Barnes Jr., MD   losartan (Cozaar) 25 MG tablet Take 1 tablet by mouth Daily. 9/5/24   Julius Tijerina MD   metFORMIN (GLUCOPHAGE) 500 MG tablet Take 1 tablet by mouth Daily With Breakfast. 6/27/24   Julius Tijerina MD   Multiple Vitamin (MULTIVITAMIN) tablet Take 1 tablet by mouth Daily.    Zuleyma Hennessy MD   nitrofurantoin, macrocrystal-monohydrate, (Macrobid) 100 MG capsule Take 1 capsule by mouth 2 (Two) Times a Day for 5 days. 10/11/24 10/16/24  Julius Tijerina MD   oxyCODONE-acetaminophen (PERCOCET) 5-325 MG per tablet Take 1 tablet by mouth Every 8 (Eight) Hours As Needed for Severe Pain. 10/15/24   Rustam Maldonado MD   polyethylene glycol (MIRALAX) 17 GM/SCOOP powder Take 17 g by mouth 2 (Two) Times a Day As Needed (constipation). 1/19/24   Mg Mcdaniel MD        Social History:   Social History     Tobacco Use    Smoking status: Never     Passive exposure: Past    Smokeless tobacco: Never   Vaping Use    Vaping status: Never Used   Substance Use Topics    Alcohol use: Never    Drug use: Never         Review of Systems:  Review of Systems   Constitutional:  Negative for chills and fever.   HENT:  Negative for congestion, rhinorrhea and  "sore throat.    Eyes:  Negative for pain and visual disturbance.   Respiratory:  Negative for apnea, cough, chest tightness and shortness of breath.    Cardiovascular:  Negative for chest pain and palpitations.   Gastrointestinal:  Positive for abdominal pain. Negative for diarrhea, nausea and vomiting.   Genitourinary:  Positive for dysuria and flank pain. Negative for difficulty urinating.   Musculoskeletal:  Negative for joint swelling and myalgias.   Skin:  Negative for color change.   Neurological:  Negative for seizures and headaches.   Psychiatric/Behavioral: Negative.     All other systems reviewed and are negative.       Physical Exam:  /86 (BP Location: Right arm, Patient Position: Lying)   Pulse 64   Temp 98.9 °F (37.2 °C) (Oral)   Resp 21   Ht 160 cm (63\")   Wt 67.3 kg (148 lb 5.9 oz)   SpO2 95%   BMI 26.28 kg/m²     Physical Exam  Vitals and nursing note reviewed.   Constitutional:       General: She is not in acute distress.     Appearance: Normal appearance. She is not toxic-appearing.   HENT:      Head: Normocephalic and atraumatic.      Jaw: There is normal jaw occlusion.   Eyes:      General: Lids are normal.      Extraocular Movements: Extraocular movements intact.      Conjunctiva/sclera: Conjunctivae normal.      Pupils: Pupils are equal, round, and reactive to light.   Cardiovascular:      Rate and Rhythm: Normal rate and regular rhythm.      Pulses: Normal pulses.      Heart sounds: Normal heart sounds.   Pulmonary:      Effort: Pulmonary effort is normal. No respiratory distress.      Breath sounds: Normal breath sounds. No wheezing or rhonchi.   Abdominal:      General: Abdomen is flat.      Palpations: Abdomen is soft.      Tenderness: There is abdominal tenderness in the right lower quadrant and suprapubic area. There is no guarding or rebound.   Musculoskeletal:         General: Normal range of motion.      Cervical back: Normal range of motion and neck supple.      Right " lower leg: No edema.      Left lower leg: No edema.   Skin:     General: Skin is warm and dry.   Neurological:      Mental Status: She is alert and oriented to person, place, and time. Mental status is at baseline.   Psychiatric:         Mood and Affect: Mood normal.                  Procedures:  Procedures      Medical Decision Making:      Comorbidities that affect care:    A-fib, hypertension    External Notes reviewed:    Previous Clinic Note: Internal medicine office visit for abdominal pain management      The following orders were placed and all results were independently analyzed by me:  Orders Placed This Encounter   Procedures    CT Abdomen Pelvis With Contrast    [place] Draw    Comprehensive Metabolic Panel    Lipase    Urinalysis With Microscopic If Indicated (No Culture) - Urine, Clean Catch    Lactic Acid, Plasma    CBC Auto Differential    Urinalysis, Microscopic Only - Urine, Clean Catch    NPO Diet NPO Type: Strict NPO    Undress & Gown    Insert Peripheral IV    CBC & Differential    Green Top (Gel)    Lavender Top    Gold Top - SST    Light Blue Top       Medications Given in the Emergency Department:  Medications   sodium chloride 0.9 % flush 10 mL (has no administration in time range)   cefTRIAXone (ROCEPHIN) in NS 1 gram/10ml IV PUSH syringe (has no administration in time range)   iopamidol (ISOVUE-370) 76 % injection 100 mL (100 mL Intravenous Given 10/15/24 0957)        ED Course:         Labs:    Lab Results (last 24 hours)       Procedure Component Value Units Date/Time    CBC & Differential [[id number]]  (Abnormal) Collected: 10/15/24 0835    Specimen: Blood from Arm, Left Updated: 10/15/24 0849    Narrative:      The following orders were created for panel order CBC & Differential.  Procedure                               Abnormality         Status                     ---------                               -----------         ------                     CBC Auto Differential[[id number]]         Abnormal            Final result                 Please view results for these tests on the individual orders.    Comprehensive Metabolic Panel [[id number]] Collected: 10/15/24 0835    Specimen: Blood from Arm, Left Updated: 10/15/24 0908     Glucose 93 mg/dL      BUN 11 mg/dL      Creatinine 0.68 mg/dL      Sodium 141 mmol/L      Potassium 4.2 mmol/L      Chloride 105 mmol/L      CO2 28.6 mmol/L      Calcium 9.1 mg/dL      Total Protein 6.5 g/dL      Albumin 4.1 g/dL      ALT (SGPT) 8 U/L      AST (SGOT) 12 U/L      Alkaline Phosphatase 52 U/L      Total Bilirubin 0.2 mg/dL      Globulin 2.4 gm/dL      A/G Ratio 1.7 g/dL      BUN/Creatinine Ratio 16.2     Anion Gap 7.4 mmol/L      eGFR 87.6 mL/min/1.73     Narrative:      GFR Normal >60  Chronic Kidney Disease <60  Kidney Failure <15    The GFR formula is only valid for adults with stable renal function between ages 18 and 70.    Lipase [[id number]]  (Normal) Collected: 10/15/24 0835    Specimen: Blood from Arm, Left Updated: 10/15/24 0908     Lipase 50 U/L     Lactic Acid, Plasma [[id number]]  (Normal) Collected: 10/15/24 0835    Specimen: Blood from Arm, Left Updated: 10/15/24 0904     Lactate 1.0 mmol/L     CBC Auto Differential [[id number]]  (Abnormal) Collected: 10/15/24 0835    Specimen: Blood from Arm, Left Updated: 10/15/24 0849     WBC 4.97 10*3/mm3      RBC 3.96 10*6/mm3      Hemoglobin 13.1 g/dL      Hematocrit 39.9 %      .8 fL      MCH 33.1 pg      MCHC 32.8 g/dL      RDW 14.1 %      RDW-SD 52.6 fl      MPV 10.0 fL      Platelets 259 10*3/mm3      Neutrophil % 52.3 %      Lymphocyte % 33.0 %      Monocyte % 11.7 %      Eosinophil % 1.6 %      Basophil % 0.8 %      Immature Grans % 0.6 %      Neutrophils, Absolute 2.60 10*3/mm3      Lymphocytes, Absolute 1.64 10*3/mm3      Monocytes, Absolute 0.58 10*3/mm3      Eosinophils, Absolute 0.08 10*3/mm3      Basophils, Absolute 0.04 10*3/mm3      Immature Grans, Absolute 0.03 10*3/mm3      nRBC 0.0 /100  WBC     Urinalysis With Microscopic If Indicated (No Culture) - Urine, Clean Catch [[id number]]  (Abnormal) Collected: 10/15/24 0836    Specimen: Urine, Clean Catch Updated: 10/15/24 0858     Color, UA Dark Yellow     Appearance, UA Clear     pH, UA 7.5     Specific Gravity, UA 1.011     Glucose, UA Negative     Ketones, UA Negative     Bilirubin, UA Negative     Blood, UA Negative     Protein, UA Negative     Leuk Esterase, UA Moderate (2+)     Nitrite, UA Negative     Urobilinogen, UA 0.2 E.U./dL    Urinalysis, Microscopic Only - Urine, Clean Catch [[id number]]  (Abnormal) Collected: 10/15/24 0836    Specimen: Urine, Clean Catch Updated: 10/15/24 0858     RBC, UA 3-5 /HPF      WBC, UA 3-5 /HPF      Bacteria, UA None Seen /HPF      Squamous Epithelial Cells, UA 3-6 /HPF      Hyaline Casts, UA 0-2 /LPF      Methodology Automated Microscopy             Imaging:    CT Abdomen Pelvis With Contrast    Result Date: 10/15/2024  CT ABDOMEN PELVIS W CONTRAST Date of Exam: 10/15/2024 9:50 AM EDT Indication: low abd pain Abdominal pain. Comparison: CT of the abdomen and pelvis dated 8/18/2024 Technique: Axial CT images were obtained of the abdomen and pelvis after the uneventful intravenous administration of iodinated contrast. Reconstructed coronal and sagittal images were also obtained. Automated exposure control and iterative construction methods were used. Findings: Liver: The liver is prominent in size. No focal liver lesion is seen. No biliary dilation is seen. Mild pneumobilia is noted. Gallbladder: Surgically absent. Pancreas: Unremarkable. Spleen: Unremarkable. Adrenal glands: Unremarkable. Genitourinary tract: Kidneys are unremarkable. No hydronephrosis is seen. The visualized portions of the ureters and urinary bladder appear unremarkable. Pelvic organs demonstrate no acute abnormality. Gastrointestinal tract: Colonic diverticulosis is present. No evidence of bowel obstruction. Appendix: No findings to suggest acute  appendicitis. Other findings: No free air or free fluid is identified. No pathologically enlarged lymph nodes are seen. Vascular calcifications are present. The IVC is unremarkable. Bones and soft tissues: No acute osseous lesion is identified. Superficial soft tissues demonstrate no acute abnormality. There is a small midline fat-containing fascial defect above the level of the umbilicus. Lung bases: The visualized lung bases are clear.     Impression: 1.No acute abnormality identified within the abdomen or pelvis. 2.Colonic diverticulosis. 3.Normal appendix. 4.Mild pneumobilia. 5.Additional findings as detailed above. Electronically Signed: Michael Lopez MD  10/15/2024 10:08 AM EDT  Workstation ID: [id number]       Differential Diagnosis and Discussion:    Abdominal Pain: Based on the patient's signs and symptoms, I considered abdominal aortic aneurysm, small bowel obstruction, pancreatitis, acute cholecystitis, acute appendecitis, peptic ulcer disease, gastritis, colitis, endocrine disorders, irritable bowel syndrome and other differential diagnosis an etiology of the patient's abdominal pain.    All labs were reviewed and interpreted by me.  CT scan radiology impression was interpreted by me.    MDM  Number of Diagnoses or Management Options  Postherpetic neuralgia  Urinary tract infection without hematuria, site unspecified  Diagnosis management comments: In summary this is an 79-year-old female patient who presents to the emergency department for evaluation of abdominal pain.  CBC independently reviewed and interpreted by me and shows no critical abnormalities.  CMP independently reviewed and interpreted by me and shows no critical abnormalities.  Urinalysis independent reviewed interpreted by me is mildly still positive for UTI for which patient has been given IV antibiotics in the emergency department will be discharged home with oral antibiotics.  On clinical examination there is no longer zoster rash in  the right lower quadrant area where patient previously states she had 1 however she does still seem to have sensitivity and pain in that area and believe she likely has postherpetic neuralgia in that area.  CT scan abdomen pelvis was performed and reviewed the results which are unremarkable and negative for acute intra-abdominal pathology.  Very strict return to ER and follow-up instructions have been provided to the patient.                         Patient Care Considerations:    CONSULT: I considered consulting urology, however no emergent indication, no obstructive uropathy identified      Consultants/Shared Management Plan:    None    Social Determinants of Health:    Patient is independent, reliable, and has access to care.       Disposition and Care Coordination:    Discharged: I considered escalation of care by admitting this patient to the hospital, however no obstructive uropathy or sepsis identified    I have explained the patient´s condition, diagnoses and treatment plan based on the information available to me at this time. I have answered questions and addressed any concerns. The patient has a good  understanding of the patient´s diagnosis, condition, and treatment plan as can be expected at this point. The vital signs have been stable. The patient´s condition is stable and appropriate for discharge from the emergency department.      The patient will pursue further outpatient evaluation with the primary care physician or other designated or consulting physician as outlined in the discharge instructions. They are agreeable to this plan of care and follow-up instructions have been explained in detail. The patient has received these instructions in written format and has expressed an understanding of the discharge instructions. The patient is aware that any significant change in condition or worsening of symptoms should prompt an immediate return to this or the closest emergency department or call to  877.  I have explained discharge medications and the need for follow up with the patient/caretakers. This was also printed in the discharge instructions. Patient was discharged with the following medications and follow up:      Medication List        New Prescriptions      cephalexin 500 MG capsule  Commonly known as: KEFLEX  Take 1 capsule by mouth 3 (Three) Times a Day.     oxyCODONE-acetaminophen 5-325 MG per tablet  Commonly known as: PERCOCET  Take 1 tablet by mouth Every 8 (Eight) Hours As Needed for Severe Pain.               Where to Get Your Medications        These medications were sent to [facility] - [place] - [address] - [phone]  - [phone]   [address], [place] [address]      Phone: [phone]   cephalexin 500 MG capsule  oxyCODONE-acetaminophen 5-325 MG per tablet      Rachid Barnes Jr., MD  [address]  [address]  [facility] [address]  [phone]    In 1 week         Final diagnoses:   Urinary tract infection without hematuria, site unspecified   Postherpetic neuralgia        ED Disposition       ED Disposition   Discharge    Condition   Stable    Comment   --               This medical record created using voice recognition software.             Rustam Maldonado MD  10/15/24 121

## 2024-10-16 ENCOUNTER — TELEPHONE (OUTPATIENT)
Dept: ORTHOPEDIC SURGERY | Facility: CLINIC | Age: 79
End: 2024-10-16

## 2024-10-16 NOTE — TELEPHONE ENCOUNTER
Caller: Ely Dee    Relationship: Self    Best call back number: [phone]     What form or medical record are you requesting: [facility] PAPERWORK    Who is requesting this form or medical record from you: EMPLOYER    How would you like to receive the form or medical records (pick-up, mail, fax):    THE OFFICE SHOULD HAVE THE FAX# ON THE PAPERWORK THAT PT LEFT AT THE       Timeframe paperwork needed: ASAP    Additional notes: PT IS ASKING TO TALK TO [facility] IN REGARDS TO THE [facility] PAPERWORK. PT STATES THAT THE PAPERWORK IS DUE TODAY.    PLEASE CONTACT PT AND ADVISE

## 2024-10-16 NOTE — TELEPHONE ENCOUNTER
Caller: Ely Dee    Relationship to patient: Self      [phone] (home)     Patient is needing: PATIENT CALLED IN TO SPEAK WITH [facility] IN REGARDS TO [facility] PAPERWORK .       PATIENT STATES SHE HAD A 10 DAY TAT FOR HER PAPERWORK TO BE COMPLETED. TODAY IS THE LAST DAY OR MAYBE EVEN BE PAST DUE. THE PAPERS  WERE SUPPOSED TO HAVE BEEN FAXED. PATIENT WOULD LIKE TO BE NOTIFIED WHEN  THAT HAS BEEN DONE.

## 2024-10-21 ENCOUNTER — OFFICE VISIT (OUTPATIENT)
Dept: CARDIOLOGY | Facility: CLINIC | Age: 79
End: 2024-10-21
Payer: MEDICARE

## 2024-10-21 VITALS
BODY MASS INDEX: 26.93 KG/M2 | HEIGHT: 63 IN | SYSTOLIC BLOOD PRESSURE: 145 MMHG | HEART RATE: 101 BPM | WEIGHT: 152 LBS | DIASTOLIC BLOOD PRESSURE: 79 MMHG

## 2024-10-21 DIAGNOSIS — I48.0 PAROXYSMAL ATRIAL FIBRILLATION: Primary | ICD-10-CM

## 2024-10-21 DIAGNOSIS — I10 HYPERTENSION, ESSENTIAL: ICD-10-CM

## 2024-10-21 PROCEDURE — 99214 OFFICE O/P EST MOD 30 MIN: CPT | Performed by: INTERNAL MEDICINE

## 2024-10-21 PROCEDURE — 1159F MED LIST DOCD IN RCRD: CPT | Performed by: INTERNAL MEDICINE

## 2024-10-21 PROCEDURE — 1160F RVW MEDS BY RX/DR IN RCRD: CPT | Performed by: INTERNAL MEDICINE

## 2024-10-21 PROCEDURE — 3077F SYST BP >= 140 MM HG: CPT | Performed by: INTERNAL MEDICINE

## 2024-10-21 PROCEDURE — 3078F DIAST BP <80 MM HG: CPT | Performed by: INTERNAL MEDICINE

## 2024-10-21 RX ORDER — CARVEDILOL 12.5 MG/1
12.5 TABLET ORAL 2 TIMES DAILY WITH MEALS
Qty: 180 TABLET | Refills: 3 | Status: SHIPPED | OUTPATIENT
Start: 2024-10-21

## 2024-10-21 NOTE — PROGRESS NOTES
Chief Complaint  [facility] follow up, Atrial Fibrillation, and Hypertension    Subjective            Ely Coats presents to [facility] CARDIOLOGY  Atrial Fibrillation  Symptoms are negative for chest pain and shortness of breath. Past medical history includes atrial fibrillation.   Hypertension  Pertinent negatives include no chest pain or shortness of breath.       Ms. Crocker is here for follow-up evaluation management of paroxysmal atrial fibrillation, essential hypertension.  She was hospitalized briefly in September with recurrent atrial fibrillation.  She had been placed on steroids for about a week getting over shingles and after about the ninth day of being on the steroids she went into atrial fibrillation.  Her beta-blocker was adjusted.  She since has gone back into sinus rhythm.  Her echo showed no significant structural findings.    [facility]  Past Medical History:   Diagnosis Date    Atrial fibrillation with RVR     FOLLOWS W/LINK, NO RECENT ISSUES, NO CP    Calculus of gallbladder with acute on chronic cholecystitis with obstruction 05/09/2022    Cancer     SKIN CANCER REMOVED    Colon polyp     Contusion of face     Diabetes mellitus     BS IN  THE A.M. 120 ON AVERAGE    Essential hypertension     CONT W/MEDS    Fall     Gallstones     Hyperlipidemia     Kidney stone     Osteoarthritis     Osteopenia     Renal insufficiency     due to nsaids (8/2017 states labs wnl) NO CURRENT PROBLEMS    Snoring     NO CPAP    Wound infection after surgery 05/09/2022         SURGICALHX  Past Surgical History:   Procedure Laterality Date    BLEPHAROPLASTY Bilateral     CARDIAC CATHETERIZATION Left 2011    [facility], Dr. Cooper Ramires, NO INTERVENTION    CHOLECYSTECTOMY N/A 5/4/2022    Procedure: CHOLECYSTECTOMY LAPAROSCOPIC;  Surgeon: Josue Jernigan MD;  Location: [facility] MAIN OR;  Service: General;  Laterality: N/A;    COLONOSCOPY  2014    negative    COLONOSCOPY  2011     polyp-repeat 2014    COLONOSCOPY N/A 7/17/2024    Procedure: COLONOSCOPY;  Surgeon: Simba Nuno MD;  Location: [facility] ENDOSCOPY;  Service: Gastroenterology;  Laterality: N/A;  DIVERTICULOSIS    CYSTOSCOPY  09/2016    negative- Dr. Murphy per patient urethral polyp noted    ERCP N/A 04/20/2022    Procedure: ENDOSCOPIC RETROGRADE CHOLANGIOPANCREATOGRAPHY WITH SPHINCTEROTOMY, BALLOON SWEEP, COMMON BILE DUCT STENT PLACED, PANCREATIC DUCT STENT PLACED;  Surgeon: Gurvinder Perry MD;  Location: [facility] ENDOSCOPY;  Service: Gastroenterology;  Laterality: N/A;  BILE DUCT STONES    ERCP N/A 5/10/2022    Procedure: ENDOSCOPIC RETROGRADE CHOLANGIOPANCREATOGRAPHY WITH STENT REMOVAL X2, SPHINCTEROTOMY, 12-15MM BALLOON SWEEP WITH BILE DUCT STONE REMOVAL;  Surgeon: Gurvinder Perry MD;  Location: [facility] ENDOSCOPY;  Service: Gastroenterology;  Laterality: N/A;  BILE DUCT STONES    SKIN CANCER EXCISION  04/2016    basal cell on back    TOTAL KNEE ARTHROPLASTY Right 1/17/2024    Procedure: RIGHT TOTAL KNEE ARTHROPLASTY WITH CLARISSA ROBOT;  Surgeon: Geraldo Gann MD;  Location: [facility] MAIN OR;  Service: Robotics - Ortho;  Laterality: Right;    TUBAL ABDOMINAL LIGATION      WRIST FRACTURE SURGERY Left     ORIF        SOC  Social History     Socioeconomic History    Marital status:    Tobacco Use    Smoking status: Never     Passive exposure: Past    Smokeless tobacco: Never   Vaping Use    Vaping status: Never Used   Substance and Sexual Activity    Alcohol use: Never    Drug use: Never    Sexual activity: Defer         FAMHX  Family History   Problem Relation Age of Onset    Heart disease Mother         afib    Lung disease Mother         interstitial lung disease    Stroke Father         hemmorhagic stroke-aneurysm    Diabetes Brother     Malig Hyperthermia Neg Hx     Colon cancer Neg Hx           ALLERGY  Allergies   Allergen Reactions    Penicillins Rash     Mild rash- pt states can take amoxicillin  or keflex      Statins Other (See Comments)     Hair loss crestor        MEDSCURRENT    Current Outpatient Medications:     Accu-Chek Guide test strip, USE 1 STRIP TWICE DAILY, Disp: , Rfl:     albuterol sulfate  (90 Base) MCG/ACT inhaler, Inhale 2 puffs Every 4 (Four) Hours As Needed for Wheezing or Shortness of Air., Disp: 18 g, Rfl: 3    carvedilol (COREG) 12.5 MG tablet, Take 1 tablet by mouth 2 (Two) Times a Day With Meals., Disp: 180 tablet, Rfl: 3    cephalexin (KEFLEX) 500 MG capsule, Take 1 capsule by mouth 3 (Three) Times a Day., Disp: 21 capsule, Rfl: 0    cyanocobalamin (VITAMIN B-12) 1000 MCG tablet, Take 0.5 tablets by mouth Daily., Disp: , Rfl:     dilTIAZem XR (DILACOR XR) 240 MG 24 hr capsule, Take 1 capsule by mouth Daily., Disp: , Rfl:     Eliquis 5 MG tablet tablet, TAKE 1 TABLET BY MOUTH TWICE DAILY, Disp: 60 tablet, Rfl: 2    ferrous sulfate (FeroSul) 325 (65 FE) MG tablet, TAKE 1 TABLET BY MOUTH DAILY WITH BREAKFAST., Disp: 90 tablet, Rfl: 0    furosemide (LASIX) 20 MG tablet, Take 1 tablet by mouth Daily As Needed. Doesn't take every day, Disp: , Rfl:     gabapentin (NEURONTIN) 300 MG capsule, Take 1 capsule by mouth 3 (Three) Times a Day As Needed (pain)., Disp: 30 capsule, Rfl: 0    losartan (Cozaar) 25 MG tablet, Take 1 tablet by mouth Daily., Disp: 90 tablet, Rfl: 2    metFORMIN (GLUCOPHAGE) 500 MG tablet, Take 1 tablet by mouth Daily With Breakfast., Disp: 90 tablet, Rfl: 1    Multiple Vitamin (MULTIVITAMIN) tablet, Take 1 tablet by mouth Daily., Disp: , Rfl:     oxyCODONE-acetaminophen (PERCOCET) 5-325 MG per tablet, Take 1 tablet by mouth Every 8 (Eight) Hours As Needed for Severe Pain., Disp: 12 tablet, Rfl: 0    polyethylene glycol (MIRALAX) 17 GM/SCOOP powder, Take 17 g by mouth 2 (Two) Times a Day As Needed (constipation)., Disp: 238 g, Rfl: 0      Review of Systems   Cardiovascular:  Positive for irregular heartbeat. Negative for chest pain.   Respiratory:  Negative for  "shortness of breath.         Objective     /79   Pulse 101   Ht 160 cm (63\")   Wt 68.9 kg (152 lb)   BMI 26.93 kg/m²       General Appearance:   well developed  well nourished  HENT:   oropharynx moist  lips not cyanotic  Neck:  thyroid not enlarged  supple  Respiratory:  no respiratory distress  normal breath sounds  no rales  Cardiovascular:  no jugular venous distention  regular rhythm  apical impulse normal  S1 normal, S2 normal  no S3, no S4   no murmur  no rub, no thrill  carotid pulses normal; no bruit  pedal pulses normal  lower extremity edema: none    Musculoskeletal:  no clubbing of fingers.   normocephalic, head atraumatic  Skin:   warm, dry  Psychiatric:  judgement and insight appropriate  normal mood and affect      Result Review :     The following data was reviewed by: Sidney Suazo MD on 10/21/2024:    CMP          9/13/2024    11:59 9/14/2024    03:43 10/15/2024    08:35   CMP   Glucose 210  118  93    BUN 19  29  11    Creatinine 1.03  0.72  0.68    EGFR 54.7  84.1  87.6    Sodium 142  138  141    Potassium 3.6  4.2  4.2    Chloride 102  107  105    Calcium 9.4  8.6  9.1    Total Protein 6.9   6.5    Albumin 4.0   4.1    Globulin 2.9   2.4    Total Bilirubin 0.5   0.2    Alkaline Phosphatase 60   52    AST (SGOT) 13   12    ALT (SGPT) 14   8    Albumin/Globulin Ratio 1.4   1.7    BUN/Creatinine Ratio 18.4  40.3  16.2    Anion Gap 16.7  8.8  7.4      CBC          9/13/2024    11:59 9/14/2024    03:43 10/15/2024    08:35   CBC   WBC 12.76  9.80  4.97    RBC 4.38  4.02  3.96    Hemoglobin 14.5  13.3  13.1    Hematocrit 42.3  40.1  39.9    MCV 96.6  99.8  100.8    MCH 33.1  33.1  33.1    MCHC 34.3  33.2  32.8    RDW 14.5  14.6  14.1    Platelets 331  263  259      Lipid Panel          12/21/2023    09:54   Lipid Panel   Total Cholesterol 203    Triglycerides 136    HDL Cholesterol 60    VLDL Cholesterol 24    LDL Cholesterol  119    LDL/HDL Ratio 1.93      TSH          " 9/13/2024    11:59 9/13/2024    18:20   TSH   TSH 2.060  0.923        Data reviewed : Hospital records and echo reviewed      Procedures             Assessment and Plan        ASSESSMENT:  Encounter Diagnoses   Name Primary?    Paroxysmal atrial fibrillation Yes    Hypertension, essential          PLAN:    1.  Paroxysmal atrial fibrillation-the patient is maintaining sinus rhythm currently.  Continue current medical therapy  2.  Essential hypertension-Home readings are within normal range, continue combination medical therapy  3.  Chronic anticoagulant use-stable, continue Eliquis    Follow-up as scheduled in June otherwise          Patient was given instructions and counseling regarding her condition or for health maintenance advice. Please see specific information pulled into the AVS if appropriate.             ERIKA Suazo MD  10/21/2024    09:10 EDT

## 2024-10-28 DIAGNOSIS — B02.9 HERPES ZOSTER WITHOUT COMPLICATION: ICD-10-CM

## 2024-10-28 RX ORDER — GABAPENTIN 300 MG/1
CAPSULE ORAL
Qty: 30 CAPSULE | Refills: 0 | Status: SHIPPED | OUTPATIENT
Start: 2024-10-28

## 2024-10-28 NOTE — TELEPHONE ENCOUNTER
Refill request for controlled substance.      Date of request: 10/28/2024   Medication requested: Gabapentin  Last OV: 10/11/24  Last UDS: 9/17/24  Contract signed: No  Next office visit: 11/19/24    Katt Peña

## 2024-11-05 RX ORDER — DILTIAZEM HYDROCHLORIDE 240 MG/1
240 CAPSULE, EXTENDED RELEASE ORAL DAILY
Qty: 90 CAPSULE | Refills: 3 | Status: SHIPPED | OUTPATIENT
Start: 2024-11-05

## 2024-12-02 RX ORDER — APIXABAN 5 MG/1
5 TABLET, FILM COATED ORAL 2 TIMES DAILY
Qty: 60 TABLET | Refills: 2 | Status: SHIPPED | OUTPATIENT
Start: 2024-12-02

## 2024-12-05 DIAGNOSIS — B02.9 HERPES ZOSTER WITHOUT COMPLICATION: ICD-10-CM

## 2024-12-05 RX ORDER — GABAPENTIN 300 MG/1
CAPSULE ORAL
Qty: 30 CAPSULE | Refills: 0 | Status: SHIPPED | OUTPATIENT
Start: 2024-12-05

## 2024-12-05 NOTE — TELEPHONE ENCOUNTER
Request for Controlled Substance      Date of Request: 12/5/2024  Medication: Gabapentin  Last OV: 10/11/24  Next OV: 12/19/24  Last UDS: 9/17/24  Last Narcotic Consent: none

## 2024-12-19 ENCOUNTER — OFFICE VISIT (OUTPATIENT)
Dept: INTERNAL MEDICINE | Facility: CLINIC | Age: 79
End: 2024-12-19
Payer: MEDICARE

## 2024-12-19 VITALS
HEIGHT: 63 IN | DIASTOLIC BLOOD PRESSURE: 58 MMHG | SYSTOLIC BLOOD PRESSURE: 97 MMHG | HEART RATE: 55 BPM | TEMPERATURE: 96.5 F | BODY MASS INDEX: 28.17 KG/M2 | WEIGHT: 159 LBS | OXYGEN SATURATION: 97 %

## 2024-12-19 DIAGNOSIS — Z00.00 ANNUAL PHYSICAL EXAM: Primary | ICD-10-CM

## 2024-12-19 DIAGNOSIS — I48.0 PAROXYSMAL ATRIAL FIBRILLATION: Chronic | ICD-10-CM

## 2024-12-19 DIAGNOSIS — E78.2 MIXED HYPERLIPIDEMIA: ICD-10-CM

## 2024-12-19 DIAGNOSIS — Z29.11 NEED FOR RSV VACCINATION: ICD-10-CM

## 2024-12-19 DIAGNOSIS — R73.02 IMPAIRED GLUCOSE TOLERANCE: ICD-10-CM

## 2024-12-19 DIAGNOSIS — B02.9 HERPES ZOSTER WITHOUT COMPLICATION: ICD-10-CM

## 2024-12-19 PROBLEM — B02.29 POST HERPETIC NEURALGIA: Status: ACTIVE | Noted: 2024-12-19

## 2024-12-19 LAB
ALBUMIN SERPL-MCNC: 3.9 G/DL (ref 3.5–5.2)
ALBUMIN/GLOB SERPL: 2 G/DL
ALP SERPL-CCNC: 61 U/L (ref 39–117)
ALT SERPL W P-5'-P-CCNC: 12 U/L (ref 1–33)
ANION GAP SERPL CALCULATED.3IONS-SCNC: 9 MMOL/L (ref 5–15)
AST SERPL-CCNC: 19 U/L (ref 1–32)
BASOPHILS # BLD AUTO: 0.04 10*3/MM3 (ref 0–0.2)
BASOPHILS NFR BLD AUTO: 0.8 % (ref 0–1.5)
BILIRUB SERPL-MCNC: 0.2 MG/DL (ref 0–1.2)
BUN SERPL-MCNC: 22 MG/DL (ref 8–23)
BUN/CREAT SERPL: 24.4 (ref 7–25)
CALCIUM SPEC-SCNC: 9 MG/DL (ref 8.6–10.5)
CHLORIDE SERPL-SCNC: 104 MMOL/L (ref 98–107)
CHOLEST SERPL-MCNC: 224 MG/DL (ref 0–200)
CO2 SERPL-SCNC: 27 MMOL/L (ref 22–29)
CREAT SERPL-MCNC: 0.9 MG/DL (ref 0.57–1)
DEPRECATED RDW RBC AUTO: 46.4 FL (ref 37–54)
EGFRCR SERPLBLD CKD-EPI 2021: 64.4 ML/MIN/1.73
EOSINOPHIL # BLD AUTO: 0.13 10*3/MM3 (ref 0–0.4)
EOSINOPHIL NFR BLD AUTO: 2.6 % (ref 0.3–6.2)
ERYTHROCYTE [DISTWIDTH] IN BLOOD BY AUTOMATED COUNT: 12.2 % (ref 12.3–15.4)
GLOBULIN UR ELPH-MCNC: 2 GM/DL
GLUCOSE SERPL-MCNC: 111 MG/DL (ref 65–99)
HBA1C MFR BLD: 6 % (ref 4.8–5.6)
HCT VFR BLD AUTO: 34.6 % (ref 34–46.6)
HDLC SERPL-MCNC: 54 MG/DL (ref 40–60)
HGB BLD-MCNC: 11.3 G/DL (ref 12–15.9)
IMM GRANULOCYTES # BLD AUTO: 0.01 10*3/MM3 (ref 0–0.05)
IMM GRANULOCYTES NFR BLD AUTO: 0.2 % (ref 0–0.5)
LDLC SERPL CALC-MCNC: 143 MG/DL (ref 0–100)
LDLC/HDLC SERPL: 2.58 {RATIO}
LYMPHOCYTES # BLD AUTO: 1.7 10*3/MM3 (ref 0.7–3.1)
LYMPHOCYTES NFR BLD AUTO: 33.5 % (ref 19.6–45.3)
MCH RBC QN AUTO: 33.2 PG (ref 26.6–33)
MCHC RBC AUTO-ENTMCNC: 32.7 G/DL (ref 31.5–35.7)
MCV RBC AUTO: 101.8 FL (ref 79–97)
MONOCYTES # BLD AUTO: 0.53 10*3/MM3 (ref 0.1–0.9)
MONOCYTES NFR BLD AUTO: 10.4 % (ref 5–12)
NEUTROPHILS NFR BLD AUTO: 2.67 10*3/MM3 (ref 1.7–7)
NEUTROPHILS NFR BLD AUTO: 52.5 % (ref 42.7–76)
NRBC BLD AUTO-RTO: 0 /100 WBC (ref 0–0.2)
PLATELET # BLD AUTO: 218 10*3/MM3 (ref 140–450)
PMV BLD AUTO: 11.1 FL (ref 6–12)
POTASSIUM SERPL-SCNC: 4.4 MMOL/L (ref 3.5–5.2)
PROT SERPL-MCNC: 5.9 G/DL (ref 6–8.5)
RBC # BLD AUTO: 3.4 10*6/MM3 (ref 3.77–5.28)
SODIUM SERPL-SCNC: 140 MMOL/L (ref 136–145)
TRIGL SERPL-MCNC: 153 MG/DL (ref 0–150)
VLDLC SERPL-MCNC: 27 MG/DL (ref 5–40)
WBC NRBC COR # BLD AUTO: 5.08 10*3/MM3 (ref 3.4–10.8)

## 2024-12-19 PROCEDURE — 85025 COMPLETE CBC W/AUTO DIFF WBC: CPT | Performed by: INTERNAL MEDICINE

## 2024-12-19 PROCEDURE — 80053 COMPREHEN METABOLIC PANEL: CPT | Performed by: INTERNAL MEDICINE

## 2024-12-19 PROCEDURE — 83036 HEMOGLOBIN GLYCOSYLATED A1C: CPT | Performed by: INTERNAL MEDICINE

## 2024-12-19 PROCEDURE — 80061 LIPID PANEL: CPT | Performed by: INTERNAL MEDICINE

## 2024-12-19 NOTE — PROGRESS NOTES
Subjective   The ABCs of the Annual Wellness Visit  Medicare Wellness Visit      Ely Coats is a 79 y.o. patient who presents for a Medicare Wellness Visit.    The following portions of the patient's history were reviewed and   updated as appropriate: allergies, current medications, past family history, past medical history, past social history, past surgical history, and problem list.    Compared to one year ago, the patient's physical   health is the same.  Compared to one year ago, the patient's mental   health is the same.    Recent Hospitalizations:  This patient has had a [facility] admission record on file within the last 365 days.  Current Medical Providers:  Patient Care Team:  Rachid Barnes Jr., MD as PCP - General (Internal Medicine)  Josue Jernigan MD as Consulting Physician (General Surgery)    Outpatient Medications Prior to Visit   Medication Sig Dispense Refill    Accu-Chek Guide test strip USE 1 STRIP TWICE DAILY      albuterol sulfate  (90 Base) MCG/ACT inhaler Inhale 2 puffs Every 4 (Four) Hours As Needed for Wheezing or Shortness of Air. 18 g 3    carvedilol (COREG) 12.5 MG tablet Take 1 tablet by mouth 2 (Two) Times a Day With Meals. 180 tablet 3    cyanocobalamin (VITAMIN B-12) 1000 MCG tablet Take 0.5 tablets by mouth Daily.      dilTIAZem XR (Dilt-XR) 240 MG 24 hr capsule TAKE 1 CAPSULE BY MOUTH EVERY DAY 90 capsule 3    Eliquis 5 MG tablet tablet TAKE 1 TABLET BY MOUTH TWICE DAILY 60 tablet 2    ferrous sulfate (FeroSul) 325 (65 FE) MG tablet TAKE 1 TABLET BY MOUTH DAILY WITH BREAKFAST. 90 tablet 0    furosemide (LASIX) 20 MG tablet Take 1 tablet by mouth Daily As Needed. Doesn't take every day      losartan (Cozaar) 25 MG tablet Take 1 tablet by mouth Daily. 90 tablet 2    metFORMIN (GLUCOPHAGE) 500 MG tablet Take 1 tablet by mouth Daily With Breakfast. 90 tablet 1    Multiple Vitamin (MULTIVITAMIN) tablet Take 1 tablet by mouth Daily.       "polyethylene glycol (MIRALAX) 17 GM/SCOOP powder Take 17 g by mouth 2 (Two) Times a Day As Needed (constipation). 238 g 0    cephalexin (KEFLEX) 500 MG capsule Take 1 capsule by mouth 3 (Three) Times a Day. (Patient not taking: Reported on 12/19/2024) 21 capsule 0    gabapentin (NEURONTIN) 300 MG capsule TAKE 1 CAPSULE BY MOUTH THREE TIMES A DAY AS NEEDED FOR PAIN 30 capsule 0    oxyCODONE-acetaminophen (PERCOCET) 5-325 MG per tablet Take 1 tablet by mouth Every 8 (Eight) Hours As Needed for Severe Pain. (Patient not taking: Reported on 12/19/2024) 12 tablet 0     No facility-administered medications prior to visit.     No opioid medication identified on active medication list. I have reviewed chart for other potential  high risk medication/s and harmful drug interactions in the elderly.      Aspirin is not on active medication list.  Aspirin use is contraindicated for this patient due to: current use of Eliquis.  .    Patient Active Problem List   Diagnosis    Bilateral primary osteoarthritis of knee    Pain in both knees    Primary hypertension    Paroxysmal atrial fibrillation    S/P ERCP    Mixed hyperlipidemia    Right hip pain    Greater trochanteric bursitis of right hip    Primary localized osteoarthritis of right knee    Primary osteoarthritis of right knee    Aftercare following surgery of right total knee arthroplasty 1/17/2024    History of colon polyps    PAT (paroxysmal atrial tachycardia)    Impaired glucose tolerance    Post herpetic neuralgia     Advance Care Planning Advance Directive is not on file.  ACP discussion was held with the patient during this visit. Patient does not have an advance directive, information provided.          Objective   Vitals:    12/19/24 1138   BP: 97/58   BP Location: Left arm   Patient Position: Sitting   Pulse: 55   Temp: 96.5 °F (35.8 °C)   TempSrc: Temporal   SpO2: 97%   Weight: 72.1 kg (159 lb)   Height: 160 cm (63\")       Estimated body mass index is 28.17 kg/m² " "as calculated from the following:    Height as of this encounter: 160 cm (63\").    Weight as of this encounter: 72.1 kg (159 lb).       Does the patient have evidence of cognitive impairment? No  Lab Results   Component Value Date    TRIG 153 (H) 2024    HDL 54 2024     (H) 2024    VLDL 27 2024    HGBA1C 6.00 (H) 2024                                                                                                Health  Risk Assessment    Smoking Status:  Social History     Tobacco Use   Smoking Status Never    Passive exposure: Past   Smokeless Tobacco Never     Alcohol Consumption:  Social History     Substance and Sexual Activity   Alcohol Use Never       Fall Risk Screen  STEADI Fall Risk Assessment was completed, and patient is at LOW risk for falls.Assessment completed on:2024    Depression Screening   Little interest or pleasure in doing things? Not at all   Feeling down, depressed, or hopeless? Not at all   PHQ-2 Total Score 0      Health Habits and Functional and Cognitive Screenin/19/2024    11:00 AM   Functional & Cognitive Status   Do you have difficulty preparing food and eating? No   Do you have difficulty bathing yourself, getting dressed or grooming yourself? No   Do you have difficulty using the toilet? No   Do you have difficulty moving around from place to place? No   Do you have trouble with steps or getting out of a bed or a chair? Yes   Current Diet Well Balanced Diet   Dental Exam Up to date   Eye Exam Up to date   Exercise (times per week) 0 times per week   Current Exercises Include No Regular Exercise   Do you need help using the phone?  No   Are you deaf or do you have serious difficulty hearing?  No   Do you need help to go to places out of walking distance? No   Do you need help shopping? No   Do you need help preparing meals?  No   Do you need help with housework?  No   Do you need help with laundry? No   Do you need help taking your " medications? No   Do you need help managing money? No   Do you ever drive or ride in a car without wearing a seat belt? No   Have you felt unusual stress, anger or loneliness in the last month? Yes   Who do you live with? Alone   If you need help, do you have trouble finding someone available to you? No   Have you been bothered in the last four weeks by sexual problems? No   Do you have difficulty concentrating, remembering or making decisions? No           Age-appropriate Screening Schedule:  Refer to the list below for future screening recommendations based on patient's age, sex and/or medical conditions. Orders for these recommended tests are listed in the plan section. The patient has been provided with a written plan.    Health Maintenance List  Health Maintenance   Topic Date Due    DXA SCAN  Never done    DIABETIC FOOT EXAM  Never done    COVID-19 Vaccine (4 - 2024-25 season) 09/01/2024    RSV Vaccine - Adults (1 - 1-dose 75+ series) 06/19/2025 (Originally 2/4/2018)    BMI FOLLOWUP  02/27/2025    DIABETIC EYE EXAM  05/13/2025    HEMOGLOBIN A1C  06/19/2025    ANNUAL WELLNESS VISIT  12/19/2025    LIPID PANEL  12/19/2025    TDAP/TD VACCINES (2 - Td or Tdap) 03/19/2029    INFLUENZA VACCINE  Completed    Pneumococcal Vaccine 65+  Completed    ZOSTER VACCINE  Completed    URINE MICROALBUMIN  Discontinued                                                                                                                                                CMS Preventative Services Quick Reference  Risk Factors Identified During Encounter  Immunizations Discussed/Encouraged: COVID19    The above risks/problems have been discussed with the patient.  Pertinent information has been shared with the patient in the After Visit Summary.  An After Visit Summary and PPPS were made available to the patient.    Follow Up:   Next Medicare Wellness visit to be scheduled in 1 year.          Additional E&M Note during same encounter  "follows:  Patient has multiple medical problems which are significant and separately identifiable that require additional work above and beyond the Medicare Wellness Visit.      Chief Complaint  Medicare Wellness-subsequent    Ely Coats is a 79 y.o. female who [facility]   Patient reports ongoing chronic radicular pain where she had shingles outbreak. Patient reports of gabapentin does help control pain. Patient would like to continue. She is able to be more functional with use of gabapentin.   Afib- patient without bleeding issues on eliquis.   Impaired glucose tolerance. - doing well on metformin. Patient without hypoglycemic events. Patient has gained some weight.       Objective   Vital Signs:   Vitals:    12/19/24 1138   BP: 97/58   BP Location: Left arm   Patient Position: Sitting   Pulse: 55   Temp: 96.5 °F (35.8 °C)   TempSrc: Temporal   SpO2: 97%   Weight: 72.1 kg (159 lb)   Height: 160 cm (63\")       Wt Readings from Last 3 Encounters:   12/19/24 72.1 kg (159 lb)   10/21/24 68.9 kg (152 lb)   10/15/24 67.3 kg (148 lb 5.9 oz)     BP Readings from Last 3 Encounters:   12/19/24 97/58   10/21/24 145/79   10/15/24 158/84       Physical Exam  Appearance: No acute distress, well-nourished  Head: normocephalic, atraumatic  Eyes: extraocular movements intact, no scleral icterus, no conjunctival injection  Ears, Nose, and Throat: external ears normal, nares patent, moist mucous membranes  Cardiovascular: regular rate and rhythm. no murmurs, rubs, or gallops. no edema  Respiratory: breathing comfortably, symmetric chest rise, clear to auscultation bilaterally. No wheezes, rales, or rhonchi.  Neuro: alert and oriented to time, place, and person. Normal gait  Psych: normal mood and affect     The following data was reviewed by Rachid Barnes Jr, MD on 12/19/2024  Common Labs   Common labs          9/17/2024    09:01 10/15/2024    08:35 " 12/19/2024    12:51   Common Labs   Glucose  93  111    BUN  11  22    Creatinine  0.68  0.90    Sodium  141  140    Potassium  4.2  4.4    Chloride  105  104    Calcium  9.1  9.0    Albumin  4.1  3.9    Total Bilirubin  0.2  0.2    Alkaline Phosphatase  52  61    AST (SGOT)  12  19    ALT (SGPT)  8  12    WBC  4.97  5.08    Hemoglobin  13.1  11.3    Hematocrit  39.9  34.6    Platelets  259  218    Total Cholesterol   224    Triglycerides   153    HDL Cholesterol   54    LDL Cholesterol    143    Hemoglobin A1C   6.00    Microalbumin, Urine 3.5          Last Urine Toxicity          Latest Ref Rng & Units 9/17/2024   LAST URINE TOXICITY RESULTS   Creatinine, Urine mg/dL 169.4    Amphetamine, Urine Qual Negative Negative    Barbiturates Screen, Urine Negative Negative    Benzodiazepine Screen, Urine Negative Negative    Buprenorphine, Screen, Urine Negative Negative    Cocaine Screen, Urine Negative Negative    Methadone Screen , Urine Negative Negative    Methamphetamine, Ur Negative Negative       Details                     Assessment & Plan   Diagnoses and all orders for this visit:    1. Annual physical exam (Primary)  -     CBC & Differential    2. Paroxysmal atrial fibrillation  Comments:  rate controlled. cont eliquis at current dose.    3. Mixed hyperlipidemia  -     Comprehensive Metabolic Panel  -     Lipid Panel    4. Impaired glucose tolerance  Comments:  monitoring weight. cont metofmrin. check labs.  Orders:  -     Hemoglobin A1c    5. Herpes zoster without complication  Comments:  will Rx gabapentin to help with pain control. UDS and [place] reviewed.  Orders:  -     gabapentin (NEURONTIN) 300 MG capsule; Take 1 capsule by mouth At Night As Needed (pain).  Dispense: 30 capsule; Refill: 0    6. Need for RSV vaccination           FOLLOW UP  Return in about 6 months (around 6/19/2025).  Patient was given instructions and counseling regarding her condition or for health maintenance advice. Please see  specific information pulled into the AVS if appropriate.     Rachid Barnes Jr, MD  12/22/24  17:38 EST

## 2024-12-22 RX ORDER — GABAPENTIN 300 MG/1
300 CAPSULE ORAL NIGHTLY PRN
Qty: 30 CAPSULE | Refills: 0 | Status: SHIPPED | OUTPATIENT
Start: 2024-12-22

## 2025-01-13 ENCOUNTER — HOSPITAL ENCOUNTER (OUTPATIENT)
Dept: MAMMOGRAPHY | Facility: HOSPITAL | Age: 80
Discharge: HOME | End: 2025-01-13
Payer: MEDICARE

## 2025-01-13 ENCOUNTER — HOSPITAL ENCOUNTER (OUTPATIENT)
Dept: BONE DENSITY | Facility: HOSPITAL | Age: 80
Discharge: HOME | End: 2025-01-13
Payer: MEDICARE

## 2025-01-13 DIAGNOSIS — Z12.31 BREAST CANCER SCREENING BY MAMMOGRAM: ICD-10-CM

## 2025-01-13 DIAGNOSIS — Z78.0 POSTMENOPAUSAL: ICD-10-CM

## 2025-01-13 PROBLEM — M81.0 AGE-RELATED OSTEOPOROSIS WITHOUT CURRENT PATHOLOGICAL FRACTURE: Status: ACTIVE | Noted: 2025-01-13

## 2025-01-13 PROCEDURE — 77063 BREAST TOMOSYNTHESIS BI: CPT

## 2025-01-13 PROCEDURE — 77067 SCR MAMMO BI INCL CAD: CPT

## 2025-01-13 PROCEDURE — 77080 DXA BONE DENSITY AXIAL: CPT

## 2025-01-14 ENCOUNTER — TELEPHONE (OUTPATIENT)
Dept: INTERNAL MEDICINE | Facility: CLINIC | Age: 80
End: 2025-01-14
Payer: MEDICARE

## 2025-01-14 NOTE — TELEPHONE ENCOUNTER
Called patient no answer left      OKAY FOR HUB TO READ/ADVISE     Osteoporosis noted. Would recommend treatment. Please ask if patient is ok with taking fosamax. It is generic and been around a long time. Most common side effects are stomach related. If she has intentions for significant dental work in the near future such as dental implants, this would not be an ideal medication.

## 2025-01-14 NOTE — TELEPHONE ENCOUNTER
----- Message from Rachid Barnes sent at 1/13/2025  5:12 PM EST -----  Osteoporosis noted. Would recommend treatment. Please ask if patient is ok with taking fosamax. It is generic and been around a long time. Most common side effects are stomach related. If she has intentions for significant dental work in the near future such as dental implants, this would not be an ideal medication.

## 2025-01-14 NOTE — TELEPHONE ENCOUNTER
Name: Ely Dee      Relationship: Self      Best Callback Number: [phone]       HUB PROVIDED THE RELAY MESSAGE FROM THE OFFICE      PATIENT: VOICED UNDERSTANDING AND HAS NO FURTHER QUESTIONS AT THIS TIME    ADDITIONAL INFORMATION: PATIENT HAS DECLINED TO TAKE MEDICATION FOSAMAX AT THIS TIME. PATIENT WILL RESEARCH MEDICATION AND CONTACT OFFICE IF SHE CHANGES HER MIND.

## 2025-02-10 ENCOUNTER — TELEPHONE (OUTPATIENT)
Dept: ORTHOPEDIC SURGERY | Facility: CLINIC | Age: 80
End: 2025-02-10
Payer: MEDICARE

## 2025-02-18 RX ORDER — FERROUS SULFATE 325(65) MG
1 TABLET ORAL
Qty: 100 TABLET | Refills: 0 | Status: SHIPPED | OUTPATIENT
Start: 2025-02-18

## 2025-02-21 DIAGNOSIS — B02.9 HERPES ZOSTER WITHOUT COMPLICATION: ICD-10-CM

## 2025-02-21 RX ORDER — GABAPENTIN 300 MG/1
300 CAPSULE ORAL
Qty: 30 CAPSULE | Refills: 0 | Status: SHIPPED | OUTPATIENT
Start: 2025-02-21

## 2025-02-21 NOTE — TELEPHONE ENCOUNTER
Refill Request for Controlled Substance    Date of Request: 2/21/2025  Medication Requested: Gabapentin   Last UDS: 09/17/2024  Last Consent: None on file  Last OV: 12/19/2024  Next OV: 06/30/2025

## 2025-03-14 RX ORDER — APIXABAN 5 MG/1
5 TABLET, FILM COATED ORAL 2 TIMES DAILY
Qty: 180 TABLET | Refills: 3 | Status: SHIPPED | OUTPATIENT
Start: 2025-03-14

## 2025-07-16 ENCOUNTER — OFFICE VISIT (OUTPATIENT)
Dept: CARDIOLOGY | Facility: CLINIC | Age: 80
End: 2025-07-16
Payer: MEDICARE

## 2025-07-16 VITALS
HEIGHT: 63 IN | DIASTOLIC BLOOD PRESSURE: 81 MMHG | BODY MASS INDEX: 30.48 KG/M2 | HEART RATE: 64 BPM | WEIGHT: 172 LBS | SYSTOLIC BLOOD PRESSURE: 133 MMHG

## 2025-07-16 DIAGNOSIS — I48.0 PAROXYSMAL ATRIAL FIBRILLATION: Primary | ICD-10-CM

## 2025-07-16 DIAGNOSIS — I10 HYPERTENSION, ESSENTIAL: ICD-10-CM

## 2025-07-16 DIAGNOSIS — E78.2 HYPERLIPEMIA, MIXED: ICD-10-CM

## 2025-07-16 DIAGNOSIS — Z78.9 STATIN INTOLERANCE: ICD-10-CM

## 2025-07-16 PROCEDURE — 1160F RVW MEDS BY RX/DR IN RCRD: CPT | Performed by: INTERNAL MEDICINE

## 2025-07-16 PROCEDURE — 1159F MED LIST DOCD IN RCRD: CPT | Performed by: INTERNAL MEDICINE

## 2025-07-16 PROCEDURE — 3075F SYST BP GE 130 - 139MM HG: CPT | Performed by: INTERNAL MEDICINE

## 2025-07-16 PROCEDURE — 99214 OFFICE O/P EST MOD 30 MIN: CPT | Performed by: INTERNAL MEDICINE

## 2025-07-16 PROCEDURE — 3079F DIAST BP 80-89 MM HG: CPT | Performed by: INTERNAL MEDICINE

## 2025-07-16 NOTE — PROGRESS NOTES
Chief Complaint  1 yr follow  up , Atrial Fibrillation, and Hypertension    Subjective            Ely Coats presents to [facility] CARDIOLOGY    Ms. Crocker is here for follow-up evaluation management of paroxysmal atrial fibrillation, essential hypertension.  She has mixed hyperlipidemia and is statin intolerant.  She denies chest pain, palpitations or excessive shortness of breath.  She has an occasional irregular heart rate but very rare about every 9 months or so.    PMH  Past Medical History:   Diagnosis Date    Atrial fibrillation with RVR     FOLLOWS W/LINK, NO RECENT ISSUES, NO CP    Calculus of gallbladder with acute on chronic cholecystitis with obstruction 05/09/2022    Cancer     SKIN CANCER REMOVED    Colon polyp     Contusion of face     Diabetes mellitus     BS IN  THE A.M. 120 ON AVERAGE    Essential hypertension     CONT W/MEDS    Fall     Gallstones     Hyperlipidemia     Kidney stone     Osteoarthritis     Osteopenia     Renal insufficiency     due to nsaids (8/2017 states labs wnl) NO CURRENT PROBLEMS    Snoring     NO CPAP    Wound infection after surgery 05/09/2022         SURGICALHX  Past Surgical History:   Procedure Laterality Date    BLEPHAROPLASTY Bilateral     CARDIAC CATHETERIZATION Left 2011    [facility], Dr. Cooper Ramires, NO INTERVENTION    CHOLECYSTECTOMY N/A 5/4/2022    Procedure: CHOLECYSTECTOMY LAPAROSCOPIC;  Surgeon: Josue Jernigan MD;  Location: [facility] MAIN OR;  Service: General;  Laterality: N/A;    COLONOSCOPY  2014    negative    COLONOSCOPY  2011    polyp-repeat 2014    COLONOSCOPY N/A 7/17/2024    Procedure: COLONOSCOPY;  Surgeon: Simba Nuno MD;  Location: [facility] ENDOSCOPY;  Service: Gastroenterology;  Laterality: N/A;  DIVERTICULOSIS    CYSTOSCOPY  09/2016    negative- Dr. Murphy per patient urethral polyp noted    ERCP N/A 04/20/2022    Procedure: ENDOSCOPIC RETROGRADE CHOLANGIOPANCREATOGRAPHY WITH  SPHINCTEROTOMY, BALLOON SWEEP, COMMON BILE DUCT STENT PLACED, PANCREATIC DUCT STENT PLACED;  Surgeon: Gurvinder Perry MD;  Location: [facility] ENDOSCOPY;  Service: Gastroenterology;  Laterality: N/A;  BILE DUCT STONES    ERCP N/A 5/10/2022    Procedure: ENDOSCOPIC RETROGRADE CHOLANGIOPANCREATOGRAPHY WITH STENT REMOVAL X2, SPHINCTEROTOMY, 12-15MM BALLOON SWEEP WITH BILE DUCT STONE REMOVAL;  Surgeon: Gurvinder Perry MD;  Location: [facility] ENDOSCOPY;  Service: Gastroenterology;  Laterality: N/A;  BILE DUCT STONES    SKIN CANCER EXCISION  04/2016    basal cell on back    TOTAL KNEE ARTHROPLASTY Right 1/17/2024    Procedure: RIGHT TOTAL KNEE ARTHROPLASTY WITH CLARISSA ROBOT;  Surgeon: Geraldo Gann MD;  Location: [facility] MAIN OR;  Service: Robotics - Ortho;  Laterality: Right;    TUBAL ABDOMINAL LIGATION      WRIST FRACTURE SURGERY Left     ORIF        SOC  Social History     Socioeconomic History    Marital status:    Tobacco Use    Smoking status: Never     Passive exposure: Past    Smokeless tobacco: Never   Vaping Use    Vaping status: Never Used   Substance and Sexual Activity    Alcohol use: Never    Drug use: Never    Sexual activity: Defer         FAMHX  Family History   Problem Relation Age of Onset    Heart disease Mother         afib    Lung disease Mother         interstitial lung disease    Stroke Father         hemmorhagic stroke-aneurysm    Diabetes Brother     Malig Hyperthermia Neg Hx     Colon cancer Neg Hx           ALLERGY  Allergies   Allergen Reactions    Penicillins Rash     Mild rash- pt states can take amoxicillin or keflex      Statins Other (See Comments)     Hair loss crestor        MEDSCURRENT    Current Outpatient Medications:     Accu-Chek Guide test strip, USE 1 STRIP TWICE DAILY, Disp: , Rfl:     albuterol sulfate  (90 Base) MCG/ACT inhaler, Inhale 2 puffs Every 4 (Four) Hours As Needed for Wheezing or Shortness of Air., Disp: 18 g, Rfl: 3    apixaban (Eliquis) 5 MG  "tablet tablet, TAKE 1 TABLET BY MOUTH TWICE DAILY, Disp: 180 tablet, Rfl: 3    carvedilol (COREG) 12.5 MG tablet, Take 1 tablet by mouth 2 (Two) Times a Day With Meals., Disp: 180 tablet, Rfl: 3    cyanocobalamin (VITAMIN B-12) 1000 MCG tablet, Take 0.5 tablets by mouth Daily., Disp: , Rfl:     dilTIAZem XR (Dilt-XR) 240 MG 24 hr capsule, TAKE 1 CAPSULE BY MOUTH EVERY DAY, Disp: 90 capsule, Rfl: 3    FeroSul 325 (65 Fe) MG tablet, TAKE 1 TABLET BY MOUTH EVERY MORNING WITH BREAKFAST, Disp: 100 tablet, Rfl: 0    furosemide (LASIX) 20 MG tablet, Take 1 tablet by mouth Daily As Needed. Doesn't take every day, Disp: , Rfl:     gabapentin (NEURONTIN) 300 MG capsule, TAKE 1 CAPSULE BY MOUTH EVERY NIGHT AT BEDTIME, Disp: 30 capsule, Rfl: 0    metFORMIN (GLUCOPHAGE) 500 MG tablet, TAKE 1 TABLET BY MOUTH EVERY DAY WITH BREAKFAST, Disp: 90 tablet, Rfl: 1    Multiple Vitamin (MULTIVITAMIN) tablet, Take 1 tablet by mouth Daily., Disp: , Rfl:     polyethylene glycol (MIRALAX) 17 GM/SCOOP powder, Take 17 g by mouth 2 (Two) Times a Day As Needed (constipation)., Disp: 238 g, Rfl: 0      Review of Systems   Cardiovascular:  Positive for irregular heartbeat. Negative for chest pain.   Respiratory:  Negative for shortness of breath.    Hematologic/Lymphatic: Does not bruise/bleed easily.        Objective     /81   Pulse 64   Ht 160 cm (63\")   Wt 78 kg (172 lb)   BMI 30.47 kg/m²       General Appearance:   well developed  well nourished  HENT:   oropharynx moist  lips not cyanotic  Neck:  thyroid not enlarged  supple  Respiratory:  no respiratory distress  normal breath sounds  no rales  Cardiovascular:  no jugular venous distention  regular rhythm  apical impulse normal  S1 normal, S2 normal  no S3, no S4   no murmur  no rub, no thrill  carotid pulses normal; no bruit  pedal pulses normal  lower extremity edema: none    Musculoskeletal:  no clubbing of fingers.   normocephalic, head atraumatic  Skin:   warm, " dry  Psychiatric:  judgement and insight appropriate  normal mood and affect      Result Review :     The following data was reviewed by: Sidney Suazo MD on 10/21/2024:    CMP          9/14/2024    03:43 10/15/2024    08:35 12/19/2024    12:51   CMP   Glucose 118  93  111    BUN 29  11  22    Creatinine 0.72  0.68  0.90    EGFR 84.1  87.6  64.4    Sodium 138  141  140    Potassium 4.2  4.2  4.4    Chloride 107  105  104    Calcium 8.6  9.1  9.0    Total Protein  6.5  5.9    Albumin  4.1  3.9    Globulin  2.4  2.0    Total Bilirubin  0.2  0.2    Alkaline Phosphatase  52  61    AST (SGOT)  12  19    ALT (SGPT)  8  12    Albumin/Globulin Ratio  1.7  2.0    BUN/Creatinine Ratio 40.3  16.2  24.4    Anion Gap 8.8  7.4  9.0      CBC          9/14/2024    03:43 10/15/2024    08:35 12/19/2024    12:51   CBC   WBC 9.80  4.97  5.08    RBC 4.02  3.96  3.40    Hemoglobin 13.3  13.1  11.3    Hematocrit 40.1  39.9  34.6    MCV 99.8  100.8  101.8    MCH 33.1  33.1  33.2    MCHC 33.2  32.8  32.7    RDW 14.6  14.1  12.2    Platelets 263  259  218      Lipid Panel          12/19/2024    12:51   Lipid Panel   Total Cholesterol 224    Triglycerides 153    HDL Cholesterol 54    VLDL Cholesterol 27    LDL Cholesterol  143    LDL/HDL Ratio 2.58      TSH          9/13/2024    11:59 9/13/2024    18:20   TSH   TSH 2.060  0.923        Data reviewed : Primary care records reviewed, laboratory studies reviewed     Procedures             Assessment and Plan        ASSESSMENT:  Encounter Diagnoses   Name Primary?    Paroxysmal atrial fibrillation Yes    Hypertension, essential     Hyperlipemia, mixed     Statin intolerance          PLAN:    1.  Paroxysmal atrial fibrillation-the patient is maintaining sinus rhythm currently.  Continue current medical therapy  2.  Essential hypertension-controlled, off losartan currently.  Continue current medical therapy  3.  Chronic anticoagulant use-stable, continue Eliquis  4.  Mixed  hyperlipidemia-the patient is intolerant to statin therapy, she declines additional medication at this time    Follow-up annually          Patient was given instructions and counseling regarding her condition or for health maintenance advice. Please see specific information pulled into the AVS if appropriate.             ERIKA Suazo MD  7/16/2025    08:26 EDT

## 2025-07-18 RX ORDER — FERROUS SULFATE 325(65) MG
1 TABLET ORAL
Qty: 100 TABLET | Refills: 0 | Status: SHIPPED | OUTPATIENT
Start: 2025-07-18

## 2025-07-21 ENCOUNTER — TELEPHONE (OUTPATIENT)
Dept: CARDIOLOGY | Facility: CLINIC | Age: 80
End: 2025-07-21
Payer: MEDICARE

## 2025-07-21 NOTE — TELEPHONE ENCOUNTER
PT DROPPED OFF PW FOR ELIQUIS PT ASST.  SCANNED IN MEDIA AND FAXED TO [facility].  AWAITING DETERMINATION.

## 2025-08-05 ENCOUNTER — OFFICE VISIT (OUTPATIENT)
Dept: INTERNAL MEDICINE | Facility: CLINIC | Age: 80
End: 2025-08-05
Payer: MEDICARE

## 2025-08-05 VITALS
OXYGEN SATURATION: 95 % | BODY MASS INDEX: 29.55 KG/M2 | TEMPERATURE: 97.1 F | DIASTOLIC BLOOD PRESSURE: 66 MMHG | HEIGHT: 63 IN | HEART RATE: 52 BPM | SYSTOLIC BLOOD PRESSURE: 121 MMHG | WEIGHT: 166.8 LBS

## 2025-08-05 DIAGNOSIS — I10 PRIMARY HYPERTENSION: Primary | ICD-10-CM

## 2025-08-05 DIAGNOSIS — N30.00 ACUTE CYSTITIS WITHOUT HEMATURIA: ICD-10-CM

## 2025-08-05 DIAGNOSIS — E78.2 MIXED HYPERLIPIDEMIA: ICD-10-CM

## 2025-08-05 DIAGNOSIS — R30.0 DYSURIA: ICD-10-CM

## 2025-08-05 DIAGNOSIS — R73.02 IMPAIRED GLUCOSE TOLERANCE: ICD-10-CM

## 2025-08-05 DIAGNOSIS — I48.0 PAROXYSMAL ATRIAL FIBRILLATION: Chronic | ICD-10-CM

## 2025-08-05 DIAGNOSIS — M81.0 AGE-RELATED OSTEOPOROSIS WITHOUT CURRENT PATHOLOGICAL FRACTURE: ICD-10-CM

## 2025-08-05 DIAGNOSIS — B02.29 POST HERPETIC NEURALGIA: ICD-10-CM

## 2025-08-05 DIAGNOSIS — Z79.899 ENCOUNTER FOR LONG-TERM (CURRENT) USE OF MEDICATIONS: ICD-10-CM

## 2025-08-05 LAB
ALBUMIN SERPL-MCNC: 4.1 G/DL (ref 3.5–5.2)
ALBUMIN UR-MCNC: <1.2 MG/DL
ALBUMIN/GLOB SERPL: 1.7 G/DL
ALP SERPL-CCNC: 71 U/L (ref 39–117)
ALT SERPL W P-5'-P-CCNC: 11 U/L (ref 1–33)
AMPHET+METHAMPHET UR QL: NEGATIVE
AMPHETAMINE INTERNAL CONTROL: NORMAL
AMPHETAMINES UR QL: NEGATIVE
ANION GAP SERPL CALCULATED.3IONS-SCNC: 9.3 MMOL/L (ref 5–15)
AST SERPL-CCNC: 16 U/L (ref 1–32)
BARBITURATE INTERNAL CONTROL: NORMAL
BARBITURATES UR QL SCN: NEGATIVE
BASOPHILS # BLD AUTO: 0.03 10*3/MM3 (ref 0–0.2)
BASOPHILS NFR BLD AUTO: 0.6 % (ref 0–1.5)
BENZODIAZ UR QL SCN: NEGATIVE
BENZODIAZEPINE INTERNAL CONTROL: NORMAL
BILIRUB BLD-MCNC: NEGATIVE MG/DL
BILIRUB SERPL-MCNC: 0.3 MG/DL (ref 0–1.2)
BUN SERPL-MCNC: 17 MG/DL (ref 8–23)
BUN/CREAT SERPL: 27 (ref 7–25)
BUPRENORPHINE INTERNAL CONTROL: NORMAL
BUPRENORPHINE SERPL-MCNC: NEGATIVE NG/ML
CALCIUM SPEC-SCNC: 9 MG/DL (ref 8.6–10.5)
CANNABINOIDS SERPL QL: NEGATIVE
CHLORIDE SERPL-SCNC: 105 MMOL/L (ref 98–107)
CHOLEST SERPL-MCNC: 221 MG/DL (ref 0–200)
CLARITY, POC: ABNORMAL
CO2 SERPL-SCNC: 24.7 MMOL/L (ref 22–29)
COCAINE INTERNAL CONTROL: NORMAL
COCAINE UR QL: NEGATIVE
COLOR UR: YELLOW
CREAT SERPL-MCNC: 0.63 MG/DL (ref 0.57–1)
CREAT UR-MCNC: 130.5 MG/DL
DEPRECATED RDW RBC AUTO: 46.7 FL (ref 37–54)
EGFRCR SERPLBLD CKD-EPI 2021: 88.7 ML/MIN/1.73
EOSINOPHIL # BLD AUTO: 0.08 10*3/MM3 (ref 0–0.4)
EOSINOPHIL NFR BLD AUTO: 1.6 % (ref 0.3–6.2)
ERYTHROCYTE [DISTWIDTH] IN BLOOD BY AUTOMATED COUNT: 12.5 % (ref 12.3–15.4)
EXPIRATION DATE: 0
EXPIRATION DATE: 0
GLOBULIN UR ELPH-MCNC: 2.4 GM/DL
GLUCOSE SERPL-MCNC: 76 MG/DL (ref 65–99)
GLUCOSE UR STRIP-MCNC: NEGATIVE MG/DL
HBA1C MFR BLD: 5.5 % (ref 4.8–5.6)
HCT VFR BLD AUTO: 36.7 % (ref 34–46.6)
HDLC SERPL-MCNC: 59 MG/DL (ref 40–60)
HGB BLD-MCNC: 12.3 G/DL (ref 12–15.9)
IMM GRANULOCYTES # BLD AUTO: 0.01 10*3/MM3 (ref 0–0.05)
IMM GRANULOCYTES NFR BLD AUTO: 0.2 % (ref 0–0.5)
KETONES UR QL: NEGATIVE
LDLC SERPL CALC-MCNC: 138 MG/DL (ref 0–100)
LDLC/HDLC SERPL: 2.29 {RATIO}
LEUKOCYTE EST, POC: ABNORMAL
LYMPHOCYTES # BLD AUTO: 1.75 10*3/MM3 (ref 0.7–3.1)
LYMPHOCYTES NFR BLD AUTO: 35.4 % (ref 19.6–45.3)
Lab: 0
Lab: 0
MCH RBC QN AUTO: 34.1 PG (ref 26.6–33)
MCHC RBC AUTO-ENTMCNC: 33.5 G/DL (ref 31.5–35.7)
MCV RBC AUTO: 101.7 FL (ref 79–97)
MDMA (ECSTASY) INTERNAL CONTROL: NORMAL
MDMA UR QL SCN: NEGATIVE
METHADONE INTERNAL CONTROL: NORMAL
METHADONE UR QL SCN: NEGATIVE
METHAMPHETAMINE INTERNAL CONTROL: NORMAL
MICROALBUMIN/CREAT UR: NORMAL MG/G{CREAT}
MONOCYTES # BLD AUTO: 0.51 10*3/MM3 (ref 0.1–0.9)
MONOCYTES NFR BLD AUTO: 10.3 % (ref 5–12)
MORPHINE INTERNAL CONTROL: NORMAL
MORPHINE/OPIATES SCREEN, URINE: NEGATIVE
NEUTROPHILS NFR BLD AUTO: 2.57 10*3/MM3 (ref 1.7–7)
NEUTROPHILS NFR BLD AUTO: 51.9 % (ref 42.7–76)
NITRITE UR-MCNC: NEGATIVE MG/ML
NRBC BLD AUTO-RTO: 0 /100 WBC (ref 0–0.2)
OXYCODONE INTERNAL CONTROL: NORMAL
OXYCODONE UR QL SCN: NEGATIVE
PCP UR QL SCN: NEGATIVE
PH UR: 6.5 [PH] (ref 5–8)
PHENCYCLIDINE INTERNAL CONTROL: NORMAL
PLATELET # BLD AUTO: 231 10*3/MM3 (ref 140–450)
PMV BLD AUTO: 10.4 FL (ref 6–12)
POTASSIUM SERPL-SCNC: 4.3 MMOL/L (ref 3.5–5.2)
PROT SERPL-MCNC: 6.5 G/DL (ref 6–8.5)
PROT UR STRIP-MCNC: NEGATIVE MG/DL
RBC # BLD AUTO: 3.61 10*6/MM3 (ref 3.77–5.28)
RBC # UR STRIP: ABNORMAL /UL
SODIUM SERPL-SCNC: 139 MMOL/L (ref 136–145)
SP GR UR: 1.01 (ref 1–1.03)
THC INTERNAL CONTROL: NORMAL
TRIGL SERPL-MCNC: 134 MG/DL (ref 0–150)
UROBILINOGEN UR QL: NORMAL
VLDLC SERPL-MCNC: 24 MG/DL (ref 5–40)
WBC NRBC COR # BLD AUTO: 4.95 10*3/MM3 (ref 3.4–10.8)

## 2025-08-05 PROCEDURE — 82570 ASSAY OF URINE CREATININE: CPT | Performed by: INTERNAL MEDICINE

## 2025-08-05 PROCEDURE — 3074F SYST BP LT 130 MM HG: CPT | Performed by: INTERNAL MEDICINE

## 2025-08-05 PROCEDURE — 80061 LIPID PANEL: CPT | Performed by: INTERNAL MEDICINE

## 2025-08-05 PROCEDURE — 81003 URINALYSIS AUTO W/O SCOPE: CPT | Performed by: INTERNAL MEDICINE

## 2025-08-05 PROCEDURE — 99214 OFFICE O/P EST MOD 30 MIN: CPT | Performed by: INTERNAL MEDICINE

## 2025-08-05 PROCEDURE — 85025 COMPLETE CBC W/AUTO DIFF WBC: CPT | Performed by: INTERNAL MEDICINE

## 2025-08-05 PROCEDURE — 82043 UR ALBUMIN QUANTITATIVE: CPT | Performed by: INTERNAL MEDICINE

## 2025-08-05 PROCEDURE — 1126F AMNT PAIN NOTED NONE PRSNT: CPT | Performed by: INTERNAL MEDICINE

## 2025-08-05 PROCEDURE — 80053 COMPREHEN METABOLIC PANEL: CPT | Performed by: INTERNAL MEDICINE

## 2025-08-05 PROCEDURE — 83036 HEMOGLOBIN GLYCOSYLATED A1C: CPT | Performed by: INTERNAL MEDICINE

## 2025-08-05 PROCEDURE — 3078F DIAST BP <80 MM HG: CPT | Performed by: INTERNAL MEDICINE

## 2025-08-07 ENCOUNTER — LAB (OUTPATIENT)
Dept: LAB | Facility: HOSPITAL | Age: 80
End: 2025-08-07
Payer: MEDICARE

## 2025-08-07 ENCOUNTER — RESULTS FOLLOW-UP (OUTPATIENT)
Dept: INTERNAL MEDICINE | Facility: CLINIC | Age: 80
End: 2025-08-07
Payer: MEDICARE

## 2025-08-07 DIAGNOSIS — R30.0 DYSURIA: Primary | ICD-10-CM

## 2025-08-07 LAB
BACTERIA UR QL AUTO: ABNORMAL /HPF
BILIRUB UR QL STRIP: NEGATIVE
CLARITY UR: CLEAR
COLOR UR: YELLOW
GLUCOSE UR STRIP-MCNC: NEGATIVE MG/DL
HGB UR QL STRIP.AUTO: NEGATIVE
HYALINE CASTS UR QL AUTO: ABNORMAL /LPF
KETONES UR QL STRIP: ABNORMAL
LEUKOCYTE ESTERASE UR QL STRIP.AUTO: ABNORMAL
NITRITE UR QL STRIP: NEGATIVE
PH UR STRIP.AUTO: 5.5 [PH] (ref 5–8)
PROT UR QL STRIP: NEGATIVE
RBC # UR STRIP: ABNORMAL /HPF
REF LAB TEST METHOD: ABNORMAL
SP GR UR STRIP: 1.03 (ref 1–1.03)
SQUAMOUS #/AREA URNS HPF: ABNORMAL /HPF
UROBILINOGEN UR QL STRIP: ABNORMAL
WBC # UR STRIP: ABNORMAL /HPF

## 2025-08-07 PROCEDURE — 87077 CULTURE AEROBIC IDENTIFY: CPT | Performed by: INTERNAL MEDICINE

## 2025-08-07 PROCEDURE — 81001 URINALYSIS AUTO W/SCOPE: CPT | Performed by: INTERNAL MEDICINE

## 2025-08-07 PROCEDURE — 87186 SC STD MICRODIL/AGAR DIL: CPT | Performed by: INTERNAL MEDICINE

## 2025-08-07 PROCEDURE — 87086 URINE CULTURE/COLONY COUNT: CPT | Performed by: INTERNAL MEDICINE

## 2025-08-08 RX ORDER — SULFAMETHOXAZOLE AND TRIMETHOPRIM 800; 160 MG/1; MG/1
1 TABLET ORAL 2 TIMES DAILY
Qty: 10 TABLET | Refills: 0 | Status: SHIPPED | OUTPATIENT
Start: 2025-08-08 | End: 2025-08-13

## 2025-08-09 LAB — BACTERIA SPEC AEROBE CULT: ABNORMAL

## (undated) DEVICE — SPHINCTEROTOME: Brand: DREAMTOME™ RX 44

## (undated) DEVICE — SUT MERSILENE POLYSTR CT1 BR 0 75CM GRN

## (undated) DEVICE — INTENDED FOR TISSUE SEPARATION, AND OTHER PROCEDURES THAT REQUIRE A SHARP SURGICAL BLADE TO PUNCTURE OR CUT.: Brand: BARD-PARKER ® CARBON RIB-BACK BLADES

## (undated) DEVICE — STERILE POLYISOPRENE POWDER-FREE SURGICAL GLOVES WITH EMOLLIENT COATING: Brand: PROTEXIS

## (undated) DEVICE — 3M™ STERI-STRIP™ REINFORCED ADHESIVE SKIN CLOSURES, R1547, 1/2 IN X 4 IN (12 MM X 100 MM), 6 STRIPS/ENVELOPE: Brand: 3M™ STERI-STRIP™

## (undated) DEVICE — SOL IRR H2O BTL 1000ML STRL

## (undated) DEVICE — TOWEL,OR,DSP,ST,BLUE,STD,4/PK,20PK/CS: Brand: MEDLINE

## (undated) DEVICE — GOWN,REINFRCE,POLY,SIRUS,BREATH SLV,XXLG: Brand: MEDLINE

## (undated) DEVICE — ELECTRD BLD EDGE COAT 3IN

## (undated) DEVICE — SNAR POLYP CAPTIFLX WD OVL 27MM 240CM

## (undated) DEVICE — ENDOPATH PNEUMONEEDLE INSUFFLATION NEEDLES WITH LUER LOCK CONNECTORS 120MM: Brand: ENDOPATH

## (undated) DEVICE — SCRW HEX PERSONA FML 2.5X25MM PK/2
Type: IMPLANTABLE DEVICE | Site: KNEE | Status: NON-FUNCTIONAL
Removed: 2024-01-17

## (undated) DEVICE — Device: Brand: PULSAVAC®

## (undated) DEVICE — APPL CHLORAPREP HI/LITE 26ML ORNG

## (undated) DEVICE — LINER SURG CANSTR SXN S/RIGD 1500CC

## (undated) DEVICE — ANTIBACTERIAL VIOLET BRAIDED (POLYGLACTIN 910), SYNTHETIC ABSORBABLE SURGICAL SUTURE: Brand: COATED VICRYL

## (undated) DEVICE — SYS CLOSE PORTII CARTR/THOMASN XL

## (undated) DEVICE — DRSNG PAD ABD 8X10IN STRL

## (undated) DEVICE — Device: Brand: DEFENDO AIR/WATER/SUCTION AND BIOPSY VALVE

## (undated) DEVICE — PEEL-AWAY TOGA, 2X LARGE: Brand: FLYTE

## (undated) DEVICE — STRYKER PERFORMANCE SERIES SAGITTAL BLADE: Brand: STRYKER PERFORMANCE SERIES

## (undated) DEVICE — 2, DISPOSABLE SUCTION/IRRIGATOR WITHOUT DISPOSABLE TIP: Brand: STRYKEFLOW

## (undated) DEVICE — DRESSING,GAUZE,XEROFORM,CURAD,1"X8",ST: Brand: CURAD

## (undated) DEVICE — GLV SURG SENSICARE PI ORTHO SZ8 LF STRL

## (undated) DEVICE — ENDOPATH XCEL WITH OPTIVIEW TECHNOLOGY BLADELESS TROCARS WITH STABILITY SLEEVES: Brand: ENDOPATH XCEL OPTIVIEW

## (undated) DEVICE — TOTAL KNEE-LF: Brand: MEDLINE INDUSTRIES, INC.

## (undated) DEVICE — RX DELIVERY SYSTEM: Brand: NAVIFLEX™ RX DELIVERY SYSTEM

## (undated) DEVICE — SLV SCD KN/LEN ADJ EXPRSS BLENDED MD 1P/U

## (undated) DEVICE — NON-WOVEN ADHESIVE WOUND DRESSING: Brand: PRIMAPORE ADHESIVE DRESSING 10*8CM

## (undated) DEVICE — PAD GRND REM POLYHESIVE A/ DISP

## (undated) DEVICE — PULLOVER TOGA, 2X LARGE: Brand: FLYTE, SURGICOOL

## (undated) DEVICE — TISSUE RETRIEVAL SYSTEM: Brand: INZII RETRIEVAL SYSTEM

## (undated) DEVICE — CVR LEG BOOTLEG F/R NOSKID 33IN

## (undated) DEVICE — DEV LK WIREGUIDE FUSN OLYMP SCP

## (undated) DEVICE — SOLIDIFIER LIQLOC PLS 1500CC BT

## (undated) DEVICE — DRSNG SURESITE WNDW 4X4.5

## (undated) DEVICE — LAPAROSCOPIC WIRE J-HOOK ELECTRODE COATED: Brand: CLEANCOAT

## (undated) DEVICE — GENERAL LAPAROSCOPY-LF: Brand: MEDLINE INDUSTRIES, INC.

## (undated) DEVICE — NO-SCRATCH ™ SMALL WHITNEY CURETTE ™ IS A SINGLE-USE, PLASTIC CURETTE FOR QUICKLY APPLYING, MANIPULATING AND REMOVING BONE CEMENT DURING HIP AND KNEE REPLACEMENT SURGERY. THE PLASTIC IS SOFTER THAN STEEL INSTRUMENTS, REDUCING THE RISK OF DAMAGING THE PROSTHESIS WITH METAL INSTRUMENTS.  THE CURETTE’S 6MM TIP REMOVES EXCESS CEMENT FROM REPLACEMENT HIPS AND KNEES. EASY-TO-MANEUVER, THE SMALL BLUE CURETTE LETS YOU REMOVE CEMENT FROM ALL EDGES OF THE PROSTHESIS.NO-SCRATCH WHITNEY SMALL CURETTE FEATURES:SAFER THAN STEEL- MADE OF PLASTIC - STURDY YET SOFTER THAN SURGICAL STEEL.HANDIER- EACH TOOL HAS A MOLDED-IN THUMB INDENTATION INSTANTLY ORIENTING THE TOOL.- EASIER TO MANEUVER IN HARD TO SEE PLACES.- COLOR-CODED FOR EASY IDENTIFICATION.FASTER- COMES INDIVIDUALLY PACKAGED IN STERILE, PEEL OPEN POUCH, READY TO GO.- APPLIES, MANIPULATES, OR REMOVES CEMENT WITH FINGERTIP PRECISION.ECONOMICAL- THE COST OF A SINGLE REVISION DWARFS THE COST OF A SINGLE-USE CURETTE. - DISPOSABLE – THERE’S NO NEED TO WASTE TIME REMOVING HARDENED CEMENT OR RE-STERILIZING TOOLS.- LESS EXPENSIVE TO BUY AND INVENTORY - ORDER ONLY THE TOOL YOU USE.- PACKAGED 25 INDIVIDUALLY WRAPPED TOOLS TO A CARTON FOR CONVENIENT SHELF STORAGE.: Brand: WHITNEY NO-SCRATCH CURETTE (SMALL)

## (undated) DEVICE — SUT ETHLN 3/0 FS1 663G

## (undated) DEVICE — UNDERCAST PADDING: Brand: DEROYAL

## (undated) DEVICE — Device

## (undated) DEVICE — SOL IRR NACL 0.9PCT 3000ML

## (undated) DEVICE — PENCL E/S HNDSWCH ROCKR CB

## (undated) DEVICE — DRP SURG U/DRP INVISISHIELD PA 48X52IN

## (undated) DEVICE — MAT FLR ABS W/BLU/LINER 56X72IN WHT

## (undated) DEVICE — STERILE PATIENT PROTECTIVE PAD FOR IMP® KNEE POSITIONERS & COHESIVE WRAP (10 / CASE): Brand: DE MAYO KNEE POSITIONER®

## (undated) DEVICE — NDL HYPO ECLPS SFTY 18G 1 1/2IN

## (undated) DEVICE — LAPAROSCOPIC SCISSORS: Brand: EPIX LAPAROSCOPIC SCISSORS

## (undated) DEVICE — ANTIBACTERIAL UNDYED BRAIDED (POLYGLACTIN 910), SYNTHETIC ABSORBABLE SUTURE: Brand: COATED VICRYL

## (undated) DEVICE — BNDG ELAS MATRX V/CLS 6INX10YD LF

## (undated) DEVICE — NON-WOVEN ADHESIVE WOUND DRESSING: Brand: PRIMAPORE ADHESIVE WOUND DRSG 7.2*5CM

## (undated) DEVICE — BASIC SINGLE BASIN-LF: Brand: MEDLINE INDUSTRIES, INC.

## (undated) DEVICE — DISPOSABLE TOURNIQUET CUFF 30"X4", 1-LINE, WHITE, STERILE, 1EA/PK, 10PK/CS: Brand: ASP MEDICAL

## (undated) DEVICE — CONN JET HYDRA H20 AUXILIARY DISP

## (undated) DEVICE — SPHINCT CANNULATOME2 2L DOME/TP .035IN 6F 2.8MM 200CM

## (undated) DEVICE — SHEET,DRAPE,70X85,STERILE: Brand: MEDLINE

## (undated) DEVICE — STERILE POLYISOPRENE POWDER-FREE SURGICAL GLOVES: Brand: PROTEXIS

## (undated) DEVICE — SOL IRRG H2O PL/BG 1000ML STRL

## (undated) DEVICE — DRP ROBOTIC ROSA BX/20

## (undated) DEVICE — SUT VIC 2/0 CT1 36IN

## (undated) DEVICE — ROTATING HEMOSTATIC VALVE .096": Brand: RHV

## (undated) DEVICE — RETRIEVAL BALLOON CATHETER: Brand: EXTRACTOR™ PRO RX

## (undated) DEVICE — PENCL E/S SMOKEEVAC W/TELESCP CANN

## (undated) DEVICE — ENDOPATH XCEL WITH OPTIVIEW TECHNOLOGY UNIVERSAL TROCAR STABILITY SLEEVES: Brand: ENDOPATH XCEL OPTIVIEW

## (undated) DEVICE — SYR LUERLOK 30CC

## (undated) DEVICE — EGD OR ERCP KIT: Brand: MEDLINE INDUSTRIES, INC.

## (undated) DEVICE — 3 BONE CEMENT MIXER: Brand: MIXEVAC

## (undated) DEVICE — PROXIMATE RH ROTATING HEAD SKIN STAPLERS (35 WIDE) CONTAINS 35 STAINLESS STEEL STAPLES: Brand: PROXIMATE

## (undated) DEVICE — GAUZE,SPONGE,4"X4",16PLY,STRL,LF,10/TRAY: Brand: MEDLINE

## (undated) DEVICE — 30977 SEE SHARP - ENHANCED INTRAOPERATIVE LAPAROSCOPE CLEANING & DEFOGGING: Brand: 30977 SEE SHARP - ENHANCED INTRAOPERATIVE LAPAROSCOPE CLEANING & DEFOGGING

## (undated) DEVICE — GW HIPRFM BIL JAGWIRE REVOLUTION STR .025IN 450CM